# Patient Record
Sex: FEMALE | Race: BLACK OR AFRICAN AMERICAN | NOT HISPANIC OR LATINO
[De-identification: names, ages, dates, MRNs, and addresses within clinical notes are randomized per-mention and may not be internally consistent; named-entity substitution may affect disease eponyms.]

---

## 2016-12-23 RX ORDER — SODIUM CHLORIDE 9 MG/ML
1000 INJECTION, SOLUTION INTRAVENOUS
Qty: 0 | Refills: 0 | Status: DISCONTINUED | OUTPATIENT
Start: 2017-01-13 | End: 2017-01-14

## 2017-01-12 ENCOUNTER — RESULT REVIEW (OUTPATIENT)
Age: 47
End: 2017-01-12

## 2017-01-12 NOTE — ASU PATIENT PROFILE, ADULT - PMH
Abnormal uterine and vaginal bleeding, unspecified    Hypertension    Iron deficiency anemia    Lupus nephritis    Obesity    SLE (systemic lupus erythematosus)

## 2017-01-13 ENCOUNTER — OUTPATIENT (OUTPATIENT)
Dept: OUTPATIENT SERVICES | Facility: HOSPITAL | Age: 47
LOS: 1 days | Discharge: HOME | End: 2017-01-13
Payer: COMMERCIAL

## 2017-01-13 VITALS
HEART RATE: 71 BPM | TEMPERATURE: 99 F | SYSTOLIC BLOOD PRESSURE: 142 MMHG | HEIGHT: 68.5 IN | WEIGHT: 251.11 LBS | OXYGEN SATURATION: 98 % | RESPIRATION RATE: 14 BRPM | DIASTOLIC BLOOD PRESSURE: 95 MMHG

## 2017-01-13 VITALS
DIASTOLIC BLOOD PRESSURE: 92 MMHG | OXYGEN SATURATION: 97 % | TEMPERATURE: 97 F | SYSTOLIC BLOOD PRESSURE: 128 MMHG | HEART RATE: 82 BPM | RESPIRATION RATE: 16 BRPM

## 2017-01-13 DIAGNOSIS — D36.9 BENIGN NEOPLASM, UNSPECIFIED SITE: Chronic | ICD-10-CM

## 2017-01-13 DIAGNOSIS — N93.9 ABNORMAL UTERINE AND VAGINAL BLEEDING, UNSPECIFIED: ICD-10-CM

## 2017-01-13 PROCEDURE — 88305 TISSUE EXAM BY PATHOLOGIST: CPT | Mod: 26

## 2017-01-13 RX ORDER — SODIUM CHLORIDE 9 MG/ML
1000 INJECTION, SOLUTION INTRAVENOUS
Qty: 0 | Refills: 0 | Status: DISCONTINUED | OUTPATIENT
Start: 2017-01-13 | End: 2017-01-14

## 2017-01-13 NOTE — ASU DISCHARGE PLAN (ADULT/PEDIATRIC). - NOTIFY
Inability to Tolerate Liquids or Foods/Increased Irritability or Sluggishness/Swelling that continues/Numbness, tingling/Numbness, color, or temperature change to extremity/Persistent Nausea and Vomiting/Bleeding that does not stop/Unable to Urinate/Pain not relieved by Medications/Excessive Diarrhea/GYN Fever>100.4 Bleeding that does not stop/Unable to Urinate/Pain not relieved by Medications/Persistent Nausea and Vomiting/GYN Fever>100.4/Inability to Tolerate Liquids or Foods

## 2017-01-13 NOTE — BRIEF OPERATIVE NOTE - PROCEDURE
Intrauterine device insertion  01/13/2017    Active  [id number]  Hysteroscopy with dilation and curettage of uterus  01/13/2017    Active  [id number]

## 2017-01-13 NOTE — BRIEF OPERATIVE NOTE - POST-OP DX
Irregular menstrual bleeding  01/13/2017    Active  Kira Lopez Abnormal uterine bleeding (AUB)  01/15/2017    Active  Sarahy Espino  Irregular menstrual bleeding  01/13/2017    Active  Kira Lopez

## 2017-01-13 NOTE — ASU DISCHARGE PLAN (ADULT/PEDIATRIC). - NURSING INSTRUCTIONS
You were given 1000mg IV Tylenol for pain management.  Please DO NOT take any Tylenol containing products, such as  Vicodin, Percocet, Excedrin, many cold preparations for the next 8 hours (until 4p).  DO NOT EXCEED 3000MG OF TYLENOL OVER 24 HOURS.

## 2017-01-13 NOTE — BRIEF OPERATIVE NOTE - OPERATION/FINDINGS
axial uterus, normal endometrium with punctate lesions visualized axial uterus, normal endometrium with punctate lesions visualized ( these looked like small vessels on fundal area) Mild thickened posterior uterine wall

## 2017-01-13 NOTE — ASU DISCHARGE PLAN (ADULT/PEDIATRIC). - MEDICATION SUMMARY - MEDICATIONS TO TAKE
I will START or STAY ON the medications listed below when I get home from the hospital:    Motrin 600 mg oral tablet  -- 1 tab(s) by mouth every 6 hours  -- Indication: For pain    Tylenol 325 mg oral tablet  -- 2 tab(s) by mouth every 4 hours  -- Indication: For pain    valsartan 320 mg oral tablet  -- 1 tab(s) by mouth once a day (at bedtime)  -- Indication: For home med    Plaquenil 200 mg oral tablet  -- 2 tab(s) by mouth once a day (at bedtime) takes 2 tablets nightly  -- Indication: For home med    Bystolic 5 mg oral tablet  -- 1 tab(s) by mouth once a day (at bedtime)  -- Indication: For home me    amLODIPine 10 mg oral tablet  -- 1 tab(s) by mouth once a day (at bedtime)  -- Indication: For home med    CellCept 500 mg oral tablet  -- 4 tab(s) by mouth once a day takes all tabs at once at dinner  -- Indication: For home med    Vitamin D3 2000 intl units oral tablet  -- 1 tab(s) by mouth once a day  -- Indication: For home med

## 2017-01-13 NOTE — BRIEF OPERATIVE NOTE - PRE-OP DX
Irregular menstruation  01/13/2017    Active  Kira Lopez Abnormal uterine bleeding (AUB)  01/15/2017    Active  Sarahy Espino  Irregular menstruation  01/13/2017    Active  Kira Lopez

## 2017-01-13 NOTE — ASU DISCHARGE PLAN (ADULT/PEDIATRIC). - ACTIVITY LEVEL
nothing per rectum/no tampons/no tub baths/nothing per vagina/no douching/no intercourse no intercourse/no tub baths/no tampons/nothing per vagina/for two weeks/no douching/nothing per rectum

## 2017-01-17 LAB — SURGICAL PATHOLOGY STUDY: SIGNIFICANT CHANGE UP

## 2017-05-19 ENCOUNTER — OUTPATIENT (OUTPATIENT)
Dept: OUTPATIENT SERVICES | Facility: HOSPITAL | Age: 47
LOS: 1 days | End: 2017-05-19
Payer: COMMERCIAL

## 2017-05-19 ENCOUNTER — APPOINTMENT (OUTPATIENT)
Dept: CT IMAGING | Facility: CLINIC | Age: 47
End: 2017-05-19

## 2017-05-19 ENCOUNTER — APPOINTMENT (OUTPATIENT)
Dept: ULTRASOUND IMAGING | Facility: CLINIC | Age: 47
End: 2017-05-19

## 2017-05-19 DIAGNOSIS — R06.02 SHORTNESS OF BREATH: ICD-10-CM

## 2017-05-19 DIAGNOSIS — D36.9 BENIGN NEOPLASM, UNSPECIFIED SITE: Chronic | ICD-10-CM

## 2017-05-19 PROCEDURE — 93970 EXTREMITY STUDY: CPT

## 2017-05-19 PROCEDURE — 82565 ASSAY OF CREATININE: CPT

## 2017-05-25 ENCOUNTER — APPOINTMENT (OUTPATIENT)
Dept: CT IMAGING | Facility: CLINIC | Age: 47
End: 2017-05-25

## 2017-06-28 ENCOUNTER — APPOINTMENT (OUTPATIENT)
Dept: RHEUMATOLOGY | Facility: CLINIC | Age: 47
End: 2017-06-28

## 2017-06-28 VITALS — SYSTOLIC BLOOD PRESSURE: 150 MMHG | RESPIRATION RATE: 16 BRPM | DIASTOLIC BLOOD PRESSURE: 100 MMHG | HEART RATE: 88 BPM

## 2017-06-29 LAB
25(OH)D3 SERPL-MCNC: 22.9 NG/ML
ALBUMIN SERPL ELPH-MCNC: 3.6 G/DL
ALP BLD-CCNC: 110 U/L
ALT SERPL-CCNC: 18 U/L
ANION GAP SERPL CALC-SCNC: 12 MMOL/L
APPEARANCE: ABNORMAL
AST SERPL-CCNC: 18 U/L
BACTERIA: ABNORMAL
BASOPHILS # BLD AUTO: 0.02 K/UL
BASOPHILS NFR BLD AUTO: 0.2 %
BILIRUB SERPL-MCNC: 0.4 MG/DL
BILIRUBIN URINE: NEGATIVE
BLOOD URINE: ABNORMAL
BUN SERPL-MCNC: 18 MG/DL
C3 SERPL-MCNC: 35 MG/DL
C4 SERPL-MCNC: 2 MG/DL
CALCIUM SERPL-MCNC: 8.5 MG/DL
CHLORIDE SERPL-SCNC: 111 MMOL/L
CHOLEST SERPL-MCNC: 137 MG/DL
CHOLEST/HDLC SERPL: 4.4 RATIO
CK SERPL-CCNC: 89 U/L
CO2 SERPL-SCNC: 19 MMOL/L
COLOR: YELLOW
CREAT SERPL-MCNC: 1.16 MG/DL
CREAT SPEC-SCNC: 228 MG/DL
CREAT/PROT UR: 1.6 RATIO
DSDNA AB SER-ACNC: 388 IU/ML
ENA RNP AB SER IA-ACNC: 1.6 AL
ENA SM AB SER IA-ACNC: 6.8 AL
ENA SS-A AB SER IA-ACNC: 6.3 AL
ENA SS-B AB SER IA-ACNC: <0.2 AL
EOSINOPHIL # BLD AUTO: 0.11 K/UL
EOSINOPHIL NFR BLD AUTO: 1.2 %
GLUCOSE QUALITATIVE U: NORMAL MG/DL
GLUCOSE SERPL-MCNC: 99 MG/DL
HCT VFR BLD CALC: 32.9 %
HDLC SERPL-MCNC: 31 MG/DL
HGB BLD-MCNC: 11.5 G/DL
HYALINE CASTS: 4 /LPF
IMM GRANULOCYTES NFR BLD AUTO: 0.2 %
KETONES URINE: NEGATIVE
LDLC SERPL CALC-MCNC: 69 MG/DL
LEUKOCYTE ESTERASE URINE: ABNORMAL
LYMPHOCYTES # BLD AUTO: 1.84 K/UL
LYMPHOCYTES NFR BLD AUTO: 20 %
MAN DIFF?: NORMAL
MCHC RBC-ENTMCNC: 26.9 PG
MCHC RBC-ENTMCNC: 35 GM/DL
MCV RBC AUTO: 76.9 FL
MICROSCOPIC-UA: NORMAL
MONOCYTES # BLD AUTO: 0.63 K/UL
MONOCYTES NFR BLD AUTO: 6.9 %
NEUTROPHILS # BLD AUTO: 6.57 K/UL
NEUTROPHILS NFR BLD AUTO: 71.5 %
NITRITE URINE: NEGATIVE
PH URINE: 5.5
PLATELET # BLD AUTO: 136 K/UL
POTASSIUM SERPL-SCNC: 4.1 MMOL/L
PROT SERPL-MCNC: 8.8 G/DL
PROT UR-MCNC: 375 MG/DL
PROTEIN URINE: 300 MG/DL
RBC # BLD: 4.28 M/UL
RBC # FLD: 18.6 %
RED BLOOD CELLS URINE: 9 /HPF
SODIUM SERPL-SCNC: 142 MMOL/L
SPECIFIC GRAVITY URINE: 1.02
SQUAMOUS EPITHELIAL CELLS: 6 /HPF
TRIGL SERPL-MCNC: 186 MG/DL
TSH SERPL-ACNC: 1 UIU/ML
UROBILINOGEN URINE: 1 MG/DL
WBC # FLD AUTO: 9.19 K/UL
WHITE BLOOD CELLS URINE: 40 /HPF

## 2017-07-19 ENCOUNTER — APPOINTMENT (OUTPATIENT)
Dept: ULTRASOUND IMAGING | Facility: IMAGING CENTER | Age: 47
End: 2017-07-19

## 2017-07-19 ENCOUNTER — OUTPATIENT (OUTPATIENT)
Dept: OUTPATIENT SERVICES | Facility: HOSPITAL | Age: 47
LOS: 1 days | End: 2017-07-19
Payer: COMMERCIAL

## 2017-07-19 ENCOUNTER — APPOINTMENT (OUTPATIENT)
Dept: MAMMOGRAPHY | Facility: IMAGING CENTER | Age: 47
End: 2017-07-19

## 2017-07-19 DIAGNOSIS — D36.9 BENIGN NEOPLASM, UNSPECIFIED SITE: Chronic | ICD-10-CM

## 2017-07-19 DIAGNOSIS — Z00.00 ENCOUNTER FOR GENERAL ADULT MEDICAL EXAMINATION WITHOUT ABNORMAL FINDINGS: ICD-10-CM

## 2017-07-19 PROCEDURE — 76641 ULTRASOUND BREAST COMPLETE: CPT

## 2017-07-19 PROCEDURE — G0279: CPT

## 2017-07-19 PROCEDURE — 77066 DX MAMMO INCL CAD BI: CPT

## 2017-07-26 ENCOUNTER — APPOINTMENT (OUTPATIENT)
Dept: ULTRASOUND IMAGING | Facility: IMAGING CENTER | Age: 47
End: 2017-07-26

## 2017-07-26 ENCOUNTER — OUTPATIENT (OUTPATIENT)
Dept: OUTPATIENT SERVICES | Facility: HOSPITAL | Age: 47
LOS: 1 days | End: 2017-07-26

## 2017-07-26 ENCOUNTER — RESULT REVIEW (OUTPATIENT)
Age: 47
End: 2017-07-26

## 2017-07-26 ENCOUNTER — OUTPATIENT (OUTPATIENT)
Dept: OUTPATIENT SERVICES | Facility: HOSPITAL | Age: 47
LOS: 1 days | End: 2017-07-26
Payer: COMMERCIAL

## 2017-07-26 DIAGNOSIS — D36.9 BENIGN NEOPLASM, UNSPECIFIED SITE: Chronic | ICD-10-CM

## 2017-07-26 DIAGNOSIS — N63 UNSPECIFIED LUMP IN BREAST: ICD-10-CM

## 2017-07-26 PROCEDURE — 19083 BX BREAST 1ST LESION US IMAG: CPT

## 2017-07-26 PROCEDURE — 77065 DX MAMMO INCL CAD UNI: CPT

## 2017-07-26 PROCEDURE — A4648: CPT

## 2017-07-26 PROCEDURE — 88305 TISSUE EXAM BY PATHOLOGIST: CPT

## 2017-07-27 LAB — SURGICAL PATHOLOGY STUDY: SIGNIFICANT CHANGE UP

## 2017-08-17 ENCOUNTER — OUTPATIENT (OUTPATIENT)
Dept: OUTPATIENT SERVICES | Facility: HOSPITAL | Age: 47
LOS: 1 days | End: 2017-08-17
Payer: COMMERCIAL

## 2017-08-17 ENCOUNTER — APPOINTMENT (OUTPATIENT)
Dept: RADIOLOGY | Facility: IMAGING CENTER | Age: 47
End: 2017-08-17
Payer: COMMERCIAL

## 2017-08-17 DIAGNOSIS — D36.9 BENIGN NEOPLASM, UNSPECIFIED SITE: Chronic | ICD-10-CM

## 2017-08-17 DIAGNOSIS — D24.9 BENIGN NEOPLASM OF UNSPECIFIED BREAST: ICD-10-CM

## 2017-08-17 PROCEDURE — 71020: CPT | Mod: 26

## 2017-08-17 PROCEDURE — 71046 X-RAY EXAM CHEST 2 VIEWS: CPT

## 2017-09-25 ENCOUNTER — APPOINTMENT (OUTPATIENT)
Dept: ULTRASOUND IMAGING | Facility: HOSPITAL | Age: 47
End: 2017-09-25

## 2017-09-25 ENCOUNTER — OUTPATIENT (OUTPATIENT)
Dept: OUTPATIENT SERVICES | Facility: HOSPITAL | Age: 47
LOS: 1 days | End: 2017-09-25
Payer: COMMERCIAL

## 2017-09-25 ENCOUNTER — RESULT REVIEW (OUTPATIENT)
Age: 47
End: 2017-09-25

## 2017-09-25 DIAGNOSIS — D36.9 BENIGN NEOPLASM, UNSPECIFIED SITE: Chronic | ICD-10-CM

## 2017-09-25 DIAGNOSIS — M32.14 GLOMERULAR DISEASE IN SYSTEMIC LUPUS ERYTHEMATOSUS: ICD-10-CM

## 2017-09-25 PROCEDURE — 88348 ELECTRON MICROSCOPY DX: CPT | Mod: 26

## 2017-09-25 PROCEDURE — 88312 SPECIAL STAINS GROUP 1: CPT | Mod: 26

## 2017-09-25 PROCEDURE — 88312 SPECIAL STAINS GROUP 1: CPT

## 2017-09-25 PROCEDURE — 88313 SPECIAL STAINS GROUP 2: CPT | Mod: 26

## 2017-09-25 PROCEDURE — 50200 RENAL BIOPSY PERQ: CPT

## 2017-09-25 PROCEDURE — 88350 IMFLUOR EA ADDL 1ANTB STN PX: CPT | Mod: 26

## 2017-09-25 PROCEDURE — 88305 TISSUE EXAM BY PATHOLOGIST: CPT | Mod: 26

## 2017-09-25 PROCEDURE — 76942 ECHO GUIDE FOR BIOPSY: CPT | Mod: 26

## 2017-09-25 PROCEDURE — 50200 RENAL BIOPSY PERQ: CPT | Mod: LT

## 2017-09-25 PROCEDURE — 88348 ELECTRON MICROSCOPY DX: CPT

## 2017-09-25 PROCEDURE — 88350 IMFLUOR EA ADDL 1ANTB STN PX: CPT

## 2017-09-25 PROCEDURE — 88305 TISSUE EXAM BY PATHOLOGIST: CPT

## 2017-09-25 PROCEDURE — 88346 IMFLUOR 1ST 1ANTB STAIN PX: CPT

## 2017-09-25 PROCEDURE — 88313 SPECIAL STAINS GROUP 2: CPT

## 2017-09-25 PROCEDURE — 88346 IMFLUOR 1ST 1ANTB STAIN PX: CPT | Mod: 26

## 2017-09-25 PROCEDURE — 76942 ECHO GUIDE FOR BIOPSY: CPT

## 2017-09-26 ENCOUNTER — APPOINTMENT (OUTPATIENT)
Dept: RHEUMATOLOGY | Facility: CLINIC | Age: 47
End: 2017-09-26

## 2017-10-01 ENCOUNTER — FORM ENCOUNTER (OUTPATIENT)
Age: 47
End: 2017-10-01

## 2017-10-02 ENCOUNTER — APPOINTMENT (OUTPATIENT)
Dept: RHEUMATOLOGY | Facility: CLINIC | Age: 47
End: 2017-10-02
Payer: COMMERCIAL

## 2017-10-02 ENCOUNTER — LABORATORY RESULT (OUTPATIENT)
Age: 47
End: 2017-10-02

## 2017-10-02 VITALS
DIASTOLIC BLOOD PRESSURE: 94 MMHG | HEIGHT: 66 IN | TEMPERATURE: 97.6 F | SYSTOLIC BLOOD PRESSURE: 140 MMHG | OXYGEN SATURATION: 99 % | BODY MASS INDEX: 39.86 KG/M2 | HEART RATE: 81 BPM | WEIGHT: 248 LBS

## 2017-10-02 LAB
BASOPHILS # BLD AUTO: 0 K/UL
BASOPHILS NFR BLD AUTO: 0 %
EOSINOPHIL # BLD AUTO: 0 K/UL
EOSINOPHIL NFR BLD AUTO: 0 %
HCT VFR BLD CALC: 24.4 %
HGB BLD-MCNC: 8.2 G/DL
LYMPHOCYTES # BLD AUTO: 1.3 K/UL
LYMPHOCYTES NFR BLD AUTO: 7.9 %
MAN DIFF?: NORMAL
MCHC RBC-ENTMCNC: 24.9 PG
MCHC RBC-ENTMCNC: 33.6 GM/DL
MCV RBC AUTO: 74.2 FL
MONOCYTES # BLD AUTO: 0.3 K/UL
MONOCYTES NFR BLD AUTO: 1.8 %
NEUTROPHILS # BLD AUTO: 14.89 K/UL
NEUTROPHILS NFR BLD AUTO: 90.3 %
PLATELET # BLD AUTO: 224 K/UL
RBC # BLD: 3.29 M/UL
RBC # BLD: 3.35 M/UL
RBC # FLD: 17.6 %
RETICS # AUTO: 1.6 %
RETICS AGGREG/RBC NFR: 52.3 K/UL
WBC # FLD AUTO: 16.49 K/UL

## 2017-10-02 PROCEDURE — 99215 OFFICE O/P EST HI 40 MIN: CPT

## 2017-10-02 PROCEDURE — 71020: CPT

## 2017-10-03 LAB
ALBUMIN SERPL ELPH-MCNC: 3.4 G/DL
ALP BLD-CCNC: 84 U/L
ALT SERPL-CCNC: 11 U/L
ANION GAP SERPL CALC-SCNC: 17 MMOL/L
APPEARANCE: ABNORMAL
AST SERPL-CCNC: 14 U/L
BACTERIA: NEGATIVE
BILIRUB SERPL-MCNC: 0.4 MG/DL
BILIRUBIN URINE: NEGATIVE
BLOOD URINE: ABNORMAL
BUN SERPL-MCNC: 48 MG/DL
C3 SERPL-MCNC: 32 MG/DL
C4 SERPL-MCNC: 3 MG/DL
CALCIUM SERPL-MCNC: 9.2 MG/DL
CHLORIDE SERPL-SCNC: 107 MMOL/L
CK SERPL-CCNC: 55 U/L
CO2 SERPL-SCNC: 15 MMOL/L
COLOR: YELLOW
CREAT SERPL-MCNC: 3.55 MG/DL
CREAT SPEC-SCNC: 160 MG/DL
CREAT/PROT UR: 4.3 RATIO
DIRECT COOMBS: NORMAL
DSDNA AB SER-ACNC: 605 IU/ML
ENA RNP AB SER IA-ACNC: 2.1 AL
ENA SM AB SER IA-ACNC: >8 AL
ENA SS-A AB SER IA-ACNC: 6.6 AL
ENA SS-B AB SER IA-ACNC: <0.2 AL
FERRITIN SERPL-MCNC: 279 NG/ML
FOLATE SERPL-MCNC: 6.4 NG/ML
GLUCOSE QUALITATIVE U: NORMAL MG/DL
GLUCOSE SERPL-MCNC: 112 MG/DL
HAPTOGLOB SERPL-MCNC: 197 MG/DL
HYALINE CASTS: 6 /LPF
IRON SATN MFR SERPL: 41 %
IRON SERPL-MCNC: 84 UG/DL
KETONES URINE: NEGATIVE
LDH SERPL-CCNC: 213 U/L
LEUKOCYTE ESTERASE URINE: NEGATIVE
MICROSCOPIC-UA: NORMAL
MPO AB + PR3 PNL SER: NORMAL
NITRITE URINE: NEGATIVE
PH URINE: 5
POTASSIUM SERPL-SCNC: 4.5 MMOL/L
PROT SERPL-MCNC: 8.7 G/DL
PROT UR-MCNC: 685 MG/DL
PROTEIN URINE: >300 MG/DL
RED BLOOD CELLS URINE: 10 /HPF
SODIUM SERPL-SCNC: 139 MMOL/L
SPECIFIC GRAVITY URINE: 1.02
SQUAMOUS EPITHELIAL CELLS: 8 /HPF
TIBC SERPL-MCNC: 207 UG/DL
TSH SERPL-ACNC: 0.39 UIU/ML
UIBC SERPL-MCNC: 123 UG/DL
UROBILINOGEN URINE: NORMAL MG/DL
VIT B12 SERPL-MCNC: 364 PG/ML
WHITE BLOOD CELLS URINE: 13 /HPF

## 2017-10-05 ENCOUNTER — APPOINTMENT (OUTPATIENT)
Dept: RHEUMATOLOGY | Facility: CLINIC | Age: 47
End: 2017-10-05
Payer: COMMERCIAL

## 2017-10-05 LAB — SURGICAL PATHOLOGY STUDY: SIGNIFICANT CHANGE UP

## 2017-10-05 PROCEDURE — 90686 IIV4 VACC NO PRSV 0.5 ML IM: CPT

## 2017-10-05 PROCEDURE — 96365 THER/PROPH/DIAG IV INF INIT: CPT

## 2017-10-05 PROCEDURE — G0008: CPT

## 2017-10-06 ENCOUNTER — APPOINTMENT (OUTPATIENT)
Dept: RHEUMATOLOGY | Facility: CLINIC | Age: 47
End: 2017-10-06
Payer: COMMERCIAL

## 2017-10-06 ENCOUNTER — LABORATORY RESULT (OUTPATIENT)
Age: 47
End: 2017-10-06

## 2017-10-06 VITALS
HEIGHT: 66 IN | HEART RATE: 78 BPM | TEMPERATURE: 98.4 F | OXYGEN SATURATION: 100 % | DIASTOLIC BLOOD PRESSURE: 96 MMHG | SYSTOLIC BLOOD PRESSURE: 152 MMHG

## 2017-10-06 PROCEDURE — 96365 THER/PROPH/DIAG IV INF INIT: CPT

## 2017-10-06 PROCEDURE — 99214 OFFICE O/P EST MOD 30 MIN: CPT

## 2017-10-06 PROCEDURE — 36415 COLL VENOUS BLD VENIPUNCTURE: CPT

## 2017-10-06 PROCEDURE — 99214 OFFICE O/P EST MOD 30 MIN: CPT | Mod: 25

## 2017-10-07 LAB
ALBUMIN SERPL ELPH-MCNC: 3.1 G/DL
ALP BLD-CCNC: 89 U/L
ALT SERPL-CCNC: 13 U/L
ANION GAP SERPL CALC-SCNC: 19 MMOL/L
AST SERPL-CCNC: 14 U/L
BASOPHILS # BLD AUTO: 0.01 K/UL
BASOPHILS NFR BLD AUTO: 0 %
BILIRUB SERPL-MCNC: 0.4 MG/DL
BUN SERPL-MCNC: 71 MG/DL
CALCIUM SERPL-MCNC: 8.7 MG/DL
CHLORIDE SERPL-SCNC: 107 MMOL/L
CO2 SERPL-SCNC: 14 MMOL/L
CREAT SERPL-MCNC: 3.29 MG/DL
EOSINOPHIL # BLD AUTO: 0.01 K/UL
EOSINOPHIL NFR BLD AUTO: 0 %
GLUCOSE SERPL-MCNC: 101 MG/DL
HCT VFR BLD CALC: 23.3 %
HGB BLD-MCNC: 8 G/DL
IMM GRANULOCYTES NFR BLD AUTO: 0.7 %
LYMPHOCYTES # BLD AUTO: 2.21 K/UL
LYMPHOCYTES NFR BLD AUTO: 8.1 %
MAN DIFF?: NORMAL
MCHC RBC-ENTMCNC: 25.2 PG
MCHC RBC-ENTMCNC: 34.3 GM/DL
MCV RBC AUTO: 73.3 FL
MONOCYTES # BLD AUTO: 0.92 K/UL
MONOCYTES NFR BLD AUTO: 3.4 %
NEUTROPHILS # BLD AUTO: 23.78 K/UL
NEUTROPHILS NFR BLD AUTO: 87.8 %
PLATELET # BLD AUTO: 276 K/UL
POTASSIUM SERPL-SCNC: 4.7 MMOL/L
PROT SERPL-MCNC: 7.7 G/DL
RBC # BLD: 3.18 M/UL
RBC # FLD: 17.5 %
SODIUM SERPL-SCNC: 140 MMOL/L
WBC # FLD AUTO: 27.13 K/UL

## 2017-10-09 ENCOUNTER — LABORATORY RESULT (OUTPATIENT)
Age: 47
End: 2017-10-09

## 2017-10-09 ENCOUNTER — MEDICATION RENEWAL (OUTPATIENT)
Age: 47
End: 2017-10-09

## 2017-10-09 ENCOUNTER — APPOINTMENT (OUTPATIENT)
Dept: RHEUMATOLOGY | Facility: CLINIC | Age: 47
End: 2017-10-09
Payer: COMMERCIAL

## 2017-10-09 DIAGNOSIS — Z87.09 PERSONAL HISTORY OF OTHER DISEASES OF THE RESPIRATORY SYSTEM: ICD-10-CM

## 2017-10-09 PROCEDURE — 96365 THER/PROPH/DIAG IV INF INIT: CPT

## 2017-10-09 PROCEDURE — 36415 COLL VENOUS BLD VENIPUNCTURE: CPT

## 2017-10-12 ENCOUNTER — INPATIENT (INPATIENT)
Facility: HOSPITAL | Age: 47
LOS: 3 days | Discharge: HOME | DRG: 866 | End: 2017-10-16
Attending: INTERNAL MEDICINE | Admitting: INTERNAL MEDICINE
Payer: COMMERCIAL

## 2017-10-12 ENCOUNTER — APPOINTMENT (OUTPATIENT)
Dept: RHEUMATOLOGY | Facility: CLINIC | Age: 47
End: 2017-10-12

## 2017-10-12 VITALS
SYSTOLIC BLOOD PRESSURE: 142 MMHG | HEIGHT: 68 IN | WEIGHT: 248.02 LBS | DIASTOLIC BLOOD PRESSURE: 83 MMHG | RESPIRATION RATE: 16 BRPM | HEART RATE: 83 BPM | OXYGEN SATURATION: 100 % | TEMPERATURE: 99 F

## 2017-10-12 DIAGNOSIS — B02.8 ZOSTER WITH OTHER COMPLICATIONS: ICD-10-CM

## 2017-10-12 DIAGNOSIS — D36.9 BENIGN NEOPLASM, UNSPECIFIED SITE: Chronic | ICD-10-CM

## 2017-10-12 LAB
ALBUMIN SERPL ELPH-MCNC: 3.4 G/DL — SIGNIFICANT CHANGE UP (ref 3.3–5)
ALP SERPL-CCNC: 98 U/L — SIGNIFICANT CHANGE UP (ref 40–120)
ALT FLD-CCNC: 35 U/L RC — SIGNIFICANT CHANGE UP (ref 10–45)
ANION GAP SERPL CALC-SCNC: 15 MMOL/L — SIGNIFICANT CHANGE UP (ref 5–17)
ANISOCYTOSIS BLD QL: SLIGHT — SIGNIFICANT CHANGE UP
APPEARANCE UR: ABNORMAL
APTT BLD: 20.7 SEC — LOW (ref 27.5–37.4)
AST SERPL-CCNC: 22 U/L — SIGNIFICANT CHANGE UP (ref 10–40)
BACTERIA # UR AUTO: ABNORMAL /HPF
BASOPHILS # BLD AUTO: 0.1 K/UL — SIGNIFICANT CHANGE UP (ref 0–0.2)
BASOPHILS NFR BLD AUTO: 0 % — SIGNIFICANT CHANGE UP (ref 0–2)
BILIRUB SERPL-MCNC: 0.3 MG/DL — SIGNIFICANT CHANGE UP (ref 0.2–1.2)
BILIRUB UR-MCNC: NEGATIVE — SIGNIFICANT CHANGE UP
BUN SERPL-MCNC: 83 MG/DL — HIGH (ref 7–23)
CALCIUM SERPL-MCNC: 8.7 MG/DL — SIGNIFICANT CHANGE UP (ref 8.4–10.5)
CHLORIDE SERPL-SCNC: 111 MMOL/L — HIGH (ref 96–108)
CO2 SERPL-SCNC: 14 MMOL/L — LOW (ref 22–31)
COLOR SPEC: YELLOW — SIGNIFICANT CHANGE UP
CREAT SERPL-MCNC: 2.99 MG/DL — HIGH (ref 0.5–1.3)
CRP SERPL-MCNC: 0.2 MG/DL — SIGNIFICANT CHANGE UP (ref 0–0.4)
DACRYOCYTES BLD QL SMEAR: SLIGHT — SIGNIFICANT CHANGE UP
DIFF PNL FLD: ABNORMAL
EOSINOPHIL # BLD AUTO: 0.3 K/UL — SIGNIFICANT CHANGE UP (ref 0–0.5)
EOSINOPHIL NFR BLD AUTO: 2 % — SIGNIFICANT CHANGE UP (ref 0–6)
EPI CELLS # UR: SIGNIFICANT CHANGE UP /HPF
GLUCOSE SERPL-MCNC: 80 MG/DL — SIGNIFICANT CHANGE UP (ref 70–99)
GLUCOSE UR QL: NEGATIVE — SIGNIFICANT CHANGE UP
HCT VFR BLD CALC: 23.6 % — LOW (ref 34.5–45)
HGB BLD-MCNC: 8.4 G/DL — LOW (ref 11.5–15.5)
HYPOCHROMIA BLD QL: SLIGHT — SIGNIFICANT CHANGE UP
INR BLD: 0.94 RATIO — SIGNIFICANT CHANGE UP (ref 0.88–1.16)
KETONES UR-MCNC: NEGATIVE — SIGNIFICANT CHANGE UP
LEUKOCYTE ESTERASE UR-ACNC: ABNORMAL
LYMPHOCYTES # BLD AUTO: 20 % — SIGNIFICANT CHANGE UP (ref 13–44)
LYMPHOCYTES # BLD AUTO: 6 K/UL — HIGH (ref 1–3.3)
MCHC RBC-ENTMCNC: 27.3 PG — SIGNIFICANT CHANGE UP (ref 27–34)
MCHC RBC-ENTMCNC: 35.3 GM/DL — SIGNIFICANT CHANGE UP (ref 32–36)
MCV RBC AUTO: 77.1 FL — LOW (ref 80–100)
MICROCYTES BLD QL: SLIGHT — SIGNIFICANT CHANGE UP
MONOCYTES # BLD AUTO: 1.3 K/UL — HIGH (ref 0–0.9)
MONOCYTES NFR BLD AUTO: 8 % — SIGNIFICANT CHANGE UP (ref 2–14)
NEUTROPHILS # BLD AUTO: 19.5 K/UL — HIGH (ref 1.8–7.4)
NEUTROPHILS NFR BLD AUTO: 70 % — SIGNIFICANT CHANGE UP (ref 43–77)
NITRITE UR-MCNC: NEGATIVE — SIGNIFICANT CHANGE UP
OVALOCYTES BLD QL SMEAR: SLIGHT — SIGNIFICANT CHANGE UP
PH UR: 6 — SIGNIFICANT CHANGE UP (ref 5–8)
PLAT MORPH BLD: NORMAL — SIGNIFICANT CHANGE UP
PLATELET # BLD AUTO: 191 K/UL — SIGNIFICANT CHANGE UP (ref 150–400)
POIKILOCYTOSIS BLD QL AUTO: SLIGHT — SIGNIFICANT CHANGE UP
POLYCHROMASIA BLD QL SMEAR: SLIGHT — SIGNIFICANT CHANGE UP
POTASSIUM SERPL-MCNC: 4.7 MMOL/L — SIGNIFICANT CHANGE UP (ref 3.5–5.3)
POTASSIUM SERPL-SCNC: 4.7 MMOL/L — SIGNIFICANT CHANGE UP (ref 3.5–5.3)
PROT SERPL-MCNC: 6.6 G/DL — SIGNIFICANT CHANGE UP (ref 6–8.3)
PROT UR-MCNC: 300 MG/DL
PROTHROM AB SERPL-ACNC: 10.2 SEC — SIGNIFICANT CHANGE UP (ref 9.8–12.7)
RBC # BLD: 3.07 M/UL — LOW (ref 3.8–5.2)
RBC # FLD: 17.6 % — HIGH (ref 10.3–14.5)
RBC BLD AUTO: ABNORMAL
RBC CASTS # UR COMP ASSIST: >50 /HPF (ref 0–2)
SCHISTOCYTES BLD QL AUTO: SLIGHT — SIGNIFICANT CHANGE UP
SODIUM SERPL-SCNC: 140 MMOL/L — SIGNIFICANT CHANGE UP (ref 135–145)
SP GR SPEC: 1.01 — SIGNIFICANT CHANGE UP (ref 1.01–1.02)
SPHEROCYTES BLD QL SMEAR: SLIGHT — SIGNIFICANT CHANGE UP
STOMATOCYTES BLD QL SMEAR: SLIGHT — SIGNIFICANT CHANGE UP
TARGETS BLD QL SMEAR: SIGNIFICANT CHANGE UP
UROBILINOGEN FLD QL: NEGATIVE — SIGNIFICANT CHANGE UP
WBC # BLD: 30.5 K/UL — HIGH (ref 3.8–10.5)
WBC # FLD AUTO: 30.5 K/UL — HIGH (ref 3.8–10.5)
WBC UR QL: >50 /HPF (ref 0–5)

## 2017-10-12 PROCEDURE — 99285 EMERGENCY DEPT VISIT HI MDM: CPT

## 2017-10-12 PROCEDURE — 99222 1ST HOSP IP/OBS MODERATE 55: CPT

## 2017-10-12 RX ORDER — VALSARTAN 80 MG/1
320 TABLET ORAL DAILY
Qty: 0 | Refills: 0 | Status: DISCONTINUED | OUTPATIENT
Start: 2017-10-12 | End: 2017-10-16

## 2017-10-12 RX ORDER — ACYCLOVIR SODIUM 500 MG
1000 VIAL (EA) INTRAVENOUS ONCE
Qty: 0 | Refills: 0 | Status: DISCONTINUED | OUTPATIENT
Start: 2017-10-12 | End: 2017-10-12

## 2017-10-12 RX ORDER — OXYCODONE HYDROCHLORIDE 5 MG/1
5 TABLET ORAL EVERY 6 HOURS
Qty: 0 | Refills: 0 | Status: DISCONTINUED | OUTPATIENT
Start: 2017-10-12 | End: 2017-10-16

## 2017-10-12 RX ORDER — HYDROXYCHLOROQUINE SULFATE 200 MG
200 TABLET ORAL AT BEDTIME
Qty: 0 | Refills: 0 | Status: DISCONTINUED | OUTPATIENT
Start: 2017-10-12 | End: 2017-10-16

## 2017-10-12 RX ORDER — ACETAMINOPHEN 500 MG
1000 TABLET ORAL ONCE
Qty: 0 | Refills: 0 | Status: COMPLETED | OUTPATIENT
Start: 2017-10-12 | End: 2017-10-12

## 2017-10-12 RX ORDER — ACYCLOVIR SODIUM 500 MG
500 VIAL (EA) INTRAVENOUS EVERY 12 HOURS
Qty: 0 | Refills: 0 | Status: DISCONTINUED | OUTPATIENT
Start: 2017-10-12 | End: 2017-10-16

## 2017-10-12 RX ORDER — MYCOPHENOLATE MOFETIL 250 MG/1
2000 CAPSULE ORAL DAILY
Qty: 0 | Refills: 0 | Status: DISCONTINUED | OUTPATIENT
Start: 2017-10-12 | End: 2017-10-12

## 2017-10-12 RX ORDER — AMLODIPINE BESYLATE 2.5 MG/1
10 TABLET ORAL AT BEDTIME
Qty: 0 | Refills: 0 | Status: DISCONTINUED | OUTPATIENT
Start: 2017-10-12 | End: 2017-10-16

## 2017-10-12 RX ORDER — HEPARIN SODIUM 5000 [USP'U]/ML
5000 INJECTION INTRAVENOUS; SUBCUTANEOUS EVERY 8 HOURS
Qty: 0 | Refills: 0 | Status: DISCONTINUED | OUTPATIENT
Start: 2017-10-12 | End: 2017-10-16

## 2017-10-12 RX ORDER — PANTOPRAZOLE SODIUM 20 MG/1
40 TABLET, DELAYED RELEASE ORAL ONCE
Qty: 0 | Refills: 0 | Status: COMPLETED | OUTPATIENT
Start: 2017-10-12 | End: 2017-10-12

## 2017-10-12 RX ORDER — INFLUENZA VIRUS VACCINE 15; 15; 15; 15 UG/.5ML; UG/.5ML; UG/.5ML; UG/.5ML
0.5 SUSPENSION INTRAMUSCULAR ONCE
Qty: 0 | Refills: 0 | Status: COMPLETED | OUTPATIENT
Start: 2017-10-12 | End: 2017-10-16

## 2017-10-12 RX ORDER — METOPROLOL TARTRATE 50 MG
25 TABLET ORAL
Qty: 0 | Refills: 0 | Status: DISCONTINUED | OUTPATIENT
Start: 2017-10-12 | End: 2017-10-16

## 2017-10-12 RX ADMIN — Medication 400 MILLIGRAM(S): at 12:46

## 2017-10-12 RX ADMIN — Medication 200 MILLIGRAM(S): at 23:54

## 2017-10-12 RX ADMIN — OXYCODONE HYDROCHLORIDE 5 MILLIGRAM(S): 5 TABLET ORAL at 18:40

## 2017-10-12 RX ADMIN — Medication 1000 MILLIGRAM(S): at 13:54

## 2017-10-12 RX ADMIN — Medication 40 MILLIGRAM(S): at 17:42

## 2017-10-12 RX ADMIN — HEPARIN SODIUM 5000 UNIT(S): 5000 INJECTION INTRAVENOUS; SUBCUTANEOUS at 21:55

## 2017-10-12 RX ADMIN — PANTOPRAZOLE SODIUM 40 MILLIGRAM(S): 20 TABLET, DELAYED RELEASE ORAL at 18:14

## 2017-10-12 RX ADMIN — Medication 110 MILLIGRAM(S): at 14:00

## 2017-10-12 RX ADMIN — VALSARTAN 320 MILLIGRAM(S): 80 TABLET ORAL at 18:15

## 2017-10-12 RX ADMIN — OXYCODONE HYDROCHLORIDE 5 MILLIGRAM(S): 5 TABLET ORAL at 18:15

## 2017-10-12 RX ADMIN — AMLODIPINE BESYLATE 10 MILLIGRAM(S): 2.5 TABLET ORAL at 21:55

## 2017-10-12 NOTE — ED PROVIDER NOTE - OBJECTIVE STATEMENT
48 yo F with pmhx lupus, nephritis and htn presenting with rash x 3 days. Patient states she has been getting prednisone infusion for nephritis this past thursday, friday and monday. patient states the following day she began with a painful pustular rash to the right abdomen and left buttock area. Patient seenby her PCP Kulwant Greco for admission(Dr Conti). Patient admits to pruritis to the area. Patient denies cp, sob, fever, cough, abd pain, nvd, and weakness    lmp current

## 2017-10-12 NOTE — ED ADULT TRIAGE NOTE - CHIEF COMPLAINT QUOTE
right lateral abd and left buttock rash  denies fever, chills, dizziness, weakness, chest pain, sob  hx of lupus  Pt states she is on prednisone infusions

## 2017-10-12 NOTE — CONSULT NOTE ADULT - SUBJECTIVE AND OBJECTIVE BOX
Patient is a 47y old  Female who presents with a chief complaint of   HPI:  rash for short period of time  no fever chills pain   recent biopsy  on immunosuppressives     PAST MEDICAL & SURGICAL HISTORY:  Abnormal uterine and vaginal bleeding, unspecified  Iron deficiency anemia  Lupus nephritis  SLE (systemic lupus erythematosus)  Obesity  Hypertension  Benign tumor: h/o right thumb      Social history:    FAMILY HISTORY:  Family history of breast cancer (Sibling)  Family history of heart disease    REVIEW OF SYSTEMS  General:	Denies any malaise fatigue or chills. Fevers absent    Skin: rash  	  Ophthalmologic:Denies any visual complaints,discharge redness or photophobia  	  ENMT:No nasal discharge,headache,sinus congestion or throat pain.No dental complaints    Respiratory and Thorax:No cough,sputum or chest pain.Denies shortness of breath  	  Cardiovascular:	No chest pain,palpitaions or dizziness    Gastrointestinal:	NO nausea,abdominal pain or diarrhea.    Genitourinary:	No dysuria,frequency. No flank pain    Musculoskeletal:	No joint swelling or pain.No weakness    Neurological:No confusion,diziness.No extremity weakness.No bladder or bowel incontinence	      Hematology/Lymphatics:	No LN swelling.No gum bleeding     Endocrine:	No recent weight gain or loss.No abnormal heat/cold intolerance    Allergic/Immunologic:	No hives or rash   Allergies    Imuran (Rash)    Intolerances        Antimicrobials:    acyclovir IVPB 1000 milliGRAM(s) IV Intermittent once        Vital Signs Last 24 Hrs  T(C): 37.1 (12 Oct 2017 11:18), Max: 37.1 (12 Oct 2017 11:18)  T(F): 98.8 (12 Oct 2017 11:18), Max: 98.8 (12 Oct 2017 11:18)  HR: 75 (12 Oct 2017 12:20) (75 - 83)  BP: 150/106 (12 Oct 2017 12:20) (142/83 - 150/106)  BP(mean): --  RR: 18 (12 Oct 2017 12:20) (16 - 18)  SpO2: 100% (12 Oct 2017 12:20) (100% - 100%)    PHYSICAL EXAM:Pleasant patient in no acute distress.      Constitutional:Comfortable.Awake and alert  No cachexia     Eyes:PERRL EOMI.NO discharge or conjunctival injection    ENMT:No sinus tenderness.No thrush.No pharyngeal exudate or erythema.Fair dental hygiene    Neck:Supple,No LN,no JVD      Respiratory:Good air entry bilaterally,CTA    Gastrointestinal:Soft BS(+) no tenderness no masses ,No rebound or guarding    Genitourinary:No CVA tendereness     Rectal:    Extremities:No cyanosis,clubbing or edema.    Vascular:peripheral pulses felt    Neurological:AAO X 3,No grossly focal deficits    classic zoster rash on right thigh, lt buttock and right buttock      Lymph Nodes:No palpable LNs    Musculoskeletal:No joint swelling or LOM    Psychiatric:Affect normal.                                8.4    x     )-----------( 191      ( 12 Oct 2017 12:40 )             23.6         10-12    140  |  111<H>  |  83<H>  ----------------------------<  80  4.7   |  14<L>  |  2.99<H>    Ca    8.7      12 Oct 2017 12:40    TPro  6.6  /  Alb  3.4  /  TBili  0.3  /  DBili  x   /  AST  22  /  ALT  35  /  AlkPhos  98  10-12      RECENT CULTURES:      MICROBIOLOGY:          Radiology:      Assessment:        Recommendations and Plan:    Pager [phone]  After 5 pm/weekends or if no response :[phone]

## 2017-10-12 NOTE — H&P ADULT - NSHPREVIEWOFSYSTEMS_GEN_ALL_CORE
CONSTITUTIONAL: No fever, weight loss, or fatigue  EYES: No eye pain, visual disturbances, or discharge  NECK: No pain or stiffness  RESPIRATORY: No cough or wheezing, no shortness of breath  CARDIOVASCULAR: No chest pain, palpitations, dizziness, or leg swelling  GASTROINTESTINAL: No abdominal or epigastric pain. No nausea, vomiting, diarrhea or constipation  GENITOURINARY: No dysuria, urinary frequency or urgency, no hematuria  NEUROLOGICAL: No headaches, memory loss, loss of strength, numbness, or tremors  SKIN: No itching, burning, rashes, or lesions   MUSCULOSKELETAL: No joint pain or swelling; No muscle, back, or extremity pain

## 2017-10-12 NOTE — H&P ADULT - NSHPLABSRESULTS_GEN_ALL_CORE
LABS:	 	    CARDIAC MARKERS:                                8.4    30.5  )-----------( 191      ( 12 Oct 2017 12:40 )             23.6     10-12    140  |  111<H>  |  83<H>  ----------------------------<  80  4.7   |  14<L>  |  2.99<H>    Ca    8.7      12 Oct 2017 12:40    TPro  6.6  /  Alb  3.4  /  TBili  0.3  /  DBili  x   /  AST  22  /  ALT  35  /  AlkPhos  98  10-12    proBNP:   Lipid Profile:   HgA1c:   TSH:

## 2017-10-12 NOTE — ED PROVIDER NOTE - CARE PLAN
Principal Discharge DX:	Herpes zoster with other complication Principal Discharge DX:	Herpes zoster with other complication  Secondary Diagnosis:	Lupus nephritis  Secondary Diagnosis:	Systemic lupus erythematosus with other organ involvement

## 2017-10-12 NOTE — H&P ADULT - HISTORY OF PRESENT ILLNESS
48 yo W, with SLE, Lupus nephritis, treated with CellCept and pulse IV steroids as outpt recently for worsening renal function, presents to ED for evaluation of rash that appeared on her right side and buttock. The rash started several days ago, is painful, erythematous and vesicular in nature. Pt denies any fever, chills or sick contacts. No other complaints except painful rash.

## 2017-10-12 NOTE — CONSULT NOTE ADULT - SUBJECTIVE AND OBJECTIVE BOX
LEATHA [place]  [id number]    HISTORY OF PRESENT ILLNESS:      Past history:  46 yo W, with SLE ( arthritis, nasal ulcers, Raynauds, alopecia, rash, and nephritis), Lupus nephritis ( Biopsy in October 2011 demonstrated a class III (S)-A lesion. She has been treated with CellCept and remained on 2500 mg daily. She has had low-grade proteinuria. In the spring 2017 she stopped the CellCept on her own and then developed a major flare with an increase in creatinine to 2.5 with a urinary Pr/Cr of 5. She was put back on CellCept and prednisone 40 mg/d. The biopsy from late September 2017 demonstrated a class IV-G (A and C) lesion with 44% of the glomeruli sclerosed. In addition, she had tubular atrophy and interstitial fibrosis estimated at about 50%. It was decided despite the chronicity to try pulse steroids ( started 10/5).          Meds  furosemide 40 mg/d  prednisone 40 mg/d   CellCept 2500 mg/d in divided doses  amlodipine 10 mg  valsartan 320 mg  Bystolic 10 mg/d  Plaquenil 400 mg/d  Pepcid 40 mg/d        PAST MEDICAL & SURGICAL HISTORY:  Abnormal uterine and vaginal bleeding, unspecified  Iron deficiency anemia  Lupus nephritis  SLE (systemic lupus erythematosus)  Obesity  Hypertension  Benign tumor: h/o right thumb      Review of Systems:      MEDICATIONS  (STANDING):  acetaminophen  IVPB. 1000 milliGRAM(s) IV Intermittent once    MEDICATIONS  (PRN):      Allergies    Imuran (Rash)    Intolerances        PERTINENT MEDICATION HISTORY:    SOCIAL HISTORY:  OCCUPATION:  TRAVEL HISTORY:    FAMILY HISTORY:  Family history of breast cancer (Sibling)  Family history of heart disease (Father)      Vital Signs Last 24 Hrs  T(C): 37.1 (12 Oct 2017 11:18), Max: 37.1 (12 Oct 2017 11:18)  T(F): 98.8 (12 Oct 2017 11:18), Max: 98.8 (12 Oct 2017 11:18)  HR: 75 (12 Oct 2017 12:20) (75 - 83)  BP: 150/106 (12 Oct 2017 12:20) (142/83 - 150/106)  BP(mean): --  RR: 18 (12 Oct 2017 12:20) (16 - 18)  SpO2: 100% (12 Oct 2017 12:20) (100% - 100%)    Daily Height in cm: 172.72 (12 Oct 2017 11:18)    Daily     Physical Exam:  General: No apparent distress  HEENT: EOMI, MMM  CVS: +S1/S2, RRR, no murmurs/rubs/gallops  Resp: CTA b/l. No crackles/wheezing  GI: Soft, NT/ND +BS  MSK:  Shoulders: wnl  Elbows: wnl  Wrists: wnl  MCPs: wnl  PIPs: wnl  DIPs: wnl   Hips: wnl  Knees: wnl   Ankle: wnl  Neuro: AAOx3  Skin: no visible rashes    LABS:                RADIOLOGY & ADDITIONAL STUDIES: LEATHA [place]  [id number]    HISTORY OF PRESENT ILLNESS:      Past history:  46 yo W, with SLE ( arthritis, nasal ulcers, Raynauds, alopecia, rash, and nephritis), Lupus nephritis ( Biopsy in October 2011 demonstrated a class III (S)-A lesion. She has been treated with CellCept and remained on 2500 mg daily. She has had low-grade proteinuria. In the spring 2017 she stopped the CellCept on her own and then developed a major flare with an increase in creatinine to 2.5 with a urinary Pr/Cr of 5. She was put back on CellCept and prednisone 40 mg/d. The biopsy from late September 2017 demonstrated a class IV-G (A and C) lesion with 44% of the glomeruli sclerosed. In addition, she had tubular atrophy and interstitial fibrosis estimated at about 50%. It was decided despite the chronicity to try pulse steroids ( started 10/5).    Patient presented for evaluation of a painful vesicular rash that started few days ago initially over the right flank then spread to the left side as well as inner buttocks  She denies any fever or chills  no joint pain or swelling, no other complaints   no headaches       Meds  furosemide 40 mg/d  prednisone 40 mg/d   CellCept 2500 mg/d in divided doses  amlodipine 10 mg  valsartan 320 mg  Bystolic 10 mg/d  Plaquenil 400 mg/d  Pepcid 40 mg/d        PAST MEDICAL & SURGICAL HISTORY:  Abnormal uterine and vaginal bleeding, unspecified  Iron deficiency anemia  Lupus nephritis  SLE (systemic lupus erythematosus)  Obesity  Hypertension  Benign tumor: h/o right thumb      Review of Systems:      MEDICATIONS  (STANDING):  acetaminophen  IVPB. 1000 milliGRAM(s) IV Intermittent once    MEDICATIONS  (PRN):      Allergies    Imuran (Rash)    Intolerances          FAMILY HISTORY:  Family history of breast cancer (Sibling)  Family history of heart disease (Father)      Vital Signs Last 24 Hrs  T(C): 37.1 (12 Oct 2017 11:18), Max: 37.1 (12 Oct 2017 11:18)  T(F): 98.8 (12 Oct 2017 11:18), Max: 98.8 (12 Oct 2017 11:18)  HR: 75 (12 Oct 2017 12:20) (75 - 83)  BP: 150/106 (12 Oct 2017 12:20) (142/83 - 150/106)  BP(mean): --  RR: 18 (12 Oct 2017 12:20) (16 - 18)  SpO2: 100% (12 Oct 2017 12:20) (100% - 100%)    Daily Height in cm: 172.72 (12 Oct 2017 11:18)    Daily     Physical Exam:  General: No apparent distress  HEENT: EOMI, MMM  CVS: +S1/S2, RRR,   Resp: CTA b/l. No crackles/wheezing  GI: Soft, NT/ND +BS  MSK:  no tender or swollen joints  Bilateral LE edema  right flank and inner buttocks : vesicular rash, open vesicles       LABS:                RADIOLOGY & ADDITIONAL STUDIES: LEATHA [place]  [id number]    HISTORY OF PRESENT ILLNESS:      Past history:  48 yo W, with SLE ( arthritis, nasal ulcers, Raynauds, alopecia, rash, and nephritis), Lupus nephritis ( Biopsy in October 2011 demonstrated a class III (S)-A lesion. She has been treated with CellCept and remained on 2500 mg daily. She has had low-grade proteinuria. In the spring 2017 she stopped the CellCept on her own and then developed a major flare with an increase in creatinine to 2.5 with a urinary Pr/Cr of 5. She was put back on CellCept and prednisone 40 mg/d. The biopsy from late September 2017 demonstrated a class IV-G (A and C) lesion with 44% of the glomeruli sclerosed. In addition, she had tubular atrophy and interstitial fibrosis estimated at about 50%. It was decided despite the chronicity to try pulse steroids ( started 10/5).    Patient presented for evaluation of a painful vesicular rash that started few days ago initially over the right flank then spread to the left side as well as inner buttocks  She denies any fever or chills  no joint pain or swelling, no other complaints   no headaches       Meds  furosemide 40 mg/d  prednisone 40 mg/d   CellCept 2500 mg/d in divided doses  amlodipine 10 mg  valsartan 320 mg  Bystolic 10 mg/d  Plaquenil 400 mg/d  Pepcid 40 mg/d        PAST MEDICAL & SURGICAL HISTORY:  Abnormal uterine and vaginal bleeding, unspecified  Iron deficiency anemia  Lupus nephritis  SLE (systemic lupus erythematosus)  Obesity  Hypertension  Benign tumor: h/o right thumb      Review of Systems:      MEDICATIONS  (STANDING):  acetaminophen  IVPB. 1000 milliGRAM(s) IV Intermittent once    MEDICATIONS  (PRN):      Allergies    Imuran (Rash)    Intolerances          FAMILY HISTORY:  Family history of breast cancer (Sibling)  Family history of heart disease (Father)      Vital Signs Last 24 Hrs  T(C): 37.1 (12 Oct 2017 11:18), Max: 37.1 (12 Oct 2017 11:18)  T(F): 98.8 (12 Oct 2017 11:18), Max: 98.8 (12 Oct 2017 11:18)  HR: 75 (12 Oct 2017 12:20) (75 - 83)  BP: 150/106 (12 Oct 2017 12:20) (142/83 - 150/106)  BP(mean): --  RR: 18 (12 Oct 2017 12:20) (16 - 18)  SpO2: 100% (12 Oct 2017 12:20) (100% - 100%)    Daily Height in cm: 172.72 (12 Oct 2017 11:18)    Daily     Physical Exam:  General: No apparent distress  HEENT: EOMI, MMM  CVS: +S1/S2, RRR,   Resp: CTA b/l. No crackles/wheezing  GI: Soft, NT/ND +BS  MSK:  no tender or swollen joints  Bilateral LE edema  right flank and inner buttocks : vesicular rash, open vesicles       LABS:      10-12    140  |  111<H>  |  83<H>  ----------------------------<  80  4.7   |  14<L>  |  2.99<H>    Ca    8.7      12 Oct 2017 12:40                              8.4    30.5  )-----------( 191      ( 12 Oct 2017 12:40 )             23.6             RADIOLOGY & ADDITIONAL STUDIES:

## 2017-10-12 NOTE — ED PROVIDER NOTE - MEDICAL DECISION MAKING DETAILS
46 yo F with pmhx of lupus, nephritis and htn 46 yo F with pmhx of lupus, nephritis and htn presenting with herpes zoster several dermatomes recently finished iv steroids. will need admission for iv acyclovir. ID consult and rheum consult. ZR

## 2017-10-12 NOTE — ED ADULT NURSE NOTE - CHPI ED SYMPTOMS NEG
no fever/no decreased eating/drinking/no bruising/no vomiting/no scaly patches on skin/no chills/no petechia

## 2017-10-12 NOTE — H&P ADULT - ASSESSMENT
46 yo F w/SLE, lupus nephritis, Herpes Zoster:  1. Zoster - IV Acyclovir per ID, Neurontin for pain if ok w/Renal, isolation until lesions crusted  2. SLE, lupus nephritis - c/w 40mg Prednisone daily, Cellcept held per Rheum  3. DVT prophylaxis

## 2017-10-12 NOTE — CONSULT NOTE ADULT - ASSESSMENT
pt is immunosuppressed and has zoster involving more than two  dermatomes.  would admit  and treat with IV acyclovir 5mg/kg q 12 hr  ( creat cl about 20-30 )  airborne insolation

## 2017-10-12 NOTE — ED PROVIDER NOTE - NOTES
admit patient to him case discussed with the fellow. Agrees with giving acyclovir 10mg/kg q 12 hours. will see patient on floor

## 2017-10-12 NOTE — ED ADULT NURSE REASSESSMENT NOTE - NS ED NURSE REASSESS COMMENT FT1
Received report from Yuki MARTIN RN. Safety checked. Pt states partial relief of pain. Comfort care provided. Pt encouraged to call for assist when needed. pending bed assignment.

## 2017-10-12 NOTE — CONSULT NOTE ADULT - ASSESSMENT
48 yo W, with SLE ( arthritis, nasal ulcers, Raynauds, alopecia, rash, and nephritis), Lupus nephritis ( s/p pulse steroids on 10/5-10/9), admitted with vesicular rash    *  Zoster > 2 dermatomes in an immunocompromised patient   IV acyclovir and airborne isolation   ID following    * SLE and Lupus nephritis ( s/p pulse steroids on 10/5-10/9),  - would hold CellCept for 3days until vesicles crusted  -continue prednisone 40 mg daily    Patient will follow up closely with Dr Guerra upon discharge regarding immunosuppressive regimen    Jana Yoon MD   Rheumatology Fellow  Pager: [phone] 48 yo W, with SLE ( arthritis, nasal ulcers, Raynauds, alopecia, rash, and nephritis), Lupus nephritis ( s/p pulse steroids on 10/5-10/9), admitted with vesicular rash    *  Zoster > 2 dermatomes in an immunocompromised patient   IV acyclovir and airborne isolation   ID following    * SLE and Lupus nephritis ( s/p pulse steroids on 10/5-10/9),  - would hold CellCept  until vesicles crusted  -continue prednisone 40 mg daily    Patient will follow up closely with Dr Guerra upon discharge regarding immunosuppressive regimen    Jana Yoon MD   Rheumatology Fellow  Pager: [phone]

## 2017-10-12 NOTE — H&P ADULT - NSHPPHYSICALEXAM_GEN_ALL_CORE
T(C): 36.7 (10-12-17 @ 15:56), Max: 37.1 (10-12-17 @ 11:18)  HR: 71 (10-12-17 @ 15:56) (71 - 83)  BP: 153/82 (10-12-17 @ 15:56) (134/81 - 153/82)  RR: 18 (10-12-17 @ 15:56) (16 - 18)  SpO2: 100% (10-12-17 @ 15:56) (100% - 100%)  Wt(kg): --  I&O's Summary      Appearance: Normal	  HEENT:   NCAT, PERRL, EOMI	  Lymphatic: No lymphadenopathy  Cardiovascular: Normal S1 S2, RRR  Respiratory: Lungs clear to auscultation BL  Psychiatry: A & O x 3, Mood & affect appropriate  Gastrointestinal:  Soft, Non-tender, + BS  Skin: No rashes, No ecchymoses, No cyanosis	  Neurologic: Non-focal  Extremities: Normal range of motion, No clubbing, cyanosis or edema

## 2017-10-12 NOTE — ED ADULT NURSE NOTE - OBJECTIVE STATEMENT
Female 47 years old with history of Lupus came in for pustular rash at right mid abdomen and inner buttocks, onset Tuesday, associated with sharp pain 5/10. Denies fever, chills, nausea and vomiting. Denies urinary symptoms. Labs obtained. Will monitor. Family at bedside.

## 2017-10-13 LAB
ANION GAP SERPL CALC-SCNC: 12 MMOL/L — SIGNIFICANT CHANGE UP (ref 5–17)
BUN SERPL-MCNC: 80 MG/DL — HIGH (ref 7–23)
CALCIUM SERPL-MCNC: 8.5 MG/DL — SIGNIFICANT CHANGE UP (ref 8.4–10.5)
CHLORIDE SERPL-SCNC: 112 MMOL/L — HIGH (ref 96–108)
CO2 SERPL-SCNC: 16 MMOL/L — LOW (ref 22–31)
CREAT SERPL-MCNC: 3.03 MG/DL — HIGH (ref 0.5–1.3)
GLUCOSE SERPL-MCNC: 102 MG/DL — HIGH (ref 70–99)
POTASSIUM SERPL-MCNC: 5.5 MMOL/L — HIGH (ref 3.5–5.3)
POTASSIUM SERPL-SCNC: 5.5 MMOL/L — HIGH (ref 3.5–5.3)
SODIUM SERPL-SCNC: 140 MMOL/L — SIGNIFICANT CHANGE UP (ref 135–145)

## 2017-10-13 PROCEDURE — 99232 SBSQ HOSP IP/OBS MODERATE 35: CPT

## 2017-10-13 RX ORDER — SENNA PLUS 8.6 MG/1
2 TABLET ORAL AT BEDTIME
Qty: 0 | Refills: 0 | Status: DISCONTINUED | OUTPATIENT
Start: 2017-10-13 | End: 2017-10-16

## 2017-10-13 RX ORDER — CALCIUM CARBONATE 500(1250)
2 TABLET ORAL ONCE
Qty: 0 | Refills: 0 | Status: DISCONTINUED | OUTPATIENT
Start: 2017-10-13 | End: 2017-10-16

## 2017-10-13 RX ORDER — SODIUM BICARBONATE 1 MEQ/ML
650 SYRINGE (ML) INTRAVENOUS THREE TIMES A DAY
Qty: 0 | Refills: 0 | Status: DISCONTINUED | OUTPATIENT
Start: 2017-10-13 | End: 2017-10-16

## 2017-10-13 RX ORDER — PANTOPRAZOLE SODIUM 20 MG/1
40 TABLET, DELAYED RELEASE ORAL
Qty: 0 | Refills: 0 | Status: DISCONTINUED | OUTPATIENT
Start: 2017-10-13 | End: 2017-10-16

## 2017-10-13 RX ORDER — DOCUSATE SODIUM 100 MG
100 CAPSULE ORAL THREE TIMES A DAY
Qty: 0 | Refills: 0 | Status: DISCONTINUED | OUTPATIENT
Start: 2017-10-13 | End: 2017-10-16

## 2017-10-13 RX ORDER — POLYETHYLENE GLYCOL 3350 17 G/17G
17 POWDER, FOR SOLUTION ORAL DAILY
Qty: 0 | Refills: 0 | Status: DISCONTINUED | OUTPATIENT
Start: 2017-10-13 | End: 2017-10-16

## 2017-10-13 RX ADMIN — Medication 650 MILLIGRAM(S): at 23:11

## 2017-10-13 RX ADMIN — HEPARIN SODIUM 5000 UNIT(S): 5000 INJECTION INTRAVENOUS; SUBCUTANEOUS at 06:52

## 2017-10-13 RX ADMIN — VALSARTAN 320 MILLIGRAM(S): 80 TABLET ORAL at 06:52

## 2017-10-13 RX ADMIN — Medication 650 MILLIGRAM(S): at 15:00

## 2017-10-13 RX ADMIN — OXYCODONE HYDROCHLORIDE 5 MILLIGRAM(S): 5 TABLET ORAL at 10:31

## 2017-10-13 RX ADMIN — Medication 25 MILLIGRAM(S): at 18:04

## 2017-10-13 RX ADMIN — Medication 30 MILLILITER(S): at 18:03

## 2017-10-13 RX ADMIN — OXYCODONE HYDROCHLORIDE 5 MILLIGRAM(S): 5 TABLET ORAL at 19:03

## 2017-10-13 RX ADMIN — Medication 110 MILLIGRAM(S): at 15:49

## 2017-10-13 RX ADMIN — Medication 110 MILLIGRAM(S): at 02:18

## 2017-10-13 RX ADMIN — AMLODIPINE BESYLATE 10 MILLIGRAM(S): 2.5 TABLET ORAL at 23:11

## 2017-10-13 RX ADMIN — Medication 40 MILLIGRAM(S): at 06:52

## 2017-10-13 RX ADMIN — PANTOPRAZOLE SODIUM 40 MILLIGRAM(S): 20 TABLET, DELAYED RELEASE ORAL at 18:04

## 2017-10-13 RX ADMIN — OXYCODONE HYDROCHLORIDE 5 MILLIGRAM(S): 5 TABLET ORAL at 16:33

## 2017-10-13 RX ADMIN — HEPARIN SODIUM 5000 UNIT(S): 5000 INJECTION INTRAVENOUS; SUBCUTANEOUS at 23:11

## 2017-10-13 RX ADMIN — OXYCODONE HYDROCHLORIDE 5 MILLIGRAM(S): 5 TABLET ORAL at 01:04

## 2017-10-13 RX ADMIN — OXYCODONE HYDROCHLORIDE 5 MILLIGRAM(S): 5 TABLET ORAL at 00:34

## 2017-10-13 RX ADMIN — OXYCODONE HYDROCHLORIDE 5 MILLIGRAM(S): 5 TABLET ORAL at 11:30

## 2017-10-13 RX ADMIN — HEPARIN SODIUM 5000 UNIT(S): 5000 INJECTION INTRAVENOUS; SUBCUTANEOUS at 15:00

## 2017-10-13 RX ADMIN — Medication 200 MILLIGRAM(S): at 23:11

## 2017-10-13 NOTE — PROGRESS NOTE ADULT - ASSESSMENT
pt is immunosuppressed and has zoster involving more than two  dermatomes.    and treat with IV acyclovir 5mg/kg q 12 hr  ( creat cl about 20-30 )  airborne insolation   no change in zoster this am but only 24 hrs of therapy so far.    follow up WBC  ( usually not this high with zoster)  we may be able to push dose of acyclovir higher but I want to see the trend of her creat, as it is very renal toxic

## 2017-10-13 NOTE — PROGRESS NOTE ADULT - ASSESSMENT
46 yo Female w/SLE, lupus nephritis, Herpes Zoster:  1. Zoster - IV Acyclovir per ID  analgesics prn   Renal,f/u   isolation until lesions crusted  2. SLE, lupus nephritis - c/w 40mg Prednisone daily, Cellcept held per Rheum  3. DVT prophylaxis  id f.u appreciated     base

## 2017-10-13 NOTE — PROGRESS NOTE ADULT - ASSESSMENT
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

## 2017-10-14 LAB
-  AMPICILLIN: SIGNIFICANT CHANGE UP
-  CIPROFLOXACIN: SIGNIFICANT CHANGE UP
-  NITROFURANTOIN: SIGNIFICANT CHANGE UP
-  TETRACYCLINE: SIGNIFICANT CHANGE UP
-  VANCOMYCIN: SIGNIFICANT CHANGE UP
ANION GAP SERPL CALC-SCNC: 16 MMOL/L — SIGNIFICANT CHANGE UP (ref 5–17)
BUN SERPL-MCNC: 77 MG/DL — HIGH (ref 7–23)
CALCIUM SERPL-MCNC: 8.9 MG/DL — SIGNIFICANT CHANGE UP (ref 8.4–10.5)
CHLORIDE SERPL-SCNC: 110 MMOL/L — HIGH (ref 96–108)
CO2 SERPL-SCNC: 13 MMOL/L — LOW (ref 22–31)
CREAT SERPL-MCNC: 3.13 MG/DL — HIGH (ref 0.5–1.3)
CULTURE RESULTS: SIGNIFICANT CHANGE UP
GLUCOSE SERPL-MCNC: 153 MG/DL — HIGH (ref 70–99)
HCT VFR BLD CALC: 22.5 % — LOW (ref 34.5–45)
HGB BLD-MCNC: 8 G/DL — LOW (ref 11.5–15.5)
MCHC RBC-ENTMCNC: 25.6 PG — LOW (ref 27–34)
MCHC RBC-ENTMCNC: 35.6 GM/DL — SIGNIFICANT CHANGE UP (ref 32–36)
MCV RBC AUTO: 72.1 FL — LOW (ref 80–100)
METHOD TYPE: SIGNIFICANT CHANGE UP
ORGANISM # SPEC MICROSCOPIC CNT: SIGNIFICANT CHANGE UP
ORGANISM # SPEC MICROSCOPIC CNT: SIGNIFICANT CHANGE UP
PLATELET # BLD AUTO: 188 K/UL — SIGNIFICANT CHANGE UP (ref 150–400)
POTASSIUM SERPL-MCNC: 4.9 MMOL/L — SIGNIFICANT CHANGE UP (ref 3.5–5.3)
POTASSIUM SERPL-SCNC: 4.9 MMOL/L — SIGNIFICANT CHANGE UP (ref 3.5–5.3)
RBC # BLD: 3.12 M/UL — LOW (ref 3.8–5.2)
RBC # FLD: 17.9 % — HIGH (ref 10.3–14.5)
SODIUM SERPL-SCNC: 139 MMOL/L — SIGNIFICANT CHANGE UP (ref 135–145)
SPECIMEN SOURCE: SIGNIFICANT CHANGE UP
WBC # BLD: 26.01 K/UL — HIGH (ref 3.8–10.5)
WBC # FLD AUTO: 26.01 K/UL — HIGH (ref 3.8–10.5)

## 2017-10-14 RX ORDER — ONDANSETRON 8 MG/1
4 TABLET, FILM COATED ORAL ONCE
Qty: 0 | Refills: 0 | Status: COMPLETED | OUTPATIENT
Start: 2017-10-14 | End: 2017-10-14

## 2017-10-14 RX ADMIN — SENNA PLUS 2 TABLET(S): 8.6 TABLET ORAL at 22:13

## 2017-10-14 RX ADMIN — HEPARIN SODIUM 5000 UNIT(S): 5000 INJECTION INTRAVENOUS; SUBCUTANEOUS at 21:13

## 2017-10-14 RX ADMIN — Medication 40 MILLIGRAM(S): at 06:32

## 2017-10-14 RX ADMIN — OXYCODONE HYDROCHLORIDE 5 MILLIGRAM(S): 5 TABLET ORAL at 22:06

## 2017-10-14 RX ADMIN — POLYETHYLENE GLYCOL 3350 17 GRAM(S): 17 POWDER, FOR SOLUTION ORAL at 12:41

## 2017-10-14 RX ADMIN — OXYCODONE HYDROCHLORIDE 5 MILLIGRAM(S): 5 TABLET ORAL at 04:19

## 2017-10-14 RX ADMIN — Medication 25 MILLIGRAM(S): at 06:32

## 2017-10-14 RX ADMIN — OXYCODONE HYDROCHLORIDE 5 MILLIGRAM(S): 5 TABLET ORAL at 23:06

## 2017-10-14 RX ADMIN — Medication 650 MILLIGRAM(S): at 15:54

## 2017-10-14 RX ADMIN — ONDANSETRON 4 MILLIGRAM(S): 8 TABLET, FILM COATED ORAL at 21:07

## 2017-10-14 RX ADMIN — Medication 110 MILLIGRAM(S): at 02:58

## 2017-10-14 RX ADMIN — Medication 650 MILLIGRAM(S): at 21:12

## 2017-10-14 RX ADMIN — OXYCODONE HYDROCHLORIDE 5 MILLIGRAM(S): 5 TABLET ORAL at 10:49

## 2017-10-14 RX ADMIN — AMLODIPINE BESYLATE 10 MILLIGRAM(S): 2.5 TABLET ORAL at 21:13

## 2017-10-14 RX ADMIN — Medication 200 MILLIGRAM(S): at 21:19

## 2017-10-14 RX ADMIN — Medication 650 MILLIGRAM(S): at 06:32

## 2017-10-14 RX ADMIN — HEPARIN SODIUM 5000 UNIT(S): 5000 INJECTION INTRAVENOUS; SUBCUTANEOUS at 06:32

## 2017-10-14 RX ADMIN — HEPARIN SODIUM 5000 UNIT(S): 5000 INJECTION INTRAVENOUS; SUBCUTANEOUS at 15:54

## 2017-10-14 RX ADMIN — OXYCODONE HYDROCHLORIDE 5 MILLIGRAM(S): 5 TABLET ORAL at 11:30

## 2017-10-14 RX ADMIN — Medication 25 MILLIGRAM(S): at 17:31

## 2017-10-14 RX ADMIN — PANTOPRAZOLE SODIUM 40 MILLIGRAM(S): 20 TABLET, DELAYED RELEASE ORAL at 06:32

## 2017-10-14 RX ADMIN — VALSARTAN 320 MILLIGRAM(S): 80 TABLET ORAL at 06:33

## 2017-10-14 NOTE — PROGRESS NOTE ADULT - ASSESSMENT
1-CKD4 related to lupus nephritis admitted with shingles-renal function slightly improved from recent episode of [place]  Need to be careful with acyclovir dosing in light of potential nephrotoxicity   Dietary K restriction  Continue PO NaHCO3  OK to continue valsartan

## 2017-10-14 NOTE — PROGRESS NOTE ADULT - ASSESSMENT
46 yo Female w/SLE, lupus nephritis, Herpes Zoster:  1. Zoster - IV Acyclovir per ID  analgesics prn   Renal,f/u noted   isolation until lesions crusted  2. SLE, lupus nephritis - c/w 40mg Prednisone daily, Cellcept held per Rheum  3. DVT prophylaxis  id f.u appreciated   cont htn med    base

## 2017-10-15 LAB
ANION GAP SERPL CALC-SCNC: 12 MMOL/L — SIGNIFICANT CHANGE UP (ref 5–17)
BUN SERPL-MCNC: 77 MG/DL — HIGH (ref 7–23)
CALCIUM SERPL-MCNC: 8.5 MG/DL — SIGNIFICANT CHANGE UP (ref 8.4–10.5)
CHLORIDE SERPL-SCNC: 111 MMOL/L — HIGH (ref 96–108)
CO2 SERPL-SCNC: 17 MMOL/L — LOW (ref 22–31)
CREAT SERPL-MCNC: 3.32 MG/DL — HIGH (ref 0.5–1.3)
GLUCOSE SERPL-MCNC: 92 MG/DL — SIGNIFICANT CHANGE UP (ref 70–99)
HCT VFR BLD CALC: 22.2 % — LOW (ref 34.5–45)
HGB BLD-MCNC: 8 G/DL — LOW (ref 11.5–15.5)
MCHC RBC-ENTMCNC: 28.1 PG — SIGNIFICANT CHANGE UP (ref 27–34)
MCHC RBC-ENTMCNC: 36.3 GM/DL — HIGH (ref 32–36)
MCV RBC AUTO: 77.5 FL — LOW (ref 80–100)
PLATELET # BLD AUTO: 161 K/UL — SIGNIFICANT CHANGE UP (ref 150–400)
POTASSIUM SERPL-MCNC: 5 MMOL/L — SIGNIFICANT CHANGE UP (ref 3.5–5.3)
POTASSIUM SERPL-SCNC: 5 MMOL/L — SIGNIFICANT CHANGE UP (ref 3.5–5.3)
RBC # BLD: 2.86 M/UL — LOW (ref 3.8–5.2)
RBC # FLD: 18 % — HIGH (ref 10.3–14.5)
SODIUM SERPL-SCNC: 140 MMOL/L — SIGNIFICANT CHANGE UP (ref 135–145)
WBC # BLD: 29.3 K/UL — HIGH (ref 3.8–10.5)
WBC # FLD AUTO: 29.3 K/UL — HIGH (ref 3.8–10.5)

## 2017-10-15 PROCEDURE — 99232 SBSQ HOSP IP/OBS MODERATE 35: CPT

## 2017-10-15 RX ORDER — ACETAMINOPHEN 500 MG
650 TABLET ORAL EVERY 6 HOURS
Qty: 0 | Refills: 0 | Status: DISCONTINUED | OUTPATIENT
Start: 2017-10-15 | End: 2017-10-16

## 2017-10-15 RX ORDER — SODIUM CHLORIDE 9 MG/ML
1000 INJECTION INTRAMUSCULAR; INTRAVENOUS; SUBCUTANEOUS
Qty: 0 | Refills: 0 | Status: DISCONTINUED | OUTPATIENT
Start: 2017-10-15 | End: 2017-10-16

## 2017-10-15 RX ADMIN — OXYCODONE HYDROCHLORIDE 5 MILLIGRAM(S): 5 TABLET ORAL at 23:53

## 2017-10-15 RX ADMIN — Medication 200 MILLIGRAM(S): at 21:25

## 2017-10-15 RX ADMIN — HEPARIN SODIUM 5000 UNIT(S): 5000 INJECTION INTRAVENOUS; SUBCUTANEOUS at 21:24

## 2017-10-15 RX ADMIN — HEPARIN SODIUM 5000 UNIT(S): 5000 INJECTION INTRAVENOUS; SUBCUTANEOUS at 13:26

## 2017-10-15 RX ADMIN — OXYCODONE HYDROCHLORIDE 5 MILLIGRAM(S): 5 TABLET ORAL at 07:47

## 2017-10-15 RX ADMIN — Medication 25 MILLIGRAM(S): at 17:28

## 2017-10-15 RX ADMIN — Medication 650 MILLIGRAM(S): at 21:24

## 2017-10-15 RX ADMIN — Medication 25 MILLIGRAM(S): at 07:08

## 2017-10-15 RX ADMIN — SENNA PLUS 2 TABLET(S): 8.6 TABLET ORAL at 21:23

## 2017-10-15 RX ADMIN — Medication 110 MILLIGRAM(S): at 13:24

## 2017-10-15 RX ADMIN — OXYCODONE HYDROCHLORIDE 5 MILLIGRAM(S): 5 TABLET ORAL at 16:30

## 2017-10-15 RX ADMIN — PANTOPRAZOLE SODIUM 40 MILLIGRAM(S): 20 TABLET, DELAYED RELEASE ORAL at 07:08

## 2017-10-15 RX ADMIN — VALSARTAN 320 MILLIGRAM(S): 80 TABLET ORAL at 09:30

## 2017-10-15 RX ADMIN — SODIUM CHLORIDE 75 MILLILITER(S): 9 INJECTION INTRAMUSCULAR; INTRAVENOUS; SUBCUTANEOUS at 17:10

## 2017-10-15 RX ADMIN — Medication 100 MILLIGRAM(S): at 13:26

## 2017-10-15 RX ADMIN — Medication 650 MILLIGRAM(S): at 09:31

## 2017-10-15 RX ADMIN — Medication 100 MILLIGRAM(S): at 07:07

## 2017-10-15 RX ADMIN — AMLODIPINE BESYLATE 10 MILLIGRAM(S): 2.5 TABLET ORAL at 21:23

## 2017-10-15 RX ADMIN — Medication 650 MILLIGRAM(S): at 14:00

## 2017-10-15 RX ADMIN — OXYCODONE HYDROCHLORIDE 5 MILLIGRAM(S): 5 TABLET ORAL at 23:23

## 2017-10-15 RX ADMIN — POLYETHYLENE GLYCOL 3350 17 GRAM(S): 17 POWDER, FOR SOLUTION ORAL at 12:20

## 2017-10-15 RX ADMIN — OXYCODONE HYDROCHLORIDE 5 MILLIGRAM(S): 5 TABLET ORAL at 15:42

## 2017-10-15 RX ADMIN — Medication 110 MILLIGRAM(S): at 01:00

## 2017-10-15 RX ADMIN — Medication 100 MILLIGRAM(S): at 21:24

## 2017-10-15 RX ADMIN — Medication 40 MILLIGRAM(S): at 09:31

## 2017-10-15 RX ADMIN — Medication 650 MILLIGRAM(S): at 13:26

## 2017-10-15 RX ADMIN — HEPARIN SODIUM 5000 UNIT(S): 5000 INJECTION INTRAVENOUS; SUBCUTANEOUS at 07:00

## 2017-10-15 RX ADMIN — Medication 650 MILLIGRAM(S): at 13:23

## 2017-10-15 RX ADMIN — OXYCODONE HYDROCHLORIDE 5 MILLIGRAM(S): 5 TABLET ORAL at 07:07

## 2017-10-15 NOTE — PROGRESS NOTE ADULT - ASSESSMENT
INDIA/ CKD 3- class IV lupus nephritis  herpes zoster/ multiple dermatomes  rectal pain/ constipation   - rash seems slightly improved- continue IV acyclovir for now  - continue pain control/ stool softeners  - cellcept on hold for now- resume once ok with ID  - monitor renal function closely/ caution with IV acyclovir dosing   - discharge plans as per medicine INDIA/ CKD 3- class IV lupus nephritis  herpes zoster/ multiple dermatomes  rectal pain/ constipation   - rash seems slightly improved- continue IV acyclovir for now  - continue pain control/ stool softeners  - cellcept on hold for now- resume once ok with ID  - monitor WBC- likely in part due to steroids   - monitor renal function closely/ caution with IV acyclovir dosing   - discharge plans as per medicine

## 2017-10-15 NOTE — PROGRESS NOTE ADULT - ASSESSMENT
48 yo Female w/SLE, lupus nephritis, Herpes Zoster:  1. Zoster - IV Acyclovir per ID  analgesics prn   Renal,f/u noted/iv fid   isolation until lesions crusted  2. SLE, lupus nephritis - c/w 40mg Prednisone daily, Cellcept held per Rheum  3. DVT prophylaxis  id f.u appreciated   cont htn med    base

## 2017-10-16 ENCOUNTER — TRANSCRIPTION ENCOUNTER (OUTPATIENT)
Age: 47
End: 2017-10-16

## 2017-10-16 VITALS
TEMPERATURE: 98 F | OXYGEN SATURATION: 100 % | SYSTOLIC BLOOD PRESSURE: 150 MMHG | DIASTOLIC BLOOD PRESSURE: 97 MMHG | RESPIRATION RATE: 17 BRPM | HEART RATE: 81 BPM

## 2017-10-16 PROCEDURE — 87040 BLOOD CULTURE FOR BACTERIA: CPT

## 2017-10-16 PROCEDURE — 85652 RBC SED RATE AUTOMATED: CPT

## 2017-10-16 PROCEDURE — 96374 THER/PROPH/DIAG INJ IV PUSH: CPT

## 2017-10-16 PROCEDURE — 86140 C-REACTIVE PROTEIN: CPT

## 2017-10-16 PROCEDURE — 85730 THROMBOPLASTIN TIME PARTIAL: CPT

## 2017-10-16 PROCEDURE — 81001 URINALYSIS AUTO W/SCOPE: CPT

## 2017-10-16 PROCEDURE — 87086 URINE CULTURE/COLONY COUNT: CPT

## 2017-10-16 PROCEDURE — 85027 COMPLETE CBC AUTOMATED: CPT

## 2017-10-16 PROCEDURE — 99285 EMERGENCY DEPT VISIT HI MDM: CPT | Mod: 25

## 2017-10-16 PROCEDURE — 99232 SBSQ HOSP IP/OBS MODERATE 35: CPT

## 2017-10-16 PROCEDURE — 85610 PROTHROMBIN TIME: CPT

## 2017-10-16 PROCEDURE — 80053 COMPREHEN METABOLIC PANEL: CPT

## 2017-10-16 PROCEDURE — 87186 SC STD MICRODIL/AGAR DIL: CPT

## 2017-10-16 PROCEDURE — 90686 IIV4 VACC NO PRSV 0.5 ML IM: CPT

## 2017-10-16 PROCEDURE — 80048 BASIC METABOLIC PNL TOTAL CA: CPT

## 2017-10-16 RX ORDER — IBUPROFEN 200 MG
1 TABLET ORAL
Qty: 0 | Refills: 0 | COMMUNITY

## 2017-10-16 RX ORDER — VALACYCLOVIR 500 MG/1
1 TABLET, FILM COATED ORAL
Qty: 0 | Refills: 0 | COMMUNITY
Start: 2017-10-16

## 2017-10-16 RX ORDER — POLYETHYLENE GLYCOL 3350 17 G/17G
17 POWDER, FOR SOLUTION ORAL
Qty: 0 | Refills: 0 | COMMUNITY
Start: 2017-10-16

## 2017-10-16 RX ORDER — POLYETHYLENE GLYCOL 3350 17 G/17G
17 POWDER, FOR SOLUTION ORAL
Qty: 510 | Refills: 0
Start: 2017-10-16 | End: 2017-11-15

## 2017-10-16 RX ORDER — SODIUM BICARBONATE 1 MEQ/ML
1 SYRINGE (ML) INTRAVENOUS
Qty: 90 | Refills: 0
Start: 2017-10-16 | End: 2017-11-15

## 2017-10-16 RX ORDER — VALACYCLOVIR 500 MG/1
500 TABLET, FILM COATED ORAL
Qty: 0 | Refills: 0 | Status: DISCONTINUED | OUTPATIENT
Start: 2017-10-16 | End: 2017-10-16

## 2017-10-16 RX ORDER — VALACYCLOVIR 500 MG/1
1 TABLET, FILM COATED ORAL
Qty: 10 | Refills: 0
Start: 2017-10-16 | End: 2017-10-21

## 2017-10-16 RX ORDER — OXYCODONE HYDROCHLORIDE 5 MG/1
1 TABLET ORAL
Qty: 20 | Refills: 0
Start: 2017-10-16 | End: 2017-10-21

## 2017-10-16 RX ORDER — FAMOTIDINE 10 MG/ML
1 INJECTION INTRAVENOUS
Qty: 30 | Refills: 0
Start: 2017-10-16 | End: 2017-11-15

## 2017-10-16 RX ORDER — ACETAMINOPHEN 500 MG
2 TABLET ORAL
Qty: 0 | Refills: 0 | COMMUNITY

## 2017-10-16 RX ORDER — SODIUM BICARBONATE 1 MEQ/ML
1 SYRINGE (ML) INTRAVENOUS
Qty: 0 | Refills: 0 | COMMUNITY
Start: 2017-10-16

## 2017-10-16 RX ADMIN — Medication 40 MILLIGRAM(S): at 05:27

## 2017-10-16 RX ADMIN — OXYCODONE HYDROCHLORIDE 5 MILLIGRAM(S): 5 TABLET ORAL at 06:00

## 2017-10-16 RX ADMIN — HEPARIN SODIUM 5000 UNIT(S): 5000 INJECTION INTRAVENOUS; SUBCUTANEOUS at 05:28

## 2017-10-16 RX ADMIN — OXYCODONE HYDROCHLORIDE 5 MILLIGRAM(S): 5 TABLET ORAL at 05:26

## 2017-10-16 RX ADMIN — VALSARTAN 320 MILLIGRAM(S): 80 TABLET ORAL at 05:27

## 2017-10-16 RX ADMIN — Medication 110 MILLIGRAM(S): at 00:10

## 2017-10-16 RX ADMIN — Medication 25 MILLIGRAM(S): at 05:27

## 2017-10-16 RX ADMIN — OXYCODONE HYDROCHLORIDE 5 MILLIGRAM(S): 5 TABLET ORAL at 12:15

## 2017-10-16 RX ADMIN — INFLUENZA VIRUS VACCINE 0.5 MILLILITER(S): 15; 15; 15; 15 SUSPENSION INTRAMUSCULAR at 14:45

## 2017-10-16 RX ADMIN — SODIUM CHLORIDE 75 MILLILITER(S): 9 INJECTION INTRAMUSCULAR; INTRAVENOUS; SUBCUTANEOUS at 00:10

## 2017-10-16 RX ADMIN — Medication 650 MILLIGRAM(S): at 14:57

## 2017-10-16 RX ADMIN — Medication 650 MILLIGRAM(S): at 05:27

## 2017-10-16 RX ADMIN — OXYCODONE HYDROCHLORIDE 5 MILLIGRAM(S): 5 TABLET ORAL at 11:15

## 2017-10-16 RX ADMIN — Medication 100 MILLIGRAM(S): at 05:27

## 2017-10-16 RX ADMIN — PANTOPRAZOLE SODIUM 40 MILLIGRAM(S): 20 TABLET, DELAYED RELEASE ORAL at 05:27

## 2017-10-16 NOTE — PROGRESS NOTE ADULT - ASSESSMENT
INDIA/ CKD 3- class IV lupus nephritis  herpes zoster  constipation likely due to oxycodone  rash crusting - as per ID to chg to Po acyclovir  creat 3.3 - was 3.6 prior to admit  no objection to dc   cont prednisone, diovan, sodium bicarbonate  chg PPI back to H2 blocker  cellcept to remain on  hold   f/u Dr Greco later this week

## 2017-10-16 NOTE — PROGRESS NOTE ADULT - SUBJECTIVE AND OBJECTIVE BOX
CHIEF COMPLAINT:Patient feeling better      	        PAST MEDICAL & SURGICAL HISTORY:  Abnormal uterine and vaginal bleeding, unspecified  Iron deficiency anemia  Lupus nephritis  SLE (systemic lupus erythematosus)  Obesity  Hypertension  Benign tumor: h/o right thumb          REVIEW OF SYSTEMS:  CONSTITUTIONAL: No fever, weight loss, or fatigue  EYES: No eye pain, visual disturbances, or discharge  NECK: No pain or stiffness  RESPIRATORY: No cough, wheezing, chills or hemoptysis; No Shortness of Breath  CARDIOVASCULAR: No chest pain, palpitations, passing out, dizziness, or leg swelling  GASTROINTESTINAL: No abdominal or epigastric pain. No nausea, vomiting, or hematemesis; No diarrhea or constipation. No melena or hematochezia.  GENITOURINARY: No dysuria, frequency, hematuria, or incontinence  NEUROLOGICAL: No headaches, memory loss, loss of strength, numbness, or tremors  SKIN: pain at site of lesions  LYMPH Nodes: No enlarged glands  ENDOCRINE: No heat or cold intolerance; No hair loss  MUSCULOSKELETAL: No joint pain or swelling; No muscle, back, or extremity pain    Medications:  MEDICATIONS  (STANDING):  acyclovir IVPB 500 milliGRAM(s) IV Intermittent every 12 hours  amLODIPine   Tablet 10 milliGRAM(s) Oral at bedtime  calcium carbonate 500 mG (Tums) Chewable 2 Tablet(s) Chew once  docusate sodium 100 milliGRAM(s) Oral three times a day  heparin  Injectable 5000 Unit(s) SubCutaneous every 8 hours  hydroxychloroquine 200 milliGRAM(s) Oral at bedtime  influenza   Vaccine 0.5 milliLiter(s) IntraMuscular once  metoprolol 25 milliGRAM(s) Oral two times a day  pantoprazole    Tablet 40 milliGRAM(s) Oral before breakfast  polyethylene glycol 3350 17 Gram(s) Oral daily  predniSONE   Tablet 40 milliGRAM(s) Oral daily  senna 2 Tablet(s) Oral at bedtime  sodium bicarbonate 650 milliGRAM(s) Oral three times a day  valsartan 320 milliGRAM(s) Oral daily    MEDICATIONS  (PRN):  aluminum hydroxide/magnesium hydroxide/simethicone Suspension 30 milliLiter(s) Oral every 6 hours PRN Dyspepsia  oxyCODONE    IR 5 milliGRAM(s) Oral every 6 hours PRN Moderate Pain (4 - 6)    	    PHYSICAL EXAM:  T(C): 36.4 (10-14-17 @ 04:12), Max: 36.4 (10-13-17 @ 12:08)  HR: 75 (10-14-17 @ 04:12) (69 - 75)  BP: 120/82 (10-14-17 @ 04:12) (120/82 - 147/90)  RR: 18 (10-14-17 @ 04:12) (16 - 18)  SpO2: 100% (10-14-17 @ 04:12) (98% - 100%)  Wt(kg): --  I&O's Summary    13 Oct 2017 07:01  -  14 Oct 2017 07:00  --------------------------------------------------------  IN: 1400 mL / OUT: 0 mL / NET: 1400 mL        Appearance: Normal	  HEENT:   Normal oral mucosa, PERRL, EOMI	  Lymphatic: No lymphadenopathy  Cardiovascular: Normal S1 S2, No JVD, No murmurs, No edema  Respiratory: Lungs clear to auscultation	  Psychiatry: A & O x 3, Mood & affect appropriate  Gastrointestinal:  Soft, Non-tender, + BS	  Skin: No rashes, No ecchymoses, No cyanosis	  Neurologic: Non-focal  Extremities: Normal range of motion, No clubbing, cyanosis or edema  Vascular: Peripheral pulses palpable 2+ bilaterally    TELEMETRY: 	    ECG:  	  RADIOLOGY:  OTHER: 	  	  LABS:	 	    CARDIAC MARKERS:                                8.4    30.5  )-----------( 191      ( 12 Oct 2017 12:40 )             23.6     10-14    139  |  110<H>  |  77<H>  ----------------------------<  153<H>  4.9   |  13<L>  |  3.13<H>    Ca    8.9      14 Oct 2017 10:12    TPro  6.6  /  Alb  3.4  /  TBili  0.3  /  DBili  x   /  AST  22  /  ALT  35  /  AlkPhos  98  10-12    proBNP:   Lipid Profile:   HgA1c:   TSH:
CHIEF COMPLAINT:Patient feels better    	        PAST MEDICAL & SURGICAL HISTORY:  Abnormal uterine and vaginal bleeding, unspecified  Iron deficiency anemia  Lupus nephritis  SLE (systemic lupus erythematosus)  Obesity  Hypertension  Benign tumor: h/o right thumb          REVIEW OF SYSTEMS:  CONSTITUTIONAL: No fever, weight loss, or fatigue  EYES: No eye pain, visual disturbances, or discharge  NECK: No pain or stiffness  RESPIRATORY: No cough, wheezing, chills or hemoptysis; No Shortness of Breath  CARDIOVASCULAR: No chest pain, palpitations, passing out, dizziness, or leg swelling  GASTROINTESTINAL: No abdominal or epigastric pain. No nausea, vomiting, or hematemesis; No diarrhea or constipation. No melena or hematochezia.  GENITOURINARY: No dysuria, frequency, hematuria, or incontinence  NEUROLOGICAL: No headaches, memory loss, loss of strength, numbness, or tremors  SKIN: lesions with less pain  LYMPH Nodes: No enlarged glands  ENDOCRINE: No heat or cold intolerance; No hair loss  MUSCULOSKELETAL: No joint pain or swelling; No muscle, back, or extremity pain    Medications:  MEDICATIONS  (STANDING):  amLODIPine   Tablet 10 milliGRAM(s) Oral at bedtime  calcium carbonate 500 mG (Tums) Chewable 2 Tablet(s) Chew once  docusate sodium 100 milliGRAM(s) Oral three times a day  heparin  Injectable 5000 Unit(s) SubCutaneous every 8 hours  hydroxychloroquine 200 milliGRAM(s) Oral at bedtime  influenza   Vaccine 0.5 milliLiter(s) IntraMuscular once  metoprolol 25 milliGRAM(s) Oral two times a day  pantoprazole    Tablet 40 milliGRAM(s) Oral before breakfast  polyethylene glycol 3350 17 Gram(s) Oral daily  predniSONE   Tablet 40 milliGRAM(s) Oral daily  senna 2 Tablet(s) Oral at bedtime  sodium bicarbonate 650 milliGRAM(s) Oral three times a day  sodium chloride 0.9%. 1000 milliLiter(s) (75 mL/Hr) IV Continuous <Continuous>  valACYclovir 500 milliGRAM(s) Oral two times a day  valsartan 320 milliGRAM(s) Oral daily    MEDICATIONS  (PRN):  acetaminophen   Tablet. 650 milliGRAM(s) Oral every 6 hours PRN Mild Pain (1 - 3)  aluminum hydroxide/magnesium hydroxide/simethicone Suspension 30 milliLiter(s) Oral every 6 hours PRN Dyspepsia  oxyCODONE    IR 5 milliGRAM(s) Oral every 6 hours PRN Moderate Pain (4 - 6)    	    PHYSICAL EXAM:  T(C): 36.8 (10-16-17 @ 04:22), Max: 36.8 (10-15-17 @ 21:21)  HR: 80 (10-16-17 @ 05:29) (77 - 82)  BP: 140/90 (10-16-17 @ 05:29) (140/90 - 158/89)  RR: 18 (10-16-17 @ 04:22) (18 - 18)  SpO2: 100% (10-16-17 @ 04:22) (97% - 100%)  Wt(kg): --  I&O's Summary    15 Oct 2017 07:01  -  16 Oct 2017 07:00  --------------------------------------------------------  IN: 3230 mL / OUT: 0 mL / NET: 3230 mL        Appearance: Normal	  HEENT:   Normal oral mucosa, PERRL, EOMI	  Lymphatic: No lymphadenopathy  Cardiovascular: Normal S1 S2, No JVD, No murmurs, No edema  Respiratory: Lungs clear to auscultation	  Psychiatry: A & O x 3, Mood & affect appropriate  Gastrointestinal:  Soft, Non-tender, + BS	  Skin: lesions on flank crusting 	  Neurologic: Non-focal  Extremities: Normal range of motion, No clubbing, cyanosis or edema  Vascular: Peripheral pulses palpable 2+ bilaterally    TELEMETRY: 	    ECG:  	  RADIOLOGY:  OTHER: 	  	  LABS:	 	    CARDIAC MARKERS:                                8.0    29.3  )-----------( 161      ( 15 Oct 2017 13:45 )             22.2     10-15    140  |  111<H>  |  77<H>  ----------------------------<  92  5.0   |  17<L>  |  3.32<H>    Ca    8.5      15 Oct 2017 12:08      proBNP:   Lipid Profile:   HgA1c:   TSH:
CHIEF COMPLAINT:Patient with pain at lesion site    	        PAST MEDICAL & SURGICAL HISTORY:  Abnormal uterine and vaginal bleeding, unspecified  Iron deficiency anemia  Lupus nephritis  SLE (systemic lupus erythematosus)  Obesity  Hypertension  Benign tumor: h/o right thumb          REVIEW OF SYSTEMS:  CONSTITUTIONAL: No fever, weight loss, or fatigue  EYES: No eye pain, visual disturbances, or discharge  NECK: No pain or stiffness  RESPIRATORY: No cough, wheezing, chills or hemoptysis; No Shortness of Breath  CARDIOVASCULAR: No chest pain, palpitations, passing out, dizziness, or leg swelling  GASTROINTESTINAL: No abdominal or epigastric pain. No nausea, vomiting, or hematemesis; No diarrhea or constipation. No melena or hematochezia.  GENITOURINARY: No dysuria, frequency, hematuria, or incontinence  NEUROLOGICAL: No headaches, memory loss, loss of strength, numbness, or tremors  SKIN: lesions flank / buttock area with pain  LYMPH Nodes: No enlarged glands  ENDOCRINE: No heat or cold intolerance; No hair loss  MUSCULOSKELETAL: No joint pain or swelling; No muscle, back, or extremity pain    Medications:  MEDICATIONS  (STANDING):  acyclovir IVPB 500 milliGRAM(s) IV Intermittent every 12 hours  amLODIPine   Tablet 10 milliGRAM(s) Oral at bedtime  calcium carbonate 500 mG (Tums) Chewable 2 Tablet(s) Chew once  docusate sodium 100 milliGRAM(s) Oral three times a day  heparin  Injectable 5000 Unit(s) SubCutaneous every 8 hours  hydroxychloroquine 200 milliGRAM(s) Oral at bedtime  influenza   Vaccine 0.5 milliLiter(s) IntraMuscular once  metoprolol 25 milliGRAM(s) Oral two times a day  pantoprazole    Tablet 40 milliGRAM(s) Oral before breakfast  polyethylene glycol 3350 17 Gram(s) Oral daily  predniSONE   Tablet 40 milliGRAM(s) Oral daily  senna 2 Tablet(s) Oral at bedtime  sodium bicarbonate 650 milliGRAM(s) Oral three times a day  sodium chloride 0.9%. 1000 milliLiter(s) (75 mL/Hr) IV Continuous <Continuous>  valsartan 320 milliGRAM(s) Oral daily    MEDICATIONS  (PRN):  aluminum hydroxide/magnesium hydroxide/simethicone Suspension 30 milliLiter(s) Oral every 6 hours PRN Dyspepsia  oxyCODONE    IR 5 milliGRAM(s) Oral every 6 hours PRN Moderate Pain (4 - 6)    	    PHYSICAL EXAM:  T(C): 36.6 (10-15-17 @ 05:30), Max: 36.8 (10-14-17 @ 14:26)  HR: 79 (10-15-17 @ 05:30) (70 - 79)  BP: 146/72 (10-15-17 @ 05:30) (138/79 - 159/86)  RR: 18 (10-15-17 @ 05:30) (18 - 18)  SpO2: 98% (10-15-17 @ 05:30) (97% - 98%)  Wt(kg): --  I&O's Summary    14 Oct 2017 07:01  -  15 Oct 2017 07:00  --------------------------------------------------------  IN: 1440 mL / OUT: 0 mL / NET: 1440 mL        Appearance: Normal	  HEENT:   Normal oral mucosa, PERRL, EOMI	  Lymphatic: No lymphadenopathy  Cardiovascular: Normal S1 S2, No JVD, No murmurs, No edema  Respiratory: Lungs clear to auscultation	  Psychiatry: A & O x 3, Mood & affect appropriate  Gastrointestinal:  Soft, Non-tender, + BS	  Skin: r flank lesions / butttock  Neurologic: Non-focal  Extremities: Normal range of motion, No clubbing, cyanosis or edema  Vascular: Peripheral pulses palpable 2+ bilaterally    TELEMETRY: 	    ECG:  	  RADIOLOGY:  OTHER: 	  	  LABS:	 	    CARDIAC MARKERS:                                8.0    26.01 )-----------( 188      ( 14 Oct 2017 10:13 )             22.5     10-14    139  |  110<H>  |  77<H>  ----------------------------<  153<H>  4.9   |  13<L>  |  3.13<H>    Ca    8.9      14 Oct 2017 10:12      proBNP:   Lipid Profile:   HgA1c:   TSH:
CHIEF COMPLAINT:Patient without new complaints  see below    	        PAST MEDICAL & SURGICAL HISTORY:  Abnormal uterine and vaginal bleeding, unspecified  Iron deficiency anemia  Lupus nephritis  SLE (systemic lupus erythematosus)  Obesity  Hypertension  Benign tumor: h/o right thumb          REVIEW OF SYSTEMS:  CONSTITUTIONAL:some fatigue  EYES: No eye pain, visual disturbances, or discharge  NECK: No pain or stiffness  RESPIRATORY: No cough, wheezing, chills or hemoptysis; No Shortness of Breath  CARDIOVASCULAR: No chest pain, palpitations, passing out, dizziness, or leg swelling  GASTROINTESTINAL: No abdominal or epigastric pain. No nausea, vomiting, or hematemesis; No diarrhea or constipation. No melena or hematochezia.  GENITOURINARY: No dysuria, frequency, hematuria, or incontinence  NEUROLOGICAL: No headaches, memory loss, loss of strength, numbness, or tremors  SKIN: rash / lesion / painful at times     LYMPH Nodes: No enlarged glands  MUSCULOSKELETAL: No joint pain or swelling; No muscle, back, or extremity pain    Medications:  MEDICATIONS  (STANDING):  acyclovir IVPB 500 milliGRAM(s) IV Intermittent every 12 hours  amLODIPine   Tablet 10 milliGRAM(s) Oral at bedtime  calcium carbonate 500 mG (Tums) Chewable 2 Tablet(s) Chew once  heparin  Injectable 5000 Unit(s) SubCutaneous every 8 hours  hydroxychloroquine 200 milliGRAM(s) Oral at bedtime  influenza   Vaccine 0.5 milliLiter(s) IntraMuscular once  metoprolol 25 milliGRAM(s) Oral two times a day  predniSONE   Tablet 40 milliGRAM(s) Oral daily  valsartan 320 milliGRAM(s) Oral daily    MEDICATIONS  (PRN):  oxyCODONE    IR 5 milliGRAM(s) Oral every 6 hours PRN Moderate Pain (4 - 6)    	    PHYSICAL EXAM:  T(C): 37.1 (10-13-17 @ 06:46), Max: 37.1 (10-12-17 @ 11:18)  HR: 75 (10-13-17 @ 06:46) (71 - 83)  BP: 157/93 (10-13-17 @ 06:46) (134/81 - 160/98)  RR: 18 (10-13-17 @ 06:46) (16 - 18)  SpO2: 100% (10-13-17 @ 06:46) (96% - 100%)  Wt(kg): --  I&O's Summary    12 Oct 2017 07:01  -  13 Oct 2017 07:00  --------------------------------------------------------  IN: 465 mL / OUT: 0 mL / NET: 465 mL        Appearance: Normal	  HEENT:   Normal oral mucosa, PERRL, EOMI	  Lymphatic: No lymphadenopathy  Cardiovascular: Normal S1 S2, No JVD, No murmurs, No edema  Respiratory: Lungs clear to auscultation	  Psychiatry: A & O x 3, Mood & affect appropriate  Gastrointestinal:  Soft, Non-tender, + BS	  Skin: r sided abd / flank lesions   Neurologic: Non-focal from  Extremities: Normal range of motion, No clubbing, cyanosis or edema  Vascular: Peripheral pulses palpable 2+ bilaterally    TELEMETRY: 	    ECG:  	  RADIOLOGY:  OTHER: 	  	  LABS:	 	    CARDIAC MARKERS:                                8.4    30.5  )-----------( 191      ( 12 Oct 2017 12:40 )             23.6     10-12    140  |  111<H>  |  83<H>  ----------------------------<  80  4.7   |  14<L>  |  2.99<H>    Ca    8.7      12 Oct 2017 12:40    TPro  6.6  /  Alb  3.4  /  TBili  0.3  /  DBili  x   /  AST  22  /  ALT  35  /  AlkPhos  98  10-12    proBNP:   Lipid Profile:   HgA1c:   TSH:
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  SLE  + pain at sites on L flank and perianal area    Further ROS:  CONSTITUTIONAL:  No fever, good appetite  CARDIOVASCULAR:  No chest pain or palpitations  RESPIRATORY:  No dyspnea  GASTROINTESTINAL:  No nausea, vomiting, diarrhea, or abdominal pain  GENITOURINARY:  No dysuria  NEUROLOGIC:  No headache,     ANTIBIOTICS/RELEVANT:  antimicrobials  acyclovir IVPB 500 milliGRAM(s) IV Intermittent every 12 hours  hydroxychloroquine 200 milliGRAM(s) Oral at bedtime    immunologic:  influenza   Vaccine 0.5 milliLiter(s) IntraMuscular once    OTHER:  aluminum hydroxide/magnesium hydroxide/simethicone Suspension 30 milliLiter(s) Oral every 6 hours PRN  amLODIPine   Tablet 10 milliGRAM(s) Oral at bedtime  calcium carbonate 500 mG (Tums) Chewable 2 Tablet(s) Chew once  docusate sodium 100 milliGRAM(s) Oral three times a day  heparin  Injectable 5000 Unit(s) SubCutaneous every 8 hours  metoprolol 25 milliGRAM(s) Oral two times a day  oxyCODONE    IR 5 milliGRAM(s) Oral every 6 hours PRN  pantoprazole    Tablet 40 milliGRAM(s) Oral before breakfast  polyethylene glycol 3350 17 Gram(s) Oral daily  predniSONE   Tablet 40 milliGRAM(s) Oral daily  senna 2 Tablet(s) Oral at bedtime  sodium bicarbonate 650 milliGRAM(s) Oral three times a day  valsartan 320 milliGRAM(s) Oral daily      Objective:  Vital Signs Last 24 Hrs  T(C): 36.6 (15 Oct 2017 05:30), Max: 36.8 (14 Oct 2017 14:26)  T(F): 97.9 (15 Oct 2017 05:30), Max: 98.3 (14 Oct 2017 14:26)  HR: 79 (15 Oct 2017 05:30) (70 - 79)  BP: 146/72 (15 Oct 2017 05:30) (138/79 - 159/86)  BP(mean): --  RR: 18 (15 Oct 2017 05:30) (18 - 18)  SpO2: 98% (15 Oct 2017 05:30) (97% - 98%)    PHYSICAL EXAM:  alert  Constitutional:no acute distress  Eyes:NATASHA, EOMI  Ear/Nose/Throat: no oral lesions, 	  Respiratory: clear BL  Cardiovascular: S1S2  Gastrointestinal:soft, (+) BS, no tenderness  few patches of vesicles R flank  Extremities:no e/e/c  No Lymphadenopathy  IV sites not inflammed.    LABS:                        8.0    26.01 )-----------( 188      ( 14 Oct 2017 10:13 )             22.5     10-14    139  |  110<H>  |  77<H>  ----------------------------<  153<H>  4.9   |  13<L>  |  3.13<H>    Ca    8.9      14 Oct 2017 10:12      Imp:  VZV -cutaneous...No clear visceral disease.  No fevers.  Cr relatively stable.    Rx:  continue acyclovir.  maintain hydration--watch cr.
Patient is a 47y Female  being evaluated for [place]/ CKD 3  having rectal pain at times  upset over timing of medication / IV placement  no fever or chills         PHYSICAL EXAM:  Vitals:  T(F): 97.9 (10-15-17 @ 05:30), Max: 98.3 (10-14-17 @ 14:26)  HR: 79 (10-15-17 @ 05:30)  BP: 146/72 (10-15-17 @ 05:30)  BP(mean): --  RR: 18 (10-15-17 @ 05:30)  SpO2: 98% (10-15-17 @ 05:30)  Wt(kg): --  Constitutional: no acute distress  HEENT:  NC, ext ears nl, oropharynx clear,  nose nl  Neck: No JVD, bruit, adenopathy or thyromegaly  Eyes: PERRL, no discharge, no icterus  Respiratory: cta/p bilat, no wheezes, rales, nl effort  Cardiovascular: regular rate, S1 and S2 no rub or gallop  Abd: : BS+, soft, NT , + vesicular rash right lower abdomen   Extremities:1+ edema or cyanosis, palpable pulses  Neurological: A/O x 3, nl coordination and tone    I and O's:    10-13 @ 07:01  -  10-14 @ 07:00  --------------------------------------------------------  IN: 1400 mL / OUT: 0 mL / NET: 1400 mL    10-14 @ 07:01  -  10-15 @ 06:56  --------------------------------------------------------  IN: 1440 mL / OUT: 0 mL / NET: 1440 mL        Height (cm): 172.72 (10-12 @ 20:40)  Weight (kg): 115.2 (10-12 @ 20:40)  BMI (kg/m2): 38.6 (10-12 @ 20:40)    REVIEW OF SYSTEMS:  CONSTITUTIONAL: No weakness, fevers or chills  RESPIRATORY: No cough, wheezing, hemoptysis; No shortness of breath  CARDIOVASCULAR: No chest pain or palpitations  GI : no abd pains, nausea or vomiting  GENITOURINARY: No dysuria, frequency or hematuria  All other review of systems is negative unless indicated above.    Allergies    Imuran (Rash)    Intolerances        MEDICATIONS  (STANDING):  acyclovir IVPB 500 milliGRAM(s) IV Intermittent every 12 hours  amLODIPine   Tablet 10 milliGRAM(s) Oral at bedtime  calcium carbonate 500 mG (Tums) Chewable 2 Tablet(s) Chew once  docusate sodium 100 milliGRAM(s) Oral three times a day  heparin  Injectable 5000 Unit(s) SubCutaneous every 8 hours  hydroxychloroquine 200 milliGRAM(s) Oral at bedtime  influenza   Vaccine 0.5 milliLiter(s) IntraMuscular once  metoprolol 25 milliGRAM(s) Oral two times a day  pantoprazole    Tablet 40 milliGRAM(s) Oral before breakfast  polyethylene glycol 3350 17 Gram(s) Oral daily  predniSONE   Tablet 40 milliGRAM(s) Oral daily  senna 2 Tablet(s) Oral at bedtime  sodium bicarbonate 650 milliGRAM(s) Oral three times a day  valsartan 320 milliGRAM(s) Oral daily      LABS:  CBC Full  -  ( 14 Oct 2017 10:13 )  WBC Count : 26.01 K/uL  Hemoglobin : 8.0 g/dL  Hematocrit : 22.5 %  Platelet Count - Automated : 188 K/uL  Mean Cell Volume : 72.1 fl  Mean Cell Hemoglobin : 25.6 pg  Mean Cell Hemoglobin Concentration : 35.6 gm/dL  Auto Neutrophil # : x  Auto Lymphocyte # : x  Auto Monocyte # : x  Auto Eosinophil # : x  Auto Basophil # : x  Auto Neutrophil % : x  Auto Lymphocyte % : x  Auto Monocyte % : x  Auto Eosinophil % : x  Auto Basophil % : x    10-14    139  |  110<H>  |  77<H>  ----------------------------<  153<H>  4.9   |  13<L>  |  3.13<H>    Ca    8.9      14 Oct 2017 10:12        Urine Studies:      Urine chemistry:   Urine Na:   Urine Creatinine:   Urine Protein/Cr ratio:  Urine K:   Urine Osm:   24 Hr urine studies:       Imp:  47y Female
Patient is a 47y Female  being evaluated for lupus nephritis  sl less pain from zoster  Voiding without difficulty. No dysuria, hematuria, urgency.          PHYSICAL EXAM:  Vitals:T(C): 36.8 (10-16-17 @ 04:22), Max: 36.8 (10-15-17 @ 21:21)  HR: 80 (10-16-17 @ 05:29) (77 - 82)  BP: 140/90 (10-16-17 @ 05:29) (140/90 - 158/89)  RR: 18 (10-16-17 @ 04:22) (18 - 18)  SpO2: 100% (10-16-17 @ 04:22) (97% - 100%)  Wt(kg): --    General: no acute distress  HEENT:  NC, ext ears nl, oropharynx clear,  nose nl  Neck: No JVD, bruit, adenopathy or thyromegaly  Eyes: PERRL, no discharge, no icterus  Respiratory: cta/p bilat, no wheezes, rales, nl effort  Cardiovascular: regular rate, S1 and S2 no rub or gallop  Abd: : BS+, soft, NT , no rebound or guarding, no bruits  Extremities: tr edema, no cyanosis, palpable pulses  crusting rash R buttock        REVIEW OF SYSTEMS:  CONSTITUTIONAL: No weakness, fevers or chills  RESPIRATORY: No cough No wheezing No hemoptysis; No shortness of breath  CARDIOVASCULAR: No chest pain No palpitations  GI : no abd pain No nausea No vomiting  GENITOURINARY: No dysuria, frequency or hematuria  All other review of systems is negative unless indicated above.    Allergies    Imuran (Rash)    Intolerances          MEDICATIONS  (STANDING):  amLODIPine   Tablet 10 milliGRAM(s) Oral at bedtime  calcium carbonate 500 mG (Tums) Chewable 2 Tablet(s) Chew once  docusate sodium 100 milliGRAM(s) Oral three times a day  heparin  Injectable 5000 Unit(s) SubCutaneous every 8 hours  hydroxychloroquine 200 milliGRAM(s) Oral at bedtime  influenza   Vaccine 0.5 milliLiter(s) IntraMuscular once  metoprolol 25 milliGRAM(s) Oral two times a day  pantoprazole    Tablet 40 milliGRAM(s) Oral before breakfast  polyethylene glycol 3350 17 Gram(s) Oral daily  predniSONE   Tablet 40 milliGRAM(s) Oral daily  senna 2 Tablet(s) Oral at bedtime  sodium bicarbonate 650 milliGRAM(s) Oral three times a day  sodium chloride 0.9%. 1000 milliLiter(s) (75 mL/Hr) IV Continuous <Continuous>  valACYclovir 500 milliGRAM(s) Oral two times a day  valsartan 320 milliGRAM(s) Oral daily      Sodium,Serum 140    10-15 @ 12:08  Sodium,Serum 139    10-14 @ 10:12  Sodium,Serum 140    10-13 @ 09:51  Sodium,Serum 140    10-12 @ 12:40    Potassium,Serum 5.0    10-15 @ 12:08  Potassium,Serum 4.9    10-14 @ 10:12  Potassium,Serum 5.5    10-13 @ 09:51  Potassium,Serum 4.7    10-12 @ 12:40    Chloride,Serum 111    10-15 @ 12:08  Chloride,Serum 110    10-14 @ 10:12  Chloride,Serum 112    10-13 @ 09:51  Chloride,Serum 111    10-12 @ 12:40    CO2, Serum 17    10-15 @ 12:08  CO2, Serum 13    10-14 @ 10:12  CO2, Serum 16    10-13 @ 09:51  CO2, Serum 14    10-12 @ 12:40    BUN 77    10-15 @ 12:08  BUN 77    10-14 @ 10:12  BUN 80    10-13 @ 09:51  BUN 83    10-12 @ 12:40    Creatinine, Serum 3.32    10-15 @ 12:08  Creatinine, Serum 3.13    10-14 @ 10:12  Creatinine, Serum 3.03    10-13 @ 09:51  Creatinine, Serum 2.99    10-12 @ 12:40      10-15    140  |  111<H>  |  77<H>  ----------------------------<  92  5.0   |  17<L>  |  3.32<H>    Ca    8.5      15 Oct 2017 12:08                              8.0    29.3  )-----------( 161      ( 15 Oct 2017 13:45 )             22.2
Subjective        PHYSICAL EXAM:  Vitals:  T(F): 97.6 (10-14-17 @ 04:12), Max: 97.6 (10-14-17 @ 04:12)  HR: 75 (10-14-17 @ 04:12)  BP: 120/82 (10-14-17 @ 04:12)  BP(mean): --  RR: 18 (10-14-17 @ 04:12)  SpO2: 100% (10-14-17 @ 04:12)  Wt(kg): --  Constitutional: no acute distress  HEENT:  NC, ext ears nl, oropharynx clear,  nose nl  Neck: No JVD, bruit, adenopathy or thyromegaly  Eyes: PERRL, no discharge, no icterus  Respiratory: cta/p bilat, no wheezes, rales, nl effort  Cardiovascular: regular rate, S1 and S2 no rub or gallop  Abd: : BS+, soft, NT , no rebound or guarding, no bruits  Extremities: no edema or cyanosis, palpable pulses  Neurological: A/O x 3, nl coordination and tone    I and O's:    10-13 @ 07:01  -  10-14 @ 07:00  --------------------------------------------------------  IN: 1400 mL / OUT: 0 mL / NET: 1400 mL        Height (cm): 172.72 (10-12 @ 20:40)  Weight (kg): 115.2 (10-12 @ 20:40)  BMI (kg/m2): 38.6 (10-12 @ 20:40)    REVIEW OF SYSTEMS:  CONSTITUTIONAL: No weakness, fevers or chills  RESPIRATORY: No cough, wheezing, hemoptysis; No shortness of breath  CARDIOVASCULAR: No chest pain or palpitations  GI : no abd pains, nausea or vomiting  GENITOURINARY: No dysuria, frequency or hematuria  All other review of systems is negative unless indicated above.    Allergies    Imuran (Rash)    Intolerances        MEDICATIONS  (STANDING):  acyclovir IVPB 500 milliGRAM(s) IV Intermittent every 12 hours  amLODIPine   Tablet 10 milliGRAM(s) Oral at bedtime  calcium carbonate 500 mG (Tums) Chewable 2 Tablet(s) Chew once  docusate sodium 100 milliGRAM(s) Oral three times a day  heparin  Injectable 5000 Unit(s) SubCutaneous every 8 hours  hydroxychloroquine 200 milliGRAM(s) Oral at bedtime  influenza   Vaccine 0.5 milliLiter(s) IntraMuscular once  metoprolol 25 milliGRAM(s) Oral two times a day  pantoprazole    Tablet 40 milliGRAM(s) Oral before breakfast  polyethylene glycol 3350 17 Gram(s) Oral daily  predniSONE   Tablet 40 milliGRAM(s) Oral daily  senna 2 Tablet(s) Oral at bedtime  sodium bicarbonate 650 milliGRAM(s) Oral three times a day  valsartan 320 milliGRAM(s) Oral daily      Sodium,Serum 140    10- @ 09:51  Sodium,Serum 140    10- @ 12:40    Potassium,Serum 5.5    10 @ 09:51  Potassium,Serum 4.7    10 @ 12:40    Chloride,Serum 112    10 @ 09:51  Chloride,Serum 111    10 @ 12:40    CO2, Serum 16    10- @ 09:51  CO2, Serum 14    10-12 @ 12:40    BUN 80    10- @ 09:51  BUN 83    10-12 @ 12:40    Creatinine, Serum 3.03    10-13 @ 09:51  Creatinine, Serum 2.99    10-12 @ 12:40      10-13    140  |  112<H>  |  80<H>  ----------------------------<  102<H>  5.5<H>   |  16<L>  |  3.03<H>    Ca    8.5      13 Oct 2017 09:51    TPro  6.6  /  Alb  3.4  /  TBili  0.3  /  DBili  x   /  AST  22  /  ALT  35  /  AlkPhos  98  10-12      Urine Studies:  Urinalysis Basic - ( 12 Oct 2017 12:54 )    Color: Yellow / Appearance: SL Turbid / S.014 / pH: x  Gluc: x / Ketone: Negative  / Bili: Negative / Urobili: Negative   Blood: x / Protein: 300 mg/dL / Nitrite: Negative   Leuk Esterase: Trace / RBC: >50 /HPF / WBC >50 /HPF   Sq Epi: x / Non Sq Epi: OCC /HPF / Bacteria: Moderate /HPF        Urine chemistry:   Urine Na:   Urine Creatinine:   Urine Protein/Cr ratio:  Urine K:   Urine Osm:   24 Hr urine studies:
Subjective  Patient well known to Dr. Greco with acute on CKD secondary to lupus nephritis with DPGN S/P recent pulse steroids admitted with shingles      PHYSICAL EXAM:  Vitals:  T(F): 98.7 (10-13-17 @ 06:46), Max: 98.8 (10-12-17 @ 11:18)  HR: 75 (10-13-17 @ 06:46)  BP: 157/93 (10-13-17 @ 06:46)  BP(mean): --  RR: 18 (10-13-17 @ 06:46)  SpO2: 100% (10-13-17 @ 06:46)  Wt(kg): --  Constitutional: pain from shingles  HEENT:  NC, ext ears nl, oropharynx clear,  nose nl  Neck: No JVD, bruit, adenopathy or thyromegaly  Eyes: PERRL, no discharge, no icterus  Respiratory: cta/p bilat, no wheezes, rales, nl effort  Cardiovascular: regular rate, S1 and S2 no rub or gallop  Abd: : right flank and buttock vesicular rash  Extremities: no edema or cyanosis, palpable pulses  Neurological: A/O x 3, nl coordination and tone    I and O's:    10-12 @ 07:01  -  10-13 @ 07:00  --------------------------------------------------------  IN: 465 mL / OUT: 0 mL / NET: 465 mL        Height (cm): 172.72 (10-12 @ 20:40)  Weight (kg): 115.2 (10-12 @ 20:40)  BMI (kg/m2): 38.6 (10-12 @ 20:40)    REVIEW OF SYSTEMS:  CONSTITUTIONAL: +pain  RESPIRATORY: No cough, wheezing, hemoptysis; No shortness of breath  CARDIOVASCULAR: No chest pain or palpitations  GI : no abd pains, nausea or vomiting  GENITOURINARY: No dysuria, frequency or hematuria  All other review of systems is negative unless indicated above.    Allergies    Imuran (Rash)    Intolerances        MEDICATIONS  (STANDING):  acyclovir IVPB 500 milliGRAM(s) IV Intermittent every 12 hours  amLODIPine   Tablet 10 milliGRAM(s) Oral at bedtime  calcium carbonate 500 mG (Tums) Chewable 2 Tablet(s) Chew once  heparin  Injectable 5000 Unit(s) SubCutaneous every 8 hours  hydroxychloroquine 200 milliGRAM(s) Oral at bedtime  influenza   Vaccine 0.5 milliLiter(s) IntraMuscular once  metoprolol 25 milliGRAM(s) Oral two times a day  predniSONE   Tablet 40 milliGRAM(s) Oral daily  valsartan 320 milliGRAM(s) Oral daily      Sodium,Serum 140    10-12 @ 12:40    Potassium,Serum 4.7    10-12 @ 12:40    Chloride,Serum 111    10-12 @ 12:40    CO2, Serum 14    10- @ 12:40    BUN 83    10- @ 12:40    Creatinine, Serum 2.99    10-12 @ 12:40      10-12    140  |  111<H>  |  83<H>  ----------------------------<  80  4.7   |  14<L>  |  2.99<H>    Ca    8.7      12 Oct 2017 12:40    TPro  6.6  /  Alb  3.4  /  TBili  0.3  /  DBili  x   /  AST  22  /  ALT  35  /  AlkPhos  98  10-12      Urine Studies:  Urinalysis Basic - ( 12 Oct 2017 12:54 )    Color: Yellow / Appearance: SL Turbid / S.014 / pH: x  Gluc: x / Ketone: Negative  / Bili: Negative / Urobili: Negative   Blood: x / Protein: 300 mg/dL / Nitrite: Negative   Leuk Esterase: Trace / RBC: >50 /HPF / WBC >50 /HPF   Sq Epi: x / Non Sq Epi: OCC /HPF / Bacteria: Moderate /HPF        Urine chemistry:   Urine Na:   Urine Creatinine:   Urine Protein/Cr ratio:  Urine K:   Urine Osm:   24 Hr urine studies:
infectious diseases progress note:    Patient is a 47y old  Female who presents with a chief complaint of rash (12 Oct 2017 16:10)        Herpes zoster with other complication        ROS:  CONSTITUTIONAL:  Negative fever or chills, feels well, good appetite  EYES:  Negative  blurry vision or double vision  CARDIOVASCULAR:  Negative for chest pain or palpitations  RESPIRATORY:  Negative for cough, wheezing, or SOB   GASTROINTESTINAL:  Negative for nausea, vomiting, diarrhea, constipation, or abdominal pain  GENITOURINARY:  Negative frequency, urgency or dysuria  NEUROLOGIC:  No headache, confusion, dizziness, lightheadedness    Allergies    Imuran (Rash)    Intolerances        ANTIBIOTICS/RELEVANT:  antimicrobials  acyclovir IVPB 500 milliGRAM(s) IV Intermittent every 12 hours  hydroxychloroquine 200 milliGRAM(s) Oral at bedtime    immunologic:  influenza   Vaccine 0.5 milliLiter(s) IntraMuscular once    OTHER:  acetaminophen   Tablet. 650 milliGRAM(s) Oral every 6 hours PRN  aluminum hydroxide/magnesium hydroxide/simethicone Suspension 30 milliLiter(s) Oral every 6 hours PRN  amLODIPine   Tablet 10 milliGRAM(s) Oral at bedtime  calcium carbonate 500 mG (Tums) Chewable 2 Tablet(s) Chew once  docusate sodium 100 milliGRAM(s) Oral three times a day  heparin  Injectable 5000 Unit(s) SubCutaneous every 8 hours  metoprolol 25 milliGRAM(s) Oral two times a day  oxyCODONE    IR 5 milliGRAM(s) Oral every 6 hours PRN  pantoprazole    Tablet 40 milliGRAM(s) Oral before breakfast  polyethylene glycol 3350 17 Gram(s) Oral daily  predniSONE   Tablet 40 milliGRAM(s) Oral daily  senna 2 Tablet(s) Oral at bedtime  sodium bicarbonate 650 milliGRAM(s) Oral three times a day  sodium chloride 0.9%. 1000 milliLiter(s) IV Continuous <Continuous>  valsartan 320 milliGRAM(s) Oral daily      Objective:  Vital Signs Last 24 Hrs  T(C): 36.8 (16 Oct 2017 04:22), Max: 36.8 (15 Oct 2017 21:21)  T(F): 98.2 (16 Oct 2017 04:22), Max: 98.2 (15 Oct 2017 21:21)  HR: 80 (16 Oct 2017 05:29) (77 - 82)  BP: 140/90 (16 Oct 2017 05:29) (140/90 - 158/89)  BP(mean): --  RR: 18 (16 Oct 2017 04:22) (18 - 18)  SpO2: 100% (16 Oct 2017 04:22) (97% - 100%)    PHYSICAL EXAM:  Constitutional:Well-developed, well nourished--no acute distress  Eyes:NATASHA, EOMI  Ear/Nose/Throat: no oral lesion, no sinus tenderness on percussion	  Neck:no JVD, no lymphadenopathy, supple  Respiratory: CTA cristina  Cardiovascular: S1S2 RRR, no murmurs  Gastrointestinal:soft, (+) BS, no HSM  Extremities:no 90 pecent crusted         LABS:                        8.0    29.3  )-----------( 161      ( 15 Oct 2017 13:45 )             22.2     10-15    140  |  111<H>  |  77<H>  ----------------------------<  92  5.0   |  17<L>  |  3.32<H>    Ca    8.5      15 Oct 2017 12:08              MICROBIOLOGY:    RECENT CULTURES:  10-12 @ 16:30 .Urine Clean Catch (Midstream)   TODD      Enterococcus faecalis  Enterococcus faecalis     10,000 - 49,000 CFU/mL Enterococcus faecalis  Normal Urogenital tasia present    10-12 @ 14:58 .Blood Blood-Venous                No growth to date.          RESPIRATORY CULTURES:              RADIOLOGY & ADDITIONAL STUDIES:        Pager [phone]  After 5 pm/weekends or if no response :[phone]
infectious diseases progress note:    Patient is a 47y old  Female who presents with a chief complaint of rash (12 Oct 2017 16:10)        Herpes zoster with other complication        ROS:  CONSTITUTIONAL:  Negative fever or chills, feels well, good appetite  EYES:  Negative  blurry vision or double vision  CARDIOVASCULAR:  Negative for chest pain or palpitations  RESPIRATORY:  Negative for cough, wheezing, or SOB   GASTROINTESTINAL:  Negative for nausea, vomiting, diarrhea, constipation, or abdominal pain  GENITOURINARY:  Negative frequency, urgency or dysuria  NEUROLOGIC:  No headache, confusion, dizziness, lightheadedness    Allergies    Imuran (Rash)    Intolerances        ANTIBIOTICS/RELEVANT:  antimicrobials  acyclovir IVPB 500 milliGRAM(s) IV Intermittent every 12 hours  hydroxychloroquine 200 milliGRAM(s) Oral at bedtime    immunologic:  influenza   Vaccine 0.5 milliLiter(s) IntraMuscular once    OTHER:  amLODIPine   Tablet 10 milliGRAM(s) Oral at bedtime  calcium carbonate 500 mG (Tums) Chewable 2 Tablet(s) Chew once  heparin  Injectable 5000 Unit(s) SubCutaneous every 8 hours  metoprolol 25 milliGRAM(s) Oral two times a day  oxyCODONE    IR 5 milliGRAM(s) Oral every 6 hours PRN  predniSONE   Tablet 40 milliGRAM(s) Oral daily  valsartan 320 milliGRAM(s) Oral daily      Objective:  Vital Signs Last 24 Hrs  T(C): 37.1 (13 Oct 2017 06:46), Max: 37.1 (12 Oct 2017 11:18)  T(F): 98.7 (13 Oct 2017 06:46), Max: 98.8 (12 Oct 2017 11:18)  HR: 75 (13 Oct 2017 06:46) (71 - 83)  BP: 157/93 (13 Oct 2017 06:46) (134/81 - 160/98)  BP(mean): --  RR: 18 (13 Oct 2017 06:46) (16 - 18)  SpO2: 100% (13 Oct 2017 06:46) (96% - 100%)    PHYSICAL EXAM:  Constitutional:Well-developed, well nourished--no acute distress  Eyes:NATASHA, EOMI  Ear/Nose/Throat: no oral lesion, no sinus tenderness on percussion	  Extremities:no e/e/c  no cahnge in zoster of lateral abd and buttocks       LABS:                        8.4    30.5  )-----------( 191      ( 12 Oct 2017 12:40 )             23.6     10-12    140  |  111<H>  |  83<H>  ----------------------------<  80  4.7   |  14<L>  |  2.99<H>    Ca    8.7      12 Oct 2017 12:40    TPro  6.6  /  Alb  3.4  /  TBili  0.3  /  DBili  x   /  AST  22  /  ALT  35  /  AlkPhos  98  10-12    PT/INR - ( 12 Oct 2017 12:40 )   PT: 10.2 sec;   INR: 0.94 ratio         PTT - ( 12 Oct 2017 12:40 )  PTT:20.7 sec  Urinalysis Basic - ( 12 Oct 2017 12:54 )    Color: Yellow / Appearance: SL Turbid / S.014 / pH: x  Gluc: x / Ketone: Negative  / Bili: Negative / Urobili: Negative   Blood: x / Protein: 300 mg/dL / Nitrite: Negative   Leuk Esterase: Trace / RBC: >50 /HPF / WBC >50 /HPF   Sq Epi: x / Non Sq Epi: OCC /HPF / Bacteria: Moderate /HPF          MICROBIOLOGY:    RECENT CULTURES:        RESPIRATORY CULTURES:              RADIOLOGY & ADDITIONAL STUDIES:        Pager [phone]  After 5 pm/weekends or if no response :[phone]

## 2017-10-16 NOTE — DISCHARGE NOTE ADULT - MEDICATION SUMMARY - MEDICATIONS TO TAKE
I will START or STAY ON the medications listed below when I get home from the hospital:    predniSONE 20 mg oral tablet  -- 2 tab(s) by mouth once a day  -- Indication: For Systemic lupus erythematosus with other organ involvement    Tylenol 325 mg oral tablet  -- 2 tab(s) by mouth every 4 hours, As Needed for mild to moderate pain  -- Indication: For pain    oxyCODONE 5 mg oral tablet  -- 1 tab(s) by mouth every 6 hours, As needed for severe pain MDD:4  -- Indication: For pain    valsartan 320 mg oral tablet  -- 1 tab(s) by mouth once a day (at bedtime)  -- Indication: For High BP    sodium bicarbonate 650 mg oral tablet  -- 1 tab(s) by mouth 3 times a day  -- Indication: For Lupus nephritis    Plaquenil 200 mg oral tablet  -- 2 tab(s) by mouth once a day (at bedtime) takes 2 tablets nightly  -- Indication: For Systemic lupus erythematosus with other organ involvement    valACYclovir 500 mg oral tablet  -- 1 tab(s) by mouth 2 times a day x5 days  -- Indication: For Herpes zoster with other complication    Bystolic 5 mg oral tablet  -- 1 tab(s) by mouth once a day (at bedtime)  -- Indication: For High BP    amLODIPine 10 mg oral tablet  -- 1 tab(s) by mouth once a day (at bedtime)  -- Indication: For High BP    Pepcid 40 mg oral tablet  -- 1 tab(s) by mouth once a day (at bedtime)  -- Indication: For GI prophylaxis; take while taking steroids or as directed by your PCP    polyethylene glycol 3350 oral powder for reconstitution  -- 17 gram(s) by mouth once a day  -- Indication: For Constipation caused by narcotics    Vitamin D3 2000 intl units oral tablet  -- 1 tab(s) by mouth once a day  -- Indication: For Supplement

## 2017-10-16 NOTE — PROGRESS NOTE ADULT - ASSESSMENT
46 yo Female w/SLE, lupus nephritis, Herpes Zoster:  1. Zoster - change to po valtrex as per id   analgesics prn   Renal,f/u noted/creatinine stable    isolation until lesions crusted  2. SLE, lupus nephritis - c/w 40mg Prednisone daily, Cellcept held per Rheum  inc wbc likely from iv steroids last week  f/u cbc as outpt this week  discussed w/ pmd  3. DVT prophylaxis  id f.u appreciated   cont htn med  d/c home      base

## 2017-10-16 NOTE — DISCHARGE NOTE ADULT - HOSPITAL COURSE
48 yo F, with PMH SLE, Lupus nephritis, treated with CellCept and IV steroids as outpt recently for worsening renal function. Presented to ED for evaluation of rash that appeared on her right side and buttock. 48 yo F, with PMH SLE, Lupus nephritis, treated with CellCept and IV steroids as outpt recently for worsening renal function. Presented to ED for evaluation of rash that appeared on her right side and buttock, and continued lupus nephritis. Followed by ID and Nephrology. Treated with IV acyclovir. Persistent leukocytosis most likely related to steroids as no fevers during hospitalization. Patient will f/u with Nephrology & PCP as out patient . Stable for discharge today.

## 2017-10-16 NOTE — PROGRESS NOTE ADULT - ASSESSMENT
lesions are 80 90 percenjt crsted  no new loesions    can dc acyclovir and start po valcryx  I am still unclear why wbc is so high, it is not explained by vzv ?? to complete 5 more days of po therapy

## 2017-10-16 NOTE — DISCHARGE NOTE ADULT - MEDICATION SUMMARY - MEDICATIONS TO STOP TAKING
I will STOP taking the medications listed below when I get home from the hospital:    CellCept 500 mg oral tablet  -- 4 tab(s) by mouth once a day takes all tabs at once at dinner

## 2017-10-16 NOTE — DISCHARGE NOTE ADULT - CARE PLAN
Principal Discharge DX:	Herpes zoster with other complication  Goal:	Resolves  Instructions for follow-up, activity and diet:	Continue with valtrex for another 5 days as prescribed.  Follow up with your primary healthcare provider in 1 week.  Call for appointment.  Continue with pain medications as needed.  Lesions are not considered contagious once crusted but others should use caution if coming into contact with lesions or if they never had chicken pox.  Vigilant hand washing decreases chances of spreading.  Secondary Diagnosis:	Lupus nephritis  Instructions for follow-up, activity and diet:	Continue with medications as prescribed.  Follow up with Dr Greco in 1 week. Call for appointment.  Avoid taking (NSAIDs) - (ex: Ibuprofen, Advil, Celebrex, Naprosyn)  Avoid taking any nephrotoxic agents (can harm kidneys) - Intravenous contrast for diagnostic testing, combination cold medications.  Have all medications adjusted for your renal function by your Health Care Provider.  Blood pressure control is important.  Take all medication as prescribed.  Secondary Diagnosis:	Systemic lupus erythematosus with other organ involvement  Instructions for follow-up, activity and diet:	continue with medications as prescribed and follow up with your primary healthcare provider in 1 week.  Secondary Diagnosis:	Essential hypertension  Instructions for follow-up, activity and diet:	Low salt diet  Activity as tolerated.  Take all medication as prescribed.  Follow up with your medical doctor for routine blood pressure monitoring at your next visit.  Notify your doctor if you have any of the following symptoms:   Dizziness, Lightheadedness, Blurry vision, Headache, Chest pain, Shortness of breath Principal Discharge DX:	Herpes zoster with other complication  Goal:	Resolves  Instructions for follow-up, activity and diet:	Continue with valtrex for another 5 days as prescribed.  Follow up with your primary healthcare provider in 1 week.  Call for appointment.  Continue with pain medications as needed.  Lesions are not considered contagious once crusted but others should use caution if coming into contact with lesions or if they never had chicken pox.  Vigilant hand washing decreases chances of spreading.  Secondary Diagnosis:	Lupus nephritis  Instructions for follow-up, activity and diet:	Continue with medications as prescribed.  Follow up with Dr Greco in 1 week. Call for appointment.  Avoid taking (NSAIDs) - (ex: Ibuprofen, Advil, Celebrex, Naprosyn)  Avoid taking any nephrotoxic agents (can harm kidneys) - Intravenous contrast for diagnostic testing, combination cold medications.  Have all medications adjusted for your renal function by your Health Care Provider.  Blood pressure control is important.  Take all medication as prescribed.  Secondary Diagnosis:	Systemic lupus erythematosus with other organ involvement  Instructions for follow-up, activity and diet:	Cell cept is on hold due to your kidney issues; please discuss with your Rheumatologist.  Continue with medications as prescribed and follow up with your primary healthcare provider in 1 week.  Secondary Diagnosis:	Essential hypertension  Instructions for follow-up, activity and diet:	Low salt diet  Activity as tolerated.  Take all medication as prescribed.  Follow up with your medical doctor for routine blood pressure monitoring at your next visit.  Notify your doctor if you have any of the following symptoms:   Dizziness, Lightheadedness, Blurry vision, Headache, Chest pain, Shortness of breath

## 2017-10-16 NOTE — DISCHARGE NOTE ADULT - PATIENT PORTAL LINK FT
“You can access the [facility] Patient Portal, offered by [facility], by registering with the following website: [email or web address]”

## 2017-10-16 NOTE — DISCHARGE NOTE ADULT - PLAN OF CARE
Resolves Continue with valtrex for another 5 days as prescribed.  Follow up with your primary healthcare provider in 1 week.  Call for appointment.  Continue with pain medications as needed.  Lesions are not considered contagious once crusted but others should use caution if coming into contact with lesions or if they never had chicken pox.  Vigilant hand washing decreases chances of spreading. Continue with medications as prescribed.  Follow up with Dr Greco in 1 week. Call for appointment.  Avoid taking (NSAIDs) - (ex: Ibuprofen, Advil, Celebrex, Naprosyn)  Avoid taking any nephrotoxic agents (can harm kidneys) - Intravenous contrast for diagnostic testing, combination cold medications.  Have all medications adjusted for your renal function by your Health Care Provider.  Blood pressure control is important.  Take all medication as prescribed. continue with medications as prescribed and follow up with your primary healthcare provider in 1 week. Low salt diet  Activity as tolerated.  Take all medication as prescribed.  Follow up with your medical doctor for routine blood pressure monitoring at your next visit.  Notify your doctor if you have any of the following symptoms:   Dizziness, Lightheadedness, Blurry vision, Headache, Chest pain, Shortness of breath Continue with medications as prescribed and follow up with your primary healthcare provider in 1 week. Cell cept is on hold due to your kidney issues; please discuss with your Rheumatologist.  Continue with medications as prescribed and follow up with your primary healthcare provider in 1 week.

## 2017-10-16 NOTE — DISCHARGE NOTE ADULT - ADDITIONAL INSTRUCTIONS
Follow up with Nephrology, and your primary healthcare provider(s) as instructed above. Follow up with Nephrology, Rheumatology, and your primary healthcare provider(s) as instructed above.

## 2017-10-16 NOTE — PROGRESS NOTE ADULT - PROVIDER SPECIALTY LIST ADULT
Infectious Disease
Internal Medicine
Nephrology
Internal Medicine

## 2017-10-17 LAB
CULTURE RESULTS: SIGNIFICANT CHANGE UP
CULTURE RESULTS: SIGNIFICANT CHANGE UP
SPECIMEN SOURCE: SIGNIFICANT CHANGE UP
SPECIMEN SOURCE: SIGNIFICANT CHANGE UP

## 2017-11-10 ENCOUNTER — APPOINTMENT (OUTPATIENT)
Dept: RHEUMATOLOGY | Facility: CLINIC | Age: 47
End: 2017-11-10
Payer: COMMERCIAL

## 2017-11-10 ENCOUNTER — LABORATORY RESULT (OUTPATIENT)
Age: 47
End: 2017-11-10

## 2017-11-10 VITALS
OXYGEN SATURATION: 99 % | SYSTOLIC BLOOD PRESSURE: 131 MMHG | WEIGHT: 227 LBS | TEMPERATURE: 97.8 F | BODY MASS INDEX: 36.48 KG/M2 | HEIGHT: 66 IN | DIASTOLIC BLOOD PRESSURE: 90 MMHG | HEART RATE: 67 BPM

## 2017-11-10 PROCEDURE — 99215 OFFICE O/P EST HI 40 MIN: CPT

## 2017-11-11 ENCOUNTER — EMERGENCY (EMERGENCY)
Facility: HOSPITAL | Age: 47
LOS: 1 days | Discharge: HOME | End: 2017-11-11
Attending: EMERGENCY MEDICINE | Admitting: EMERGENCY MEDICINE
Payer: COMMERCIAL

## 2017-11-11 VITALS
HEART RATE: 74 BPM | TEMPERATURE: 98 F | SYSTOLIC BLOOD PRESSURE: 161 MMHG | OXYGEN SATURATION: 100 % | RESPIRATION RATE: 16 BRPM | DIASTOLIC BLOOD PRESSURE: 97 MMHG

## 2017-11-11 VITALS
SYSTOLIC BLOOD PRESSURE: 141 MMHG | OXYGEN SATURATION: 100 % | DIASTOLIC BLOOD PRESSURE: 89 MMHG | RESPIRATION RATE: 20 BRPM | TEMPERATURE: 98 F | HEART RATE: 78 BPM

## 2017-11-11 DIAGNOSIS — D36.9 BENIGN NEOPLASM, UNSPECIFIED SITE: Chronic | ICD-10-CM

## 2017-11-11 LAB
ALBUMIN SERPL ELPH-MCNC: 3.6 G/DL
ALBUMIN SERPL ELPH-MCNC: 4 G/DL — SIGNIFICANT CHANGE UP (ref 3.3–5)
ALP BLD-CCNC: 130 U/L
ALP SERPL-CCNC: 132 U/L — HIGH (ref 40–120)
ALT FLD-CCNC: 81 U/L RC — HIGH (ref 10–45)
ALT SERPL-CCNC: 82 U/L
ANION GAP SERPL CALC-SCNC: 12 MMOL/L — SIGNIFICANT CHANGE UP (ref 5–17)
ANION GAP SERPL CALC-SCNC: 14 MMOL/L — SIGNIFICANT CHANGE UP (ref 5–17)
ANION GAP SERPL CALC-SCNC: 14 MMOL/L — SIGNIFICANT CHANGE UP (ref 5–17)
ANION GAP SERPL CALC-SCNC: 17 MMOL/L
ANISOCYTOSIS BLD QL: SIGNIFICANT CHANGE UP
APPEARANCE: CLEAR
AST SERPL-CCNC: 23 U/L
AST SERPL-CCNC: 34 U/L — SIGNIFICANT CHANGE UP (ref 10–40)
BACTERIA: ABNORMAL
BASE EXCESS BLDV CALC-SCNC: -9 MMOL/L — LOW (ref -2–2)
BASOPHILS # BLD AUTO: 0 K/UL
BASOPHILS # BLD AUTO: 0 K/UL — SIGNIFICANT CHANGE UP (ref 0–0.2)
BASOPHILS NFR BLD AUTO: 0 %
BASOPHILS NFR BLD AUTO: 0.1 % — SIGNIFICANT CHANGE UP (ref 0–2)
BILIRUB SERPL-MCNC: 0.2 MG/DL
BILIRUB SERPL-MCNC: 0.4 MG/DL — SIGNIFICANT CHANGE UP (ref 0.2–1.2)
BILIRUBIN URINE: NEGATIVE
BLOOD URINE: ABNORMAL
BUN SERPL-MCNC: 56 MG/DL — HIGH (ref 7–23)
BUN SERPL-MCNC: 57 MG/DL — HIGH (ref 7–23)
BUN SERPL-MCNC: 57 MG/DL — HIGH (ref 7–23)
BUN SERPL-MCNC: 63 MG/DL
C3 SERPL-MCNC: 53 MG/DL
C4 SERPL-MCNC: 8 MG/DL
CA-I SERPL-SCNC: 1.32 MMOL/L — HIGH (ref 1.12–1.3)
CALCIUM SERPL-MCNC: 9 MG/DL — SIGNIFICANT CHANGE UP (ref 8.4–10.5)
CALCIUM SERPL-MCNC: 9.3 MG/DL
CALCIUM SERPL-MCNC: 9.5 MG/DL — SIGNIFICANT CHANGE UP (ref 8.4–10.5)
CALCIUM SERPL-MCNC: 9.6 MG/DL — SIGNIFICANT CHANGE UP (ref 8.4–10.5)
CHLORIDE BLDV-SCNC: 117 MMOL/L — HIGH (ref 96–108)
CHLORIDE SERPL-SCNC: 108 MMOL/L — SIGNIFICANT CHANGE UP (ref 96–108)
CHLORIDE SERPL-SCNC: 109 MMOL/L
CK SERPL-CCNC: 25 U/L
CO2 BLDV-SCNC: 18 MMOL/L — LOW (ref 22–30)
CO2 SERPL-SCNC: 13 MMOL/L
CO2 SERPL-SCNC: 16 MMOL/L — LOW (ref 22–31)
CO2 SERPL-SCNC: 16 MMOL/L — LOW (ref 22–31)
CO2 SERPL-SCNC: 17 MMOL/L — LOW (ref 22–31)
COLOR: YELLOW
CREAT SERPL-MCNC: 2.34 MG/DL — HIGH (ref 0.5–1.3)
CREAT SERPL-MCNC: 2.35 MG/DL — HIGH (ref 0.5–1.3)
CREAT SERPL-MCNC: 2.37 MG/DL — HIGH (ref 0.5–1.3)
CREAT SERPL-MCNC: 2.7 MG/DL
CREAT SPEC-SCNC: 94 MG/DL
CREAT/PROT UR: 1.4 RATIO
DACRYOCYTES BLD QL SMEAR: SLIGHT — SIGNIFICANT CHANGE UP
EOSINOPHIL # BLD AUTO: 0 K/UL
EOSINOPHIL # BLD AUTO: 0.1 K/UL — SIGNIFICANT CHANGE UP (ref 0–0.5)
EOSINOPHIL NFR BLD AUTO: 0 %
EOSINOPHIL NFR BLD AUTO: 0.2 % — SIGNIFICANT CHANGE UP (ref 0–6)
GAS PNL BLDV: 137 MMOL/L — SIGNIFICANT CHANGE UP (ref 136–145)
GAS PNL BLDV: SIGNIFICANT CHANGE UP
GAS PNL BLDV: SIGNIFICANT CHANGE UP
GLUCOSE BLDV-MCNC: 67 MG/DL — LOW (ref 70–99)
GLUCOSE QUALITATIVE U: NEGATIVE MG/DL
GLUCOSE SERPL-MCNC: 75 MG/DL — SIGNIFICANT CHANGE UP (ref 70–99)
GLUCOSE SERPL-MCNC: 76 MG/DL — SIGNIFICANT CHANGE UP (ref 70–99)
GLUCOSE SERPL-MCNC: 76 MG/DL — SIGNIFICANT CHANGE UP (ref 70–99)
GLUCOSE SERPL-MCNC: 79 MG/DL
HCO3 BLDV-SCNC: 17 MMOL/L — LOW (ref 21–29)
HCT VFR BLD CALC: 30.7 % — LOW (ref 34.5–45)
HCT VFR BLD CALC: 31 %
HCT VFR BLDA CALC: 29 % — LOW (ref 39–50)
HGB BLD CALC-MCNC: 9.5 G/DL — LOW (ref 11.5–15.5)
HGB BLD-MCNC: 10.4 G/DL — LOW (ref 11.5–15.5)
HGB BLD-MCNC: 10.5 G/DL
HOROWITZ INDEX BLDV+IHG-RTO: SIGNIFICANT CHANGE UP
HYALINE CASTS: 4 /LPF
HYPOCHROMIA BLD QL: SLIGHT — SIGNIFICANT CHANGE UP
KETONES URINE: NEGATIVE
LACTATE BLDV-MCNC: 1 MMOL/L — SIGNIFICANT CHANGE UP (ref 0.7–2)
LEUKOCYTE ESTERASE URINE: ABNORMAL
LYMPHOCYTES # BLD AUTO: 20 % — SIGNIFICANT CHANGE UP (ref 13–44)
LYMPHOCYTES # BLD AUTO: 3.5 K/UL — HIGH (ref 1–3.3)
LYMPHOCYTES # BLD AUTO: 5.99 K/UL
LYMPHOCYTES NFR BLD AUTO: 23.3 %
MAN DIFF?: NORMAL
MCHC RBC-ENTMCNC: 26.4 PG
MCHC RBC-ENTMCNC: 27.5 PG — SIGNIFICANT CHANGE UP (ref 27–34)
MCHC RBC-ENTMCNC: 33.9 GM/DL
MCHC RBC-ENTMCNC: 33.9 GM/DL — SIGNIFICANT CHANGE UP (ref 32–36)
MCV RBC AUTO: 78.1 FL
MCV RBC AUTO: 80.9 FL — SIGNIFICANT CHANGE UP (ref 80–100)
MICROSCOPIC-UA: NORMAL
MONOCYTES # BLD AUTO: 1.29 K/UL
MONOCYTES # BLD AUTO: 1.3 K/UL — HIGH (ref 0–0.9)
MONOCYTES NFR BLD AUTO: 5 %
MONOCYTES NFR BLD AUTO: 7 % — SIGNIFICANT CHANGE UP (ref 2–14)
NEUTROPHILS # BLD AUTO: 18.23 K/UL
NEUTROPHILS # BLD AUTO: 19.8 K/UL — HIGH (ref 1.8–7.4)
NEUTROPHILS NFR BLD AUTO: 70.9 %
NEUTROPHILS NFR BLD AUTO: 71 % — SIGNIFICANT CHANGE UP (ref 43–77)
NITRITE URINE: NEGATIVE
PCO2 BLDV: 40 MMHG — SIGNIFICANT CHANGE UP (ref 35–50)
PH BLDV: 7.25 — LOW (ref 7.35–7.45)
PH URINE: 5
PLAT MORPH BLD: ABNORMAL
PLATELET # BLD AUTO: 171 K/UL
PLATELET # BLD AUTO: SIGNIFICANT CHANGE UP K/UL (ref 150–400)
PO2 BLDV: 23 MMHG — LOW (ref 25–45)
POIKILOCYTOSIS BLD QL AUTO: SLIGHT — SIGNIFICANT CHANGE UP
POTASSIUM BLDV-SCNC: 5.4 MMOL/L — HIGH (ref 3.5–5)
POTASSIUM SERPL-MCNC: 5.5 MMOL/L — HIGH (ref 3.5–5.3)
POTASSIUM SERPL-MCNC: 6.1 MMOL/L — HIGH (ref 3.5–5.3)
POTASSIUM SERPL-MCNC: 6.9 MMOL/L — CRITICAL HIGH (ref 3.5–5.3)
POTASSIUM SERPL-MCNC: 7.2 MMOL/L — CRITICAL HIGH (ref 3.5–5.3)
POTASSIUM SERPL-SCNC: 5.5 MMOL/L — HIGH (ref 3.5–5.3)
POTASSIUM SERPL-SCNC: 6.1 MMOL/L — HIGH (ref 3.5–5.3)
POTASSIUM SERPL-SCNC: 6.8 MMOL/L
POTASSIUM SERPL-SCNC: 6.9 MMOL/L — CRITICAL HIGH (ref 3.5–5.3)
POTASSIUM SERPL-SCNC: 7.2 MMOL/L — CRITICAL HIGH (ref 3.5–5.3)
PROT SERPL-MCNC: 7 G/DL
PROT SERPL-MCNC: 7.3 G/DL — SIGNIFICANT CHANGE UP (ref 6–8.3)
PROT UR-MCNC: 132 MG/DL
PROTEIN URINE: 100 MG/DL
RBC # BLD: 3.8 M/UL — SIGNIFICANT CHANGE UP (ref 3.8–5.2)
RBC # BLD: 3.97 M/UL
RBC # FLD: 19.7 % — HIGH (ref 10.3–14.5)
RBC # FLD: 20.4 %
RBC BLD AUTO: ABNORMAL
RED BLOOD CELLS URINE: 7 /HPF
SAO2 % BLDV: 29 % — LOW (ref 67–88)
SCHISTOCYTES BLD QL AUTO: SLIGHT — SIGNIFICANT CHANGE UP
SODIUM SERPL-SCNC: 137 MMOL/L — SIGNIFICANT CHANGE UP (ref 135–145)
SODIUM SERPL-SCNC: 138 MMOL/L — SIGNIFICANT CHANGE UP (ref 135–145)
SODIUM SERPL-SCNC: 138 MMOL/L — SIGNIFICANT CHANGE UP (ref 135–145)
SODIUM SERPL-SCNC: 139 MMOL/L
SPECIFIC GRAVITY URINE: 1.02
SPHEROCYTES BLD QL SMEAR: SLIGHT — SIGNIFICANT CHANGE UP
SQUAMOUS EPITHELIAL CELLS: 8 /HPF
TARGETS BLD QL SMEAR: SIGNIFICANT CHANGE UP
UROBILINOGEN URINE: NEGATIVE MG/DL
VARIANT LYMPHS # BLD: 2 % — SIGNIFICANT CHANGE UP (ref 0–6)
WBC # BLD: 24.7 K/UL — HIGH (ref 3.8–10.5)
WBC # FLD AUTO: 24.7 K/UL — HIGH (ref 3.8–10.5)
WBC # FLD AUTO: 25.71 K/UL
WHITE BLOOD CELLS URINE: 17 /HPF

## 2017-11-11 PROCEDURE — 82947 ASSAY GLUCOSE BLOOD QUANT: CPT

## 2017-11-11 PROCEDURE — 82435 ASSAY OF BLOOD CHLORIDE: CPT

## 2017-11-11 PROCEDURE — 76937 US GUIDE VASCULAR ACCESS: CPT | Mod: 26

## 2017-11-11 PROCEDURE — 84132 ASSAY OF SERUM POTASSIUM: CPT

## 2017-11-11 PROCEDURE — 83605 ASSAY OF LACTIC ACID: CPT

## 2017-11-11 PROCEDURE — 82803 BLOOD GASES ANY COMBINATION: CPT

## 2017-11-11 PROCEDURE — 96374 THER/PROPH/DIAG INJ IV PUSH: CPT

## 2017-11-11 PROCEDURE — 80053 COMPREHEN METABOLIC PANEL: CPT

## 2017-11-11 PROCEDURE — 85027 COMPLETE CBC AUTOMATED: CPT

## 2017-11-11 PROCEDURE — 93005 ELECTROCARDIOGRAM TRACING: CPT | Mod: 76

## 2017-11-11 PROCEDURE — 99284 EMERGENCY DEPT VISIT MOD MDM: CPT | Mod: 25

## 2017-11-11 PROCEDURE — 99285 EMERGENCY DEPT VISIT HI MDM: CPT | Mod: 25

## 2017-11-11 PROCEDURE — 76937 US GUIDE VASCULAR ACCESS: CPT

## 2017-11-11 PROCEDURE — 85014 HEMATOCRIT: CPT

## 2017-11-11 PROCEDURE — 82330 ASSAY OF CALCIUM: CPT

## 2017-11-11 PROCEDURE — 96375 TX/PRO/DX INJ NEW DRUG ADDON: CPT

## 2017-11-11 PROCEDURE — 80048 BASIC METABOLIC PNL TOTAL CA: CPT

## 2017-11-11 PROCEDURE — 36000 PLACE NEEDLE IN VEIN: CPT | Mod: RT,XU

## 2017-11-11 PROCEDURE — 93010 ELECTROCARDIOGRAM REPORT: CPT

## 2017-11-11 PROCEDURE — 93010 ELECTROCARDIOGRAM REPORT: CPT | Mod: 77

## 2017-11-11 PROCEDURE — 84295 ASSAY OF SERUM SODIUM: CPT

## 2017-11-11 RX ORDER — CALCIUM GLUCONATE 100 MG/ML
1 VIAL (ML) INTRAVENOUS ONCE
Qty: 0 | Refills: 0 | Status: COMPLETED | OUTPATIENT
Start: 2017-11-11 | End: 2017-11-11

## 2017-11-11 RX ORDER — INSULIN HUMAN 100 [IU]/ML
10 INJECTION, SOLUTION SUBCUTANEOUS ONCE
Qty: 0 | Refills: 0 | Status: COMPLETED | OUTPATIENT
Start: 2017-11-11 | End: 2017-11-11

## 2017-11-11 RX ORDER — SODIUM CHLORIDE 9 MG/ML
1000 INJECTION INTRAMUSCULAR; INTRAVENOUS; SUBCUTANEOUS ONCE
Qty: 0 | Refills: 0 | Status: COMPLETED | OUTPATIENT
Start: 2017-11-11 | End: 2017-11-11

## 2017-11-11 RX ORDER — DEXTROSE 50 % IN WATER 50 %
50 SYRINGE (ML) INTRAVENOUS ONCE
Qty: 0 | Refills: 0 | Status: COMPLETED | OUTPATIENT
Start: 2017-11-11 | End: 2017-11-11

## 2017-11-11 RX ORDER — SODIUM POLYSTYRENE SULFONATE 4.1 MEQ/G
15 POWDER, FOR SUSPENSION ORAL ONCE
Qty: 0 | Refills: 0 | Status: COMPLETED | OUTPATIENT
Start: 2017-11-11 | End: 2017-11-11

## 2017-11-11 RX ADMIN — SODIUM CHLORIDE 2000 MILLILITER(S): 9 INJECTION INTRAMUSCULAR; INTRAVENOUS; SUBCUTANEOUS at 14:03

## 2017-11-11 RX ADMIN — Medication 200 GRAM(S): at 15:19

## 2017-11-11 RX ADMIN — INSULIN HUMAN 10 UNIT(S): 100 INJECTION, SOLUTION SUBCUTANEOUS at 15:12

## 2017-11-11 RX ADMIN — SODIUM POLYSTYRENE SULFONATE 15 GRAM(S): 4.1 POWDER, FOR SUSPENSION ORAL at 15:13

## 2017-11-11 RX ADMIN — Medication 50 MILLILITER(S): at 15:12

## 2017-11-11 NOTE — ED PROVIDER NOTE - ST/T WAVE
no st elevation, no st depression no st elevation, no st depression, no peaked T waves no st elevation, no st depression, no peaked t waves

## 2017-11-11 NOTE — ED PROVIDER NOTE - PLAN OF CARE
1. Follow up with Dr. Greco within 2-3 days of discharge.   2. Continue all at home medications except hold Valsartan until follow up. Ensure daily Furosemide until follow up   3. Return to ED for change of symptoms including chest pain, shortness of breath, dizziness, abdominal pain, nausea, vomiting and any other symptoms of concern

## 2017-11-11 NOTE — ED ADULT NURSE NOTE - OBJECTIVE STATEMENT
Patient   is  alert  and  oriented x3.  Color is  good  and  skin warm to touch.   She  denies  pain   or  any  other  discomfort.

## 2017-11-11 NOTE — ED PROVIDER NOTE - MEDICAL DECISION MAKING DETAILS
Hugo BRAGG: Patient is a 46 yo F w/hx of HTN, SLE, anemia here for high potassium on outpatient lab test. Patient was told to come to the emergency department. She had b/l leg cramps but denies any pain now. No cp, palpitations, sob, dizziness. + recent hospitalization for lupus nephritis and herpes zoster. Patient's Cr has been improving since her admission. She was told to restart her lasix yesterday. exam. No focal findings. a/p: hyperkalemia on outpatient labs - will repeat labs, ekg with no peaked t waves, will treat based on findings

## 2017-11-11 NOTE — ED PROCEDURE NOTE - PROCEDURE ADDITIONAL DETAILS
POCUS: Emergency Department Focused Ultrasound performed at patient's bedside.  The complete report will be available in PACS. INTERPRETATION:  Location: Right AC  Dynamic gray scale imaging of the target vessel was obtained using a high   frequency linear transducer. Both the target vein and surrounding   arterial structures were visualized and identified. The patency of the   targeted vein was confirmed with compression and lack of internal echoes.   There was direct visualization of needle entry into the vein followed by   successful catheter placement (20 gauge IV).    IMPRESSION:  Ultrasound-guided cannulation of right AC.

## 2017-11-11 NOTE — ED PROVIDER NOTE - CHIEF COMPLAINT
The patient is a 47y Female complaining of The patient is a 47y Female complaining of abnormal lab test.

## 2017-11-11 NOTE — ED PROVIDER NOTE - OBJECTIVE STATEMENT
47 year old female w/ a PMH of HTN, SLE, anemia presents c/o abnormal labs. Patient was at rheum office yesterday for follow up appointment to assess kidney function yesterday where blood work was done. Patient received call this morning that her potassium was elevated at 6.7 and was advised to come to ED. She states that she has had some leg cramps since yesterday denies chest pain, sob, n/v, headaches, dizziness. She was admitted a month ago for flare up of lupus nephritis and herpes zooster. Patient previously on lasix states was stopped during admission was told to restart yesterday.

## 2017-11-11 NOTE — ED PROVIDER NOTE - CARE PLAN
Principal Discharge DX:	Hyperkalemia Principal Discharge DX:	Hyperkalemia  Instructions for follow-up, activity and diet:	1. Follow up with Dr. Greco within 2-3 days of discharge.   2. Continue all at home medications except hold Valsartan until follow up. Ensure daily Furosemide until follow up   3. Return to ED for change of symptoms including chest pain, shortness of breath, dizziness, abdominal pain, nausea, vomiting and any other symptoms of concern

## 2017-11-11 NOTE — ED PROVIDER NOTE - PROGRESS NOTE DETAILS
Hugo BRAGG: Initial K is 7.2 with moderate hemolysis, repeat was 6.9 with moderate hemolysis, 3rd time, k was 6.1 with mild hemolysis. Will treat for hyperkalemia and repeat after treatment. Patient continues to be asymptomatic. Discusses case w/ patients PMD/ nephrologist Dr. Greco including potassium levels of Patient continues to be asymptomatic. Discusses case w/ patients PMD/ nephrologist Dr. Greco including potassium levels of 7.2 and 6.9 moderately hemolyzed and 6.1 moderately hemolyzed and reading of 5.5 after calc gluc, insulin and dextrose and kayexalate Patient continues to be asymptomatic. Discusses case w/ patients PMD/ nephrologist Dr. Greco including potassium levels of 7.2 and 6.9 moderately hemolyzed and 6.1 moderately hemolyzed and reading of 5.5 after calc gluc, insulin and dextrose and kayexalate. Dr. Greco states that he is okay with patient being discharged with theses findings. Patient is to follow up in office this week, hold valsartan till follow up but continue yana Garcia PA-C

## 2017-11-11 NOTE — ED ADULT NURSE REASSESSMENT NOTE - COMFORT CARE
plan of care explained/darkened lights/treatment delay explained/wait time explained/meal provided/warm blanket provided

## 2017-11-12 LAB — DSDNA AB SER-ACNC: 45 IU/ML

## 2017-11-27 ENCOUNTER — APPOINTMENT (OUTPATIENT)
Dept: RHEUMATOLOGY | Facility: CLINIC | Age: 47
End: 2017-11-27
Payer: COMMERCIAL

## 2017-11-27 VITALS
TEMPERATURE: 97.9 F | SYSTOLIC BLOOD PRESSURE: 138 MMHG | HEIGHT: 66 IN | DIASTOLIC BLOOD PRESSURE: 93 MMHG | HEART RATE: 88 BPM | BODY MASS INDEX: 35.2 KG/M2 | WEIGHT: 219 LBS

## 2017-11-27 PROCEDURE — 99215 OFFICE O/P EST HI 40 MIN: CPT

## 2017-11-28 LAB
25(OH)D3 SERPL-MCNC: 15.6 NG/ML
ALBUMIN SERPL ELPH-MCNC: 3.6 G/DL
ALP BLD-CCNC: 92 U/L
ALT SERPL-CCNC: 34 U/L
ANION GAP SERPL CALC-SCNC: 17 MMOL/L
APPEARANCE: CLEAR
AST SERPL-CCNC: 30 U/L
BACTERIA: NEGATIVE
BILIRUB SERPL-MCNC: 0.3 MG/DL
BILIRUBIN URINE: NEGATIVE
BLOOD URINE: ABNORMAL
BUN SERPL-MCNC: 59 MG/DL
C3 SERPL-MCNC: 66 MG/DL
C4 SERPL-MCNC: 10 MG/DL
CALCIUM SERPL-MCNC: 9.3 MG/DL
CHLORIDE SERPL-SCNC: 105 MMOL/L
CK SERPL-CCNC: 56 U/L
CO2 SERPL-SCNC: 16 MMOL/L
COLOR: YELLOW
CREAT SERPL-MCNC: 2.26 MG/DL
CREAT SPEC-SCNC: 92 MG/DL
CREAT/PROT UR: 1 RATIO
DSDNA AB SER-ACNC: 35 IU/ML
ENA RNP AB SER IA-ACNC: 0.3 AL
ENA SM AB SER IA-ACNC: 1.6 AL
ENA SS-A AB SER IA-ACNC: 1.2 AL
ENA SS-B AB SER IA-ACNC: <0.2 AL
GLUCOSE QUALITATIVE U: NEGATIVE MG/DL
GLUCOSE SERPL-MCNC: 89 MG/DL
HYALINE CASTS: 2 /LPF
KETONES URINE: NEGATIVE
LEUKOCYTE ESTERASE URINE: NEGATIVE
MICROSCOPIC-UA: NORMAL
NITRITE URINE: NEGATIVE
PH URINE: 5
POTASSIUM SERPL-SCNC: 5.1 MMOL/L
PROT SERPL-MCNC: 6.9 G/DL
PROT UR-MCNC: 96 MG/DL
PROTEIN URINE: 100 MG/DL
RED BLOOD CELLS URINE: 5 /HPF
SODIUM SERPL-SCNC: 138 MMOL/L
SPECIFIC GRAVITY URINE: 1.01
SQUAMOUS EPITHELIAL CELLS: 2 /HPF
UROBILINOGEN URINE: NEGATIVE MG/DL
WHITE BLOOD CELLS URINE: 3 /HPF

## 2017-12-13 LAB
BASOPHILS # BLD AUTO: 0.01 K/UL
BASOPHILS NFR BLD AUTO: 0.1 %
EOSINOPHIL # BLD AUTO: 0.03 K/UL
EOSINOPHIL NFR BLD AUTO: 0.2 %
HCT VFR BLD CALC: 27.3 %
HGB BLD-MCNC: 9.4 G/DL
IMM GRANULOCYTES NFR BLD AUTO: 0.1 %
LYMPHOCYTES # BLD AUTO: 1.13 K/UL
LYMPHOCYTES NFR BLD AUTO: 7.9 %
MAN DIFF?: NORMAL
MCHC RBC-ENTMCNC: 26.6 PG
MCHC RBC-ENTMCNC: 34.4 GM/DL
MCV RBC AUTO: 77.1 FL
MONOCYTES # BLD AUTO: 0.83 K/UL
MONOCYTES NFR BLD AUTO: 5.8 %
NEUTROPHILS # BLD AUTO: 12.32 K/UL
NEUTROPHILS NFR BLD AUTO: 85.9 %
PLATELET # BLD AUTO: 169 K/UL
RBC # BLD: 3.54 M/UL
RBC # FLD: 19.4 %
WBC # FLD AUTO: 14.34 K/UL

## 2018-02-23 PROBLEM — Z00.00 ENCOUNTER FOR PREVENTIVE HEALTH EXAMINATION: Noted: 2018-02-23

## 2018-03-05 ENCOUNTER — APPOINTMENT (OUTPATIENT)
Dept: RHEUMATOLOGY | Facility: CLINIC | Age: 48
End: 2018-03-05
Payer: COMMERCIAL

## 2018-03-05 VITALS
HEIGHT: 66 IN | WEIGHT: 210 LBS | OXYGEN SATURATION: 99 % | TEMPERATURE: 97.9 F | BODY MASS INDEX: 33.75 KG/M2 | SYSTOLIC BLOOD PRESSURE: 144 MMHG | DIASTOLIC BLOOD PRESSURE: 98 MMHG | HEART RATE: 76 BPM

## 2018-03-05 PROCEDURE — 99215 OFFICE O/P EST HI 40 MIN: CPT

## 2018-03-06 LAB
25(OH)D3 SERPL-MCNC: 20.2 NG/ML
ALBUMIN SERPL ELPH-MCNC: 4.1 G/DL
ALP BLD-CCNC: 92 U/L
ALT SERPL-CCNC: 20 U/L
ANION GAP SERPL CALC-SCNC: 12 MMOL/L
APPEARANCE: CLEAR
AST SERPL-CCNC: 16 U/L
BACTERIA: NEGATIVE
BASOPHILS # BLD AUTO: 0.01 K/UL
BASOPHILS NFR BLD AUTO: 0.1 %
BILIRUB SERPL-MCNC: 0.2 MG/DL
BILIRUBIN URINE: NEGATIVE
BLOOD URINE: ABNORMAL
BUN SERPL-MCNC: 33 MG/DL
C3 SERPL-MCNC: 68 MG/DL
C4 SERPL-MCNC: 10 MG/DL
CALCIUM SERPL-MCNC: 9.6 MG/DL
CALCIUM SERPL-MCNC: 9.6 MG/DL
CHLORIDE SERPL-SCNC: 106 MMOL/L
CK SERPL-CCNC: 35 U/L
CO2 SERPL-SCNC: 22 MMOL/L
COLOR: YELLOW
CREAT SERPL-MCNC: 1.71 MG/DL
CREAT SPEC-SCNC: 69 MG/DL
CREAT/PROT UR: 0.7 RATIO
DSDNA AB SER-ACNC: 46 IU/ML
EOSINOPHIL # BLD AUTO: 0.01 K/UL
EOSINOPHIL NFR BLD AUTO: 0.1 %
GLUCOSE QUALITATIVE U: NEGATIVE MG/DL
GLUCOSE SERPL-MCNC: 109 MG/DL
HCT VFR BLD CALC: 34.6 %
HGB BLD-MCNC: 11.2 G/DL
HYALINE CASTS: 9 /LPF
IMM GRANULOCYTES NFR BLD AUTO: 0.5 %
KETONES URINE: NEGATIVE
LEUKOCYTE ESTERASE URINE: ABNORMAL
LYMPHOCYTES # BLD AUTO: 1.26 K/UL
LYMPHOCYTES NFR BLD AUTO: 10 %
MAGNESIUM SERPL-MCNC: 1.9 MG/DL
MAN DIFF?: NORMAL
MCHC RBC-ENTMCNC: 25.3 PG
MCHC RBC-ENTMCNC: 32.4 GM/DL
MCV RBC AUTO: 78.1 FL
MICROSCOPIC-UA: NORMAL
MONOCYTES # BLD AUTO: 0.23 K/UL
MONOCYTES NFR BLD AUTO: 1.8 %
NEUTROPHILS # BLD AUTO: 10.97 K/UL
NEUTROPHILS NFR BLD AUTO: 87.5 %
NITRITE URINE: NEGATIVE
PARATHYROID HORMONE INTACT: 97 PG/ML
PH URINE: 5.5
PHOSPHATE SERPL-MCNC: 3.3 MG/DL
PLATELET # BLD AUTO: 202 K/UL
POTASSIUM SERPL-SCNC: 4.9 MMOL/L
PROT SERPL-MCNC: 7.3 G/DL
PROT UR-MCNC: 48 MG/DL
PROTEIN URINE: 30 MG/DL
RBC # BLD: 4.43 M/UL
RBC # FLD: 16.6 %
RED BLOOD CELLS URINE: 2 /HPF
SODIUM SERPL-SCNC: 140 MMOL/L
SPECIFIC GRAVITY URINE: 1.01
SQUAMOUS EPITHELIAL CELLS: 5 /HPF
UROBILINOGEN URINE: NEGATIVE MG/DL
WBC # FLD AUTO: 12.54 K/UL
WHITE BLOOD CELLS URINE: 66 /HPF

## 2018-03-07 LAB
ENA RNP AB SER IA-ACNC: 0.3 AL
ENA SM AB SER IA-ACNC: 2.4 AL
ENA SS-A AB SER IA-ACNC: 2 AL
ENA SS-B AB SER IA-ACNC: <0.2 AL

## 2018-03-12 ENCOUNTER — APPOINTMENT (OUTPATIENT)
Dept: GASTROENTEROLOGY | Facility: CLINIC | Age: 48
End: 2018-03-12

## 2018-03-14 ENCOUNTER — APPOINTMENT (OUTPATIENT)
Dept: RHEUMATOLOGY | Facility: CLINIC | Age: 48
End: 2018-03-14
Payer: COMMERCIAL

## 2018-03-14 VITALS
HEIGHT: 66 IN | DIASTOLIC BLOOD PRESSURE: 95 MMHG | BODY MASS INDEX: 33.75 KG/M2 | WEIGHT: 210 LBS | HEART RATE: 69 BPM | TEMPERATURE: 97.5 F | SYSTOLIC BLOOD PRESSURE: 139 MMHG | OXYGEN SATURATION: 98 %

## 2018-03-14 VITALS — SYSTOLIC BLOOD PRESSURE: 134 MMHG | DIASTOLIC BLOOD PRESSURE: 93 MMHG

## 2018-03-14 LAB
ANION GAP SERPL CALC-SCNC: 14 MMOL/L
BUN SERPL-MCNC: 33 MG/DL
CALCIUM SERPL-MCNC: 9.9 MG/DL
CHLORIDE SERPL-SCNC: 109 MMOL/L
CO2 SERPL-SCNC: 18 MMOL/L
CREAT SERPL-MCNC: 1.53 MG/DL
GLUCOSE SERPL-MCNC: 70 MG/DL
POTASSIUM SERPL-SCNC: 4.7 MMOL/L
SODIUM SERPL-SCNC: 141 MMOL/L

## 2018-03-14 PROCEDURE — 99214 OFFICE O/P EST MOD 30 MIN: CPT | Mod: 25

## 2018-03-14 RX ORDER — CLOTRIMAZOLE 10 MG/1
10 LOZENGE ORAL
Qty: 25 | Refills: 5 | Status: DISCONTINUED | COMMUNITY
Start: 2017-10-09 | End: 2018-03-14

## 2018-03-15 LAB
APPEARANCE: CLEAR
BACTERIA: NEGATIVE
BILIRUBIN URINE: NEGATIVE
BLOOD URINE: ABNORMAL
COLOR: YELLOW
CREAT SPEC-SCNC: 103 MG/DL
CREAT/PROT UR: 0.7 RATIO
GLUCOSE QUALITATIVE U: NEGATIVE MG/DL
HYALINE CASTS: 2 /LPF
KETONES URINE: NEGATIVE
LEUKOCYTE ESTERASE URINE: ABNORMAL
MICROSCOPIC-UA: NORMAL
NITRITE URINE: NEGATIVE
PH URINE: 5.5
PROT UR-MCNC: 74 MG/DL
PROTEIN URINE: 100 MG/DL
RED BLOOD CELLS URINE: 5 /HPF
SPECIFIC GRAVITY URINE: 1.02
SQUAMOUS EPITHELIAL CELLS: 3 /HPF
UROBILINOGEN URINE: NEGATIVE MG/DL
WHITE BLOOD CELLS URINE: 6 /HPF

## 2018-04-03 ENCOUNTER — APPOINTMENT (OUTPATIENT)
Dept: RHEUMATOLOGY | Facility: CLINIC | Age: 48
End: 2018-04-03
Payer: COMMERCIAL

## 2018-04-03 VITALS — HEART RATE: 78 BPM | RESPIRATION RATE: 16 BRPM | DIASTOLIC BLOOD PRESSURE: 99 MMHG | SYSTOLIC BLOOD PRESSURE: 149 MMHG

## 2018-04-03 LAB
25(OH)D3 SERPL-MCNC: 17.3 NG/ML
APPEARANCE: CLEAR
BACTERIA: ABNORMAL
BASOPHILS # BLD AUTO: 0.01 K/UL
BASOPHILS NFR BLD AUTO: 0.1 %
BILIRUBIN URINE: NEGATIVE
BLOOD URINE: ABNORMAL
C3 SERPL-MCNC: 67 MG/DL
C4 SERPL-MCNC: 8 MG/DL
COLOR: YELLOW
CREAT SPEC-SCNC: 91 MG/DL
CREAT/PROT UR: 1 RATIO
EOSINOPHIL # BLD AUTO: 0.01 K/UL
EOSINOPHIL NFR BLD AUTO: 0.1 %
GLUCOSE QUALITATIVE U: NEGATIVE MG/DL
HBA1C MFR BLD HPLC: 5 %
HCT VFR BLD CALC: 33.9 %
HGB BLD-MCNC: 10.9 G/DL
HYALINE CASTS: 3 /LPF
IMM GRANULOCYTES NFR BLD AUTO: 0.3 %
KETONES URINE: NEGATIVE
LEUKOCYTE ESTERASE URINE: ABNORMAL
LYMPHOCYTES # BLD AUTO: 1.14 K/UL
LYMPHOCYTES NFR BLD AUTO: 9.2 %
MAN DIFF?: NORMAL
MCHC RBC-ENTMCNC: 25.1 PG
MCHC RBC-ENTMCNC: 32.2 GM/DL
MCV RBC AUTO: 77.9 FL
MICROSCOPIC-UA: NORMAL
MONOCYTES # BLD AUTO: 0.53 K/UL
MONOCYTES NFR BLD AUTO: 4.3 %
NEUTROPHILS # BLD AUTO: 10.62 K/UL
NEUTROPHILS NFR BLD AUTO: 86 %
NITRITE URINE: NEGATIVE
PH URINE: 5.5
PLATELET # BLD AUTO: 165 K/UL
PROT UR-MCNC: 89 MG/DL
PROTEIN URINE: 100 MG/DL
RBC # BLD: 4.35 M/UL
RBC # FLD: 16.8 %
RED BLOOD CELLS URINE: 5 /HPF
SPECIFIC GRAVITY URINE: 1.01
SQUAMOUS EPITHELIAL CELLS: 4 /HPF
UROBILINOGEN URINE: NEGATIVE MG/DL
WBC # FLD AUTO: 12.35 K/UL
WHITE BLOOD CELLS URINE: 37 /HPF

## 2018-04-03 PROCEDURE — 99215 OFFICE O/P EST HI 40 MIN: CPT

## 2018-04-04 LAB
ALBUMIN SERPL ELPH-MCNC: 3.7 G/DL
ALP BLD-CCNC: 88 U/L
ALT SERPL-CCNC: 14 U/L
ANION GAP SERPL CALC-SCNC: 18 MMOL/L
AST SERPL-CCNC: 13 U/L
BILIRUB SERPL-MCNC: 0.3 MG/DL
BUN SERPL-MCNC: 24 MG/DL
CALCIUM SERPL-MCNC: 9.5 MG/DL
CHLORIDE SERPL-SCNC: 109 MMOL/L
CHOLEST SERPL-MCNC: 198 MG/DL
CHOLEST/HDLC SERPL: 3 RATIO
CK SERPL-CCNC: 35 U/L
CO2 SERPL-SCNC: 17 MMOL/L
CREAT SERPL-MCNC: 1.39 MG/DL
DSDNA AB SER-ACNC: 31 IU/ML
GLUCOSE SERPL-MCNC: 94 MG/DL
HDLC SERPL-MCNC: 66 MG/DL
LDLC SERPL CALC-MCNC: 107 MG/DL
POTASSIUM SERPL-SCNC: 4.3 MMOL/L
PROT SERPL-MCNC: 7 G/DL
SODIUM SERPL-SCNC: 144 MMOL/L
TRIGL SERPL-MCNC: 125 MG/DL

## 2018-04-18 ENCOUNTER — APPOINTMENT (OUTPATIENT)
Dept: RHEUMATOLOGY | Facility: CLINIC | Age: 48
End: 2018-04-18
Payer: COMMERCIAL

## 2018-04-18 VITALS — HEART RATE: 63 BPM | SYSTOLIC BLOOD PRESSURE: 131 MMHG | TEMPERATURE: 97.5 F | DIASTOLIC BLOOD PRESSURE: 88 MMHG

## 2018-04-18 LAB
25(OH)D3 SERPL-MCNC: 21.3 NG/ML
ALBUMIN SERPL ELPH-MCNC: 3.9 G/DL
ALP BLD-CCNC: 87 U/L
ALT SERPL-CCNC: 11 U/L
ANION GAP SERPL CALC-SCNC: 19 MMOL/L
APPEARANCE: ABNORMAL
AST SERPL-CCNC: 12 U/L
BACTERIA: ABNORMAL
BASOPHILS # BLD AUTO: 0.02 K/UL
BASOPHILS NFR BLD AUTO: 0.2 %
BILIRUB SERPL-MCNC: 0.2 MG/DL
BILIRUBIN URINE: NEGATIVE
BLOOD URINE: ABNORMAL
BUN SERPL-MCNC: 29 MG/DL
CALCIUM SERPL-MCNC: 9 MG/DL
CHLORIDE SERPL-SCNC: 115 MMOL/L
CO2 SERPL-SCNC: 17 MMOL/L
COLOR: ABNORMAL
CREAT SERPL-MCNC: 1.5 MG/DL
CREAT SPEC-SCNC: 124 MG/DL
CREAT/PROT UR: 0.6 RATIO
EOSINOPHIL # BLD AUTO: 0.13 K/UL
EOSINOPHIL NFR BLD AUTO: 1.5 %
GLUCOSE QUALITATIVE U: NEGATIVE MG/DL
GLUCOSE SERPL-MCNC: 76 MG/DL
HCT VFR BLD CALC: 36.3 %
HGB BLD-MCNC: 11.7 G/DL
HYALINE CASTS: 1 /LPF
IMM GRANULOCYTES NFR BLD AUTO: 0.3 %
IRON SATN MFR SERPL: 17 %
IRON SERPL-MCNC: 52 UG/DL
KETONES URINE: NEGATIVE
LEUKOCYTE ESTERASE URINE: ABNORMAL
LYMPHOCYTES # BLD AUTO: 2.11 K/UL
LYMPHOCYTES NFR BLD AUTO: 24.3 %
MAN DIFF?: NORMAL
MCHC RBC-ENTMCNC: 24.7 PG
MCHC RBC-ENTMCNC: 32.2 GM/DL
MCV RBC AUTO: 76.6 FL
MICROSCOPIC-UA: NORMAL
MONOCYTES # BLD AUTO: 0.62 K/UL
MONOCYTES NFR BLD AUTO: 7.1 %
NEUTROPHILS # BLD AUTO: 5.77 K/UL
NEUTROPHILS NFR BLD AUTO: 66.6 %
NITRITE URINE: NEGATIVE
PH URINE: 5.5
PLATELET # BLD AUTO: 159 K/UL
POTASSIUM SERPL-SCNC: 4.8 MMOL/L
PROT SERPL-MCNC: 7.5 G/DL
PROT UR-MCNC: 77 MG/DL
PROTEIN URINE: 100 MG/DL
RBC # BLD: 4.74 M/UL
RBC # BLD: 4.74 M/UL
RBC # FLD: 17.3 %
RED BLOOD CELLS URINE: 138 /HPF
RETICS # AUTO: 1.4 %
RETICS AGGREG/RBC NFR: 65.4 K/UL
SODIUM SERPL-SCNC: 151 MMOL/L
SPECIFIC GRAVITY URINE: 1.02
SQUAMOUS EPITHELIAL CELLS: 6 /HPF
TIBC SERPL-MCNC: 313 UG/DL
UIBC SERPL-MCNC: 261 UG/DL
UROBILINOGEN URINE: NEGATIVE MG/DL
WBC # FLD AUTO: 8.68 K/UL
WHITE BLOOD CELLS URINE: 70 /HPF

## 2018-04-18 PROCEDURE — 99214 OFFICE O/P EST MOD 30 MIN: CPT

## 2018-06-25 ENCOUNTER — APPOINTMENT (OUTPATIENT)
Dept: GASTROENTEROLOGY | Facility: CLINIC | Age: 48
End: 2018-06-25
Payer: COMMERCIAL

## 2018-06-25 VITALS
OXYGEN SATURATION: 98 % | DIASTOLIC BLOOD PRESSURE: 86 MMHG | WEIGHT: 215 LBS | HEART RATE: 75 BPM | BODY MASS INDEX: 34.55 KG/M2 | HEIGHT: 66 IN | SYSTOLIC BLOOD PRESSURE: 122 MMHG

## 2018-06-25 DIAGNOSIS — Z80.0 FAMILY HISTORY OF MALIGNANT NEOPLASM OF DIGESTIVE ORGANS: ICD-10-CM

## 2018-06-25 DIAGNOSIS — Z83.71 FAMILY HISTORY OF COLONIC POLYPS: ICD-10-CM

## 2018-06-25 DIAGNOSIS — Z82.49 FAMILY HISTORY OF ISCHEMIC HEART DISEASE AND OTHER DISEASES OF THE CIRCULATORY SYSTEM: ICD-10-CM

## 2018-06-25 DIAGNOSIS — D64.9 ANEMIA, UNSPECIFIED: ICD-10-CM

## 2018-06-25 DIAGNOSIS — N18.3 CHRONIC KIDNEY DISEASE, STAGE 3 (MODERATE): ICD-10-CM

## 2018-06-25 DIAGNOSIS — R12 HEARTBURN: ICD-10-CM

## 2018-06-25 DIAGNOSIS — D63.1 CHRONIC KIDNEY DISEASE, STAGE 3 (MODERATE): ICD-10-CM

## 2018-06-25 PROCEDURE — 99245 OFF/OP CONSLTJ NEW/EST HI 55: CPT

## 2018-06-25 RX ORDER — VALSARTAN 80 MG/1
80 TABLET, COATED ORAL DAILY
Qty: 60 | Refills: 3 | Status: DISCONTINUED | COMMUNITY
End: 2018-06-25

## 2018-06-25 RX ORDER — ADHESIVE TAPE 3"X 2.3 YD
50 MCG TAPE, NON-MEDICATED TOPICAL
Refills: 0 | Status: ACTIVE | COMMUNITY

## 2018-06-25 RX ORDER — PREDNISONE 10 MG/1
10 TABLET ORAL
Qty: 270 | Refills: 3 | Status: DISCONTINUED | COMMUNITY
Start: 2017-11-10 | End: 2018-06-25

## 2018-06-25 RX ORDER — NEBIVOLOL HYDROCHLORIDE 5 MG/1
5 TABLET ORAL DAILY
Qty: 30 | Refills: 3 | Status: DISCONTINUED | COMMUNITY
End: 2018-06-25

## 2018-07-16 NOTE — ASU PATIENT PROFILE, ADULT - NS PRO TALK SOMEONE YN
Service Date: 07/16/2018      HISTORY OF PRESENT ILLNESS:  Jama is a pleasant 89-year-old gentleman who is here today in the [facility] for management of his heart failure with a reduced ejection fraction.  He is New York Heart Association class II, stage C.  His last echo demonstrated ejection fraction of greater than 70% with moderate concentric left ventricular hypertrophy.  Right ventricle was moderately dilated.  There was mildly decreased right ventricular systolic function, biatrial enlargement, moderate to moderate-to-severe 2+ tricuspid regurgitation.  This echo was done when he was in the hospital with AFib with a rapid ventricular response.  In the past, he has had a negative stress test.  He also in the past has undergone a right heart catheterization which showed normal filling pressures with a slowing down of his heart rate.  He has had no further hospitalizations for congestive heart failure.  Today he states he is doing well.  He has moved to [facility] since we have seen him and with that he goes down to the exercise facility every day.  He is doing light weightlifting.  He works on a bicycle and a treadmill.  He is going to start walking the halls.  He is taking some balance and flexibility classes.  He does check his blood pressure once or twice a day.  He said when his systolic blood pressure is in the 90s he does feel a little sluggish, but when his heart rate is in the mid 100s he feels good.  He denies chest pain, chest pressure.  He denies worsening shortness of breath at rest or with exertion.  He reports no PND.  He has a healing lower ankle ulcer that is healing well and is managed by Dr. Benjamin.  Please see review of system and physical exam.      IMPRESSION AND PLAN:  This is an 89-year-old gentleman with a previous history of acute decompensated heart failure in the setting of rapid ventricular rate with congestive heart failure.  His ventricular rates continue to be well  controlled.  However, he does have bouts of lower blood pressure.  We have him on a stable dose of metoprolol, Entresto, warfarin and Coumadin.  I have asked him to stop taking his blood pressure as frequently, but he does like to know what his numbers are.  Therefore, I have recommended that he take his metoprolol 50 mg in the morning and 100 in the evening instead of previously 100 in the morning, 50 in the evening.  That will allow for his blood pressure to be a little higher during his waking hours when he works out.  At this point, I will keep him on the same dose of Entresto.  In the future, if he has persistent low blood pressures we also could consider reducing his daily dose of furosemide.      Since he has moved to [facility], he has stopped weighing himself daily.  Today, I reinforced how important this is so we can closely monitor his weights to prevent future hospitalizations.  It has been my pleasure to work with Yinka.  He will follow up in 3 months in the [facility].         ERIC GUERRA, MANISH             D: 2018   T: 2018   MT: MARTIN      Name:     YINKA GONZALEZ   MRN:      [id number]        Account:      [id number]   :      1929           Service Date: 2018      Document: [id number]       no

## 2018-07-24 ENCOUNTER — OUTPATIENT (OUTPATIENT)
Dept: OUTPATIENT SERVICES | Facility: HOSPITAL | Age: 48
LOS: 1 days | End: 2018-07-24
Payer: COMMERCIAL

## 2018-07-24 ENCOUNTER — APPOINTMENT (OUTPATIENT)
Dept: GASTROENTEROLOGY | Facility: HOSPITAL | Age: 48
End: 2018-07-24
Payer: COMMERCIAL

## 2018-07-24 DIAGNOSIS — R12 HEARTBURN: ICD-10-CM

## 2018-07-24 DIAGNOSIS — D36.9 BENIGN NEOPLASM, UNSPECIFIED SITE: Chronic | ICD-10-CM

## 2018-07-24 DIAGNOSIS — R68.89 OTHER GENERAL SYMPTOMS AND SIGNS: ICD-10-CM

## 2018-07-24 PROCEDURE — 91037 ESOPH IMPED FUNCTION TEST: CPT | Mod: 26

## 2018-07-24 PROCEDURE — 91010 ESOPHAGUS MOTILITY STUDY: CPT | Mod: 26

## 2018-07-24 PROCEDURE — 91010 ESOPHAGUS MOTILITY STUDY: CPT

## 2018-07-25 PROCEDURE — 91038 ESOPH IMPED FUNCT TEST > 1HR: CPT | Mod: 26

## 2018-08-22 ENCOUNTER — APPOINTMENT (OUTPATIENT)
Dept: GASTROENTEROLOGY | Facility: CLINIC | Age: 48
End: 2018-08-22
Payer: COMMERCIAL

## 2018-08-22 VITALS
OXYGEN SATURATION: 100 % | HEIGHT: 66 IN | HEART RATE: 78 BPM | DIASTOLIC BLOOD PRESSURE: 100 MMHG | WEIGHT: 189 LBS | TEMPERATURE: 97.5 F | BODY MASS INDEX: 30.37 KG/M2 | SYSTOLIC BLOOD PRESSURE: 142 MMHG | RESPIRATION RATE: 16 BRPM

## 2018-08-22 DIAGNOSIS — K21.9 GASTRO-ESOPHAGEAL REFLUX DISEASE W/OUT ESOPHAGITIS: ICD-10-CM

## 2018-08-22 DIAGNOSIS — R68.89 OTHER GENERAL SYMPTOMS AND SIGNS: ICD-10-CM

## 2018-08-22 DIAGNOSIS — R43.2 PARAGEUSIA: ICD-10-CM

## 2018-08-22 PROCEDURE — 99214 OFFICE O/P EST MOD 30 MIN: CPT

## 2018-09-19 ENCOUNTER — APPOINTMENT (OUTPATIENT)
Dept: RHEUMATOLOGY | Facility: CLINIC | Age: 48
End: 2018-09-19
Payer: COMMERCIAL

## 2018-09-19 VITALS
DIASTOLIC BLOOD PRESSURE: 85 MMHG | WEIGHT: 189 LBS | HEIGHT: 66 IN | BODY MASS INDEX: 30.37 KG/M2 | HEART RATE: 76 BPM | RESPIRATION RATE: 14 BRPM | SYSTOLIC BLOOD PRESSURE: 123 MMHG

## 2018-09-19 PROCEDURE — 99215 OFFICE O/P EST HI 40 MIN: CPT

## 2018-10-16 ENCOUNTER — APPOINTMENT (OUTPATIENT)
Dept: RHEUMATOLOGY | Facility: CLINIC | Age: 48
End: 2018-10-16

## 2018-10-30 ENCOUNTER — APPOINTMENT (OUTPATIENT)
Dept: RHEUMATOLOGY | Facility: CLINIC | Age: 48
End: 2018-10-30
Payer: COMMERCIAL

## 2018-10-30 VITALS
DIASTOLIC BLOOD PRESSURE: 82 MMHG | BODY MASS INDEX: 36.32 KG/M2 | SYSTOLIC BLOOD PRESSURE: 133 MMHG | RESPIRATION RATE: 14 BRPM | HEART RATE: 80 BPM | HEIGHT: 66 IN | WEIGHT: 226 LBS | TEMPERATURE: 97.8 F | OXYGEN SATURATION: 99 %

## 2018-10-30 PROCEDURE — 99214 OFFICE O/P EST MOD 30 MIN: CPT

## 2018-11-07 ENCOUNTER — FORM ENCOUNTER (OUTPATIENT)
Age: 48
End: 2018-11-07

## 2018-11-08 ENCOUNTER — FORM ENCOUNTER (OUTPATIENT)
Age: 48
End: 2018-11-08

## 2018-11-08 ENCOUNTER — RESULT REVIEW (OUTPATIENT)
Age: 48
End: 2018-11-08

## 2019-01-24 ENCOUNTER — APPOINTMENT (OUTPATIENT)
Dept: ULTRASOUND IMAGING | Facility: IMAGING CENTER | Age: 49
End: 2019-01-24
Payer: COMMERCIAL

## 2019-01-24 ENCOUNTER — APPOINTMENT (OUTPATIENT)
Dept: MAMMOGRAPHY | Facility: IMAGING CENTER | Age: 49
End: 2019-01-24
Payer: COMMERCIAL

## 2019-01-24 ENCOUNTER — OUTPATIENT (OUTPATIENT)
Dept: OUTPATIENT SERVICES | Facility: HOSPITAL | Age: 49
LOS: 1 days | End: 2019-01-24
Payer: COMMERCIAL

## 2019-01-24 DIAGNOSIS — D36.9 BENIGN NEOPLASM, UNSPECIFIED SITE: Chronic | ICD-10-CM

## 2019-01-24 DIAGNOSIS — Z00.00 ENCOUNTER FOR GENERAL ADULT MEDICAL EXAMINATION WITHOUT ABNORMAL FINDINGS: ICD-10-CM

## 2019-01-24 PROCEDURE — 76641 ULTRASOUND BREAST COMPLETE: CPT

## 2019-01-24 PROCEDURE — 77063 BREAST TOMOSYNTHESIS BI: CPT | Mod: 26

## 2019-01-24 PROCEDURE — 77063 BREAST TOMOSYNTHESIS BI: CPT

## 2019-01-24 PROCEDURE — 77067 SCR MAMMO BI INCL CAD: CPT | Mod: 26

## 2019-01-24 PROCEDURE — 76641 ULTRASOUND BREAST COMPLETE: CPT | Mod: 26,50

## 2019-01-24 PROCEDURE — 77067 SCR MAMMO BI INCL CAD: CPT

## 2019-02-14 ENCOUNTER — APPOINTMENT (OUTPATIENT)
Dept: RHEUMATOLOGY | Facility: CLINIC | Age: 49
End: 2019-02-14
Payer: COMMERCIAL

## 2019-02-14 VITALS
RESPIRATION RATE: 16 BRPM | HEIGHT: 66 IN | DIASTOLIC BLOOD PRESSURE: 88 MMHG | WEIGHT: 220 LBS | TEMPERATURE: 97.8 F | SYSTOLIC BLOOD PRESSURE: 131 MMHG | OXYGEN SATURATION: 99 % | HEART RATE: 84 BPM | BODY MASS INDEX: 35.36 KG/M2

## 2019-02-14 LAB
25(OH)D3 SERPL-MCNC: 10.8 NG/ML
ALBUMIN SERPL ELPH-MCNC: 3.5 G/DL
ALP BLD-CCNC: 105 U/L
ALT SERPL-CCNC: 11 U/L
ANION GAP SERPL CALC-SCNC: 11 MMOL/L
APPEARANCE: ABNORMAL
AST SERPL-CCNC: 13 U/L
BACTERIA: NEGATIVE
BASOPHILS # BLD AUTO: 0.02 K/UL
BASOPHILS NFR BLD AUTO: 0.2 %
BILIRUB SERPL-MCNC: 0.3 MG/DL
BILIRUBIN URINE: NEGATIVE
BLOOD URINE: ABNORMAL
BUN SERPL-MCNC: 24 MG/DL
C3 SERPL-MCNC: 64 MG/DL
CALCIUM SERPL-MCNC: 8.8 MG/DL
CHLORIDE SERPL-SCNC: 110 MMOL/L
CHOLEST SERPL-MCNC: 161 MG/DL
CHOLEST/HDLC SERPL: 3.4 RATIO
CK SERPL-CCNC: 65 U/L
CO2 SERPL-SCNC: 19 MMOL/L
COLOR: YELLOW
CREAT SERPL-MCNC: 1.43 MG/DL
ENA RNP AB SER IA-ACNC: 0.6 AL
ENA SM AB SER IA-ACNC: 2.6 AL
ENA SS-A AB SER IA-ACNC: 3.3 AL
ENA SS-B AB SER IA-ACNC: <0.2 AL
EOSINOPHIL # BLD AUTO: 0.08 K/UL
EOSINOPHIL NFR BLD AUTO: 0.8 %
GLUCOSE QUALITATIVE U: NEGATIVE MG/DL
GLUCOSE SERPL-MCNC: 72 MG/DL
HBA1C MFR BLD HPLC: 4.8 %
HCT VFR BLD CALC: 32.6 %
HDLC SERPL-MCNC: 48 MG/DL
HGB BLD-MCNC: 10.6 G/DL
HYALINE CASTS: 2 /LPF
IMM GRANULOCYTES NFR BLD AUTO: 0.3 %
KETONES URINE: NEGATIVE
LDLC SERPL CALC-MCNC: 80 MG/DL
LEUKOCYTE ESTERASE URINE: ABNORMAL
LYMPHOCYTES # BLD AUTO: 2 K/UL
LYMPHOCYTES NFR BLD AUTO: 19.3 %
MAN DIFF?: NORMAL
MCHC RBC-ENTMCNC: 24.1 PG
MCHC RBC-ENTMCNC: 32.5 GM/DL
MCV RBC AUTO: 74.1 FL
MICROSCOPIC-UA: NORMAL
MONOCYTES # BLD AUTO: 0.89 K/UL
MONOCYTES NFR BLD AUTO: 8.6 %
NEUTROPHILS # BLD AUTO: 7.34 K/UL
NEUTROPHILS NFR BLD AUTO: 70.8 %
NITRITE URINE: NEGATIVE
PH URINE: 5.5
PLATELET # BLD AUTO: 122 K/UL
POTASSIUM SERPL-SCNC: 4.3 MMOL/L
PROT SERPL-MCNC: 7.1 G/DL
PROTEIN URINE: 300 MG/DL
RBC # BLD: 4.4 M/UL
RBC # FLD: 18.6 %
RED BLOOD CELLS URINE: 51 /HPF
SODIUM SERPL-SCNC: 140 MMOL/L
SPECIFIC GRAVITY URINE: 1.02
SQUAMOUS EPITHELIAL CELLS: 5 /HPF
TRIGL SERPL-MCNC: 165 MG/DL
TSH SERPL-ACNC: 1.97 UIU/ML
UROBILINOGEN URINE: NEGATIVE MG/DL
WBC # FLD AUTO: 10.36 K/UL
WHITE BLOOD CELLS URINE: 24 /HPF

## 2019-02-14 PROCEDURE — G0009: CPT

## 2019-02-14 PROCEDURE — 90732 PPSV23 VACC 2 YRS+ SUBQ/IM: CPT

## 2019-02-14 PROCEDURE — 99214 OFFICE O/P EST MOD 30 MIN: CPT | Mod: 25

## 2019-02-15 LAB
C4 SERPL-MCNC: 5 MG/DL
CREAT SPEC-SCNC: 108 MG/DL
CREAT/PROT UR: 2 RATIO
DSDNA AB SER-ACNC: 58 IU/ML
PROT UR-MCNC: 219 MG/DL

## 2019-03-04 ENCOUNTER — TRANSCRIPTION ENCOUNTER (OUTPATIENT)
Age: 49
End: 2019-03-04

## 2019-03-11 ENCOUNTER — APPOINTMENT (OUTPATIENT)
Dept: RHEUMATOLOGY | Facility: CLINIC | Age: 49
End: 2019-03-11
Payer: COMMERCIAL

## 2019-03-11 VITALS — RESPIRATION RATE: 16 BRPM | DIASTOLIC BLOOD PRESSURE: 87 MMHG | HEART RATE: 84 BPM | SYSTOLIC BLOOD PRESSURE: 127 MMHG

## 2019-03-11 LAB
ALBUMIN SERPL ELPH-MCNC: 3.6 G/DL
ALP BLD-CCNC: 112 U/L
ALT SERPL-CCNC: 11 U/L
ANION GAP SERPL CALC-SCNC: 10 MMOL/L
APPEARANCE: CLEAR
AST SERPL-CCNC: 14 U/L
BACTERIA: NEGATIVE
BASOPHILS # BLD AUTO: 0.03 K/UL
BASOPHILS NFR BLD AUTO: 0.4 %
BILIRUB SERPL-MCNC: 0.3 MG/DL
BILIRUBIN URINE: NEGATIVE
BLOOD URINE: NORMAL
BUN SERPL-MCNC: 23 MG/DL
C3 SERPL-MCNC: 66 MG/DL
CALCIUM SERPL-MCNC: 9 MG/DL
CHLORIDE SERPL-SCNC: 110 MMOL/L
CK SERPL-CCNC: 74 U/L
CO2 SERPL-SCNC: 20 MMOL/L
COLOR: NORMAL
CREAT SERPL-MCNC: 1.57 MG/DL
EOSINOPHIL # BLD AUTO: 0.17 K/UL
EOSINOPHIL NFR BLD AUTO: 2.3 %
GLUCOSE QUALITATIVE U: NEGATIVE
GLUCOSE SERPL-MCNC: 71 MG/DL
HCT VFR BLD CALC: 32.2 %
HGB BLD-MCNC: 10.3 G/DL
HYALINE CASTS: 2 /LPF
IMM GRANULOCYTES NFR BLD AUTO: 0.3 %
KETONES URINE: NEGATIVE
LEUKOCYTE ESTERASE URINE: ABNORMAL
LYMPHOCYTES # BLD AUTO: 1.65 K/UL
LYMPHOCYTES NFR BLD AUTO: 22 %
MAN DIFF?: NORMAL
MCHC RBC-ENTMCNC: 24.2 PG
MCHC RBC-ENTMCNC: 32 GM/DL
MCV RBC AUTO: 75.6 FL
MICROSCOPIC-UA: NORMAL
MONOCYTES # BLD AUTO: 0.99 K/UL
MONOCYTES NFR BLD AUTO: 13.2 %
NEUTROPHILS # BLD AUTO: 4.63 K/UL
NEUTROPHILS NFR BLD AUTO: 61.8 %
NITRITE URINE: NEGATIVE
PH URINE: 6
PLATELET # BLD AUTO: 139 K/UL
POTASSIUM SERPL-SCNC: 4.6 MMOL/L
PROT SERPL-MCNC: 7.1 G/DL
PROTEIN URINE: ABNORMAL
RBC # BLD: 4.26 M/UL
RBC # FLD: 19.6 %
RED BLOOD CELLS URINE: 3 /HPF
SODIUM SERPL-SCNC: 140 MMOL/L
SPECIFIC GRAVITY URINE: 1.02
SQUAMOUS EPITHELIAL CELLS: 4 /HPF
URATE SERPL-MCNC: 8.9 MG/DL
UROBILINOGEN URINE: NORMAL
WBC # FLD AUTO: 7.49 K/UL
WHITE BLOOD CELLS URINE: 10 /HPF

## 2019-03-11 PROCEDURE — 99214 OFFICE O/P EST MOD 30 MIN: CPT

## 2019-03-12 LAB
C4 SERPL-MCNC: 5 MG/DL
CREAT SPEC-SCNC: 140 MG/DL
CREAT/PROT UR: 1.1 RATIO
DSDNA AB SER-ACNC: 91 IU/ML
PROT UR-MCNC: 146 MG/DL

## 2019-04-15 ENCOUNTER — LABORATORY RESULT (OUTPATIENT)
Age: 49
End: 2019-04-15

## 2019-04-15 ENCOUNTER — APPOINTMENT (OUTPATIENT)
Dept: RHEUMATOLOGY | Facility: CLINIC | Age: 49
End: 2019-04-15
Payer: COMMERCIAL

## 2019-04-15 VITALS — SYSTOLIC BLOOD PRESSURE: 119 MMHG | HEART RATE: 62 BPM | DIASTOLIC BLOOD PRESSURE: 86 MMHG | RESPIRATION RATE: 16 BRPM

## 2019-04-15 LAB
25(OH)D3 SERPL-MCNC: 13.4 NG/ML
ALBUMIN SERPL ELPH-MCNC: 3.7 G/DL
ALP BLD-CCNC: 122 U/L
ALT SERPL-CCNC: 11 U/L
ANION GAP SERPL CALC-SCNC: 11 MMOL/L
APPEARANCE: CLEAR
APTT BLD: 31.2 SEC
AST SERPL-CCNC: 15 U/L
BACTERIA: NEGATIVE
BASOPHILS # BLD AUTO: 0.03 K/UL
BASOPHILS NFR BLD AUTO: 0.3 %
BILIRUB SERPL-MCNC: 0.3 MG/DL
BILIRUBIN URINE: NEGATIVE
BLOOD URINE: NORMAL
BUN SERPL-MCNC: 27 MG/DL
C3 SERPL-MCNC: 66 MG/DL
C4 SERPL-MCNC: 4 MG/DL
CALCIUM SERPL-MCNC: 9.3 MG/DL
CHLORIDE SERPL-SCNC: 109 MMOL/L
CHOLEST SERPL-MCNC: 150 MG/DL
CHOLEST/HDLC SERPL: 3.3 RATIO
CK SERPL-CCNC: 58 U/L
CO2 SERPL-SCNC: 20 MMOL/L
COLOR: NORMAL
CREAT SERPL-MCNC: 1.66 MG/DL
CREAT SPEC-SCNC: 144 MG/DL
CREAT/PROT UR: 0.9 RATIO
EOSINOPHIL # BLD AUTO: 0.08 K/UL
EOSINOPHIL NFR BLD AUTO: 0.9 %
GLUCOSE QUALITATIVE U: NEGATIVE
GLUCOSE SERPL-MCNC: 84 MG/DL
HCT VFR BLD CALC: 31.5 %
HDLC SERPL-MCNC: 45 MG/DL
HGB BLD-MCNC: 10.5 G/DL
HYALINE CASTS: 1 /LPF
IMM GRANULOCYTES NFR BLD AUTO: 0.2 %
INR PPP: 0.94 RATIO
KETONES URINE: NEGATIVE
LDLC SERPL CALC-MCNC: 77 MG/DL
LEUKOCYTE ESTERASE URINE: ABNORMAL
LYMPHOCYTES # BLD AUTO: 2.07 K/UL
LYMPHOCYTES NFR BLD AUTO: 24 %
MAN DIFF?: NORMAL
MCHC RBC-ENTMCNC: 24.5 PG
MCHC RBC-ENTMCNC: 33.3 GM/DL
MCV RBC AUTO: 73.4 FL
MICROSCOPIC-UA: NORMAL
MONOCYTES # BLD AUTO: 1.07 K/UL
MONOCYTES NFR BLD AUTO: 12.4 %
NEUTROPHILS # BLD AUTO: 5.35 K/UL
NEUTROPHILS NFR BLD AUTO: 62.2 %
NITRITE URINE: NEGATIVE
PH URINE: 6
PLATELET # BLD AUTO: 125 K/UL
POTASSIUM SERPL-SCNC: 4.4 MMOL/L
PROT SERPL-MCNC: 7.7 G/DL
PROT UR-MCNC: 124 MG/DL
PROTEIN URINE: ABNORMAL
PT BLD: 10.6 SEC
RBC # BLD: 4.29 M/UL
RBC # FLD: 18.8 %
RED BLOOD CELLS URINE: 4 /HPF
SODIUM SERPL-SCNC: 140 MMOL/L
SPECIFIC GRAVITY URINE: 1.02
SQUAMOUS EPITHELIAL CELLS: 4 /HPF
TRIGL SERPL-MCNC: 141 MG/DL
TSH SERPL-ACNC: 2.12 UIU/ML
UROBILINOGEN URINE: NORMAL
WBC # FLD AUTO: 8.62 K/UL
WHITE BLOOD CELLS URINE: 7 /HPF

## 2019-04-15 PROCEDURE — 99214 OFFICE O/P EST MOD 30 MIN: CPT

## 2019-04-15 NOTE — HISTORY OF PRESENT ILLNESS
[FreeTextEntry1] : 1.  SLE:  consisting of arthritis, nasal ulcers, Raynauds, alopecia, rash, and nephritis.  Rash was present during late 2014 and 2015, although during the late summer 2015, it wasn't particularly active. However, the rash was quite active in the spring, summer, and into the fall 2016 despite her medical regimen. It was  most prominent on the left arm and back.  Into 2017 the rash quieted down with the use of topical steroids. In 2018 it was intermittently active, but in late 2018/early 2019 the rash was quite active on the face, back, and hands.  She received an injection and topical therapy by her dermatologist.  The rash has remained active on her arms and thighs.  In addition, she has developed red spots (some tender) on the palmar aspects of her fingers. \par 2.  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  She has been treated with CellCept and remained on 2500 mg daily. She has had low-grade proteinuria. A 24 hour urine was to be collected in November 2016.  I was always concerned she had smoldering LN activity.  In the spring 2017 she stopped the CellCept on her own and then developed a major flare with an increase in creatinine to 2.5 with a urinary Pr/Cr of 5.  She was put back on CellCept and prednisone 40 mg/d.  The biopsy from late September 2017 demonstrated a class IV-G (A and C) lesion with 44% of the glomeruli sclerosed.  In addition, she had tubular atrophy and interstitial fibrosis estimated at about 50% (Activity: 10/24; Chronicity: 9/12).  It was decided despite the chronicity to try pulse steroids. Her creatinine went up but came back down to the high 2's. It dropped further to the low 2's. She was not seen from Dec 2017 until early Mar 2018.  She was doing relatively well clinically.  Lab tests were in order however. With a slight increase in furosemide, the edema improved. The creatinine has come down, albeit not normal. Given her chronic serologic activity and proteinuria, a repeat biopsy is in order.  She had a lot of chronicity on her last biopsy. A repeat biopsy was discussed to determine whether activity persists and whether a new therapeutic approach should be taken. \par 3.  Lower extremity edema:  she had an ECHO and was told it was OK.  No back pain.  The edema came and went, but it has been diuretic responsive.  Her regiment of furosemide alternating with hydrochlorothiazide was switched to torsemide. She then went off all diuretics in early November 2017. \par 4.  Chronic CellCept use:  tolerating without GI symptoms.\par 5.  Chronic Plaquenil use: no ocular symptoms\par 6.  Vitamin D deficiency\par 7.  Ocular migraine:  episode in late summer 2015\par 8.  URI\par 9.  Knee pain\par 10.  Angioedema:  episodes in the fall 2015 involving the lips and eyes prompted an evaluation of allergy.  No allergen was identified.  \par 12.  Dysesthesias in the legs: etiology unclear, but this symptom began in early 2016.\par 13.  Muscle cramps: she feels is related to diuretic use.  She cut down by using HCTZ, but this has not been an effective diuretic. \par 14.  Dry eyes: using topicals\par 15.  Hypertension:  remains elevated.  She was seen by GYN in the late spring 2017, her her BP was 150/100 as it was on her June 28, 2017 visit. Additional therapy was warranted. Into 2019 her BP remained high. \par 16.  Shingles:  outbreak on the right hemithorax end of October 2017 responsive to Valtrex. \par 17.  Hyperkalemia: she had high potassium's, but it was unclear if they were a result of difficulty drawing blood or from nephritis and/or valsartan.  The valsartan was stopped.  She started another what sounds like ARB (probably eprosartan).\par 18.  GERD: evaluated by CHEL Flores, including an endoscopy.  A variety of drugs failed.  She was referred for a motility evaluation.  \par 19.  Gout:  episode of podagra left foot in August 2018.  She received prednisone with initial relief; however, a recurrence required another course.  No hx of kidney stones, tophi. She then experienced a similar episode in the right great toe necessitating more steroids.  Uloric was prescribed but not taken. \par 20.  Rash: onset of rash left buttock in Oct 2018; etiology unclear but did not seem like shingles\par 21.  Headache: HA relieved with Tylenol during the early part of March 2019.  BPs have not been high enough to explain the HA. \par \par Meds\par furosemide 40 mg/d  \par prednisone 2.5 mg/d  \par CellCept 2500 mg/d in divided doses\par amlodipine 10 mg\par irbesartan 300 mg \par Bystolic 10 mg/d\par Plaquenil 400 mg/d\par Vit D 2000 IU/d\par \par \par Vaccines\par PNVX 3/24/2008\par PNVX 1/7/2013 ([id number]; [id number])\par PNVX 23  2/14/19  ([id number]; 8/2/20)\par Prevnar 13 valent 2/3/2015 ([id number]; exp 3/16)\par Quadrivalent fluzone  9/29/2015 ( AA; exp 6/2016)\par

## 2019-04-15 NOTE — REVIEW OF SYSTEMS
[Lower Ext Edema] : lower extremity edema [As Noted in HPI] : as noted in HPI [Negative] : Heme/Lymph

## 2019-04-15 NOTE — HISTORY OF PRESENT ILLNESS
[FreeTextEntry1] : 1.  SLE:  consisting of arthritis, nasal ulcers, Raynauds, alopecia, rash, and nephritis.  Rash was present during late 2014 and 2015, although during the late summer 2015, it wasn't particularly active. However, the rash was quite active in the spring, summer, and into the fall 2016 despite her medical regimen. It was  most prominent on the left arm and back.  Into 2017 the rash quieted down with the use of topical steroids. In 2018 it was intermittently active, but in late 2018/early 2019 the rash was quite active on the face, back, and hands.  She received an injection and topical therapy by her dermatologist.  The rash has remained active on her arms and thighs.  In addition, she has developed red spots (some tender) on the palmar aspects of her fingers. \par 2.  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  She has been treated with CellCept and remained on 2500 mg daily. She has had low-grade proteinuria. A 24 hour urine was to be collected in November 2016.  I was always concerned she had smoldering LN activity.  In the spring 2017 she stopped the CellCept on her own and then developed a major flare with an increase in creatinine to 2.5 with a urinary Pr/Cr of 5.  She was put back on CellCept and prednisone 40 mg/d.  The biopsy from late September 2017 demonstrated a class IV-G (A and C) lesion with 44% of the glomeruli sclerosed.  In addition, she had tubular atrophy and interstitial fibrosis estimated at about 50% (Activity: 10/24; Chronicity: 9/12).  It was decided despite the chronicity to try pulse steroids. Her creatinine went up but came back down to the high 2's. It dropped further to the low 2's. She was not seen from Dec 2017 until early Mar 2018.  She was doing relatively well clinically.  Lab tests were in order however. With a slight increase in furosemide, the edema improved. The creatinine has come down, albeit not normal. Given her chronic serologic activity and proteinuria, a repeat biopsy is in order.  She had a lot of chronicity on her last biopsy. A repeat biopsy was discussed to determine whether activity persists and whether a new therapeutic approach should be taken. \par 3.  Lower extremity edema:  she had an ECHO and was told it was OK.  No back pain.  The edema came and went, but it has been diuretic responsive.  Her regiment of furosemide alternating with hydrochlorothiazide was switched to torsemide. She then went off all diuretics in early November 2017. \par 4.  Chronic CellCept use:  tolerating without GI symptoms.\par 5.  Chronic Plaquenil use: no ocular symptoms\par 6.  Vitamin D deficiency\par 7.  Ocular migraine:  episode in late summer 2015\par 8.  URI\par 9.  Knee pain\par 10.  Angioedema:  episodes in the fall 2015 involving the lips and eyes prompted an evaluation of allergy.  No allergen was identified.  \par 12.  Dysesthesias in the legs: etiology unclear, but this symptom began in early 2016.\par 13.  Muscle cramps: she feels is related to diuretic use.  She cut down by using HCTZ, but this has not been an effective diuretic. \par 14.  Dry eyes: using topicals\par 15.  Hypertension:  remains elevated.  She was seen by GYN in the late spring 2017, her her BP was 150/100 as it was on her June 28, 2017 visit. Additional therapy was warranted. Into 2019 her BP remained high. \par 16.  Shingles:  outbreak on the right hemithorax end of October 2017 responsive to Valtrex. \par 17.  Hyperkalemia: she had high potassium's, but it was unclear if they were a result of difficulty drawing blood or from nephritis and/or valsartan.  The valsartan was stopped.  She started another what sounds like ARB (probably eprosartan).\par 18.  GERD: evaluated by CHEL Flores, including an endoscopy.  A variety of drugs failed.  She was referred for a motility evaluation.  \par 19.  Gout:  episode of podagra left foot in August 2018.  She received prednisone with initial relief; however, a recurrence required another course.  No hx of kidney stones, tophi. She then experienced a similar episode in the right great toe necessitating more steroids.  Uloric was prescribed but not taken. \par 20.  Rash: onset of rash left buttock in Oct 2018; etiology unclear but did not seem like shingles\par 21.  Headache: HA relieved with Tylenol during the early part of March 2019.  BPs have not been high enough to explain the HA. \par \par Meds\par furosemide 40 mg/d  \par prednisone 2.5 mg/d  \par CellCept 2500 mg/d in divided doses\par amlodipine 10 mg\par irbesartan 300 mg \par Bystolic 10 mg/d\par Plaquenil 400 mg/d\par Vit D 2000 IU/d\par \par \par Vaccines\par PNVX 3/24/2008\par PNVX 1/7/2013 ([id number]; [id number])\par PNVX 23  2/14/19  ([id number]; 8/2/20)\par Prevnar 13 valent 2/3/2015 ([id number]; exp 3/16)\par Quadrivalent fluzone  9/29/2015 ( AA; exp 6/2016)\par

## 2019-04-15 NOTE — PHYSICAL EXAM
[General Appearance - In No Acute Distress] : in no acute distress [General Appearance - Alert] : alert [General Appearance - Well Nourished] : well nourished [General Appearance - Well Developed] : well developed [Sclera] : the sclera and conjunctiva were normal [General Appearance - Well-Appearing] : healthy appearing [Extraocular Movements] : extraocular movements were intact [Strabismus] : no strabismus was seen [Examination Of The Oral Cavity] : the lips and gums were normal [Outer Ear] : the ears and nose were normal in appearance [Nasal Cavity] : the nasal mucosa and septum were normal [Oropharynx] : the oropharynx was normal [Neck Appearance] : the appearance of the neck was normal [Neck Cervical Mass (___cm)] : no neck mass was observed [Jugular Venous Distention Increased] : there was no jugular-venous distention [Thyroid Diffuse Enlargement] : the thyroid was not enlarged [Respiration, Rhythm And Depth] : normal respiratory rhythm and effort [Auscultation Breath Sounds / Voice Sounds] : lungs were clear to auscultation bilaterally [Exaggerated Use Of Accessory Muscles For Inspiration] : no accessory muscle use [Heart Sounds] : normal S1 and S2 [Heart Rate And Rhythm] : heart rate was normal and rhythm regular [Murmurs] : no murmurs [Heart Sounds Pericardial Friction Rub] : no pericardial rub [Heart Sounds Gallop] : no gallops [Edema] : there was no peripheral edema [Bowel Sounds] : normal bowel sounds [Veins - Varicosity Changes] : there were no varicosital changes [Abdomen Soft] : soft [Abdomen Tenderness] : non-tender [Cervical Lymph Nodes Enlarged Posterior Bilaterally] : posterior cervical [Abdomen Mass (___ Cm)] : no abdominal mass palpated [Cervical Lymph Nodes Enlarged Anterior Bilaterally] : anterior cervical [No CVA Tenderness] : no ~M costovertebral angle tenderness [Supraclavicular Lymph Nodes Enlarged Bilaterally] : supraclavicular [Abnormal Walk] : normal gait [No Spinal Tenderness] : no spinal tenderness [Motor Tone] : muscle strength and tone were normal [Nail Clubbing] : no clubbing  or cyanosis of the fingernails [Skin Color & Pigmentation] : normal skin color and pigmentation [Skin Turgor] : normal skin turgor [] : no rash [Sensation] : the sensory exam was normal to light touch and pinprick [Motor Exam] : the motor exam was normal [Oriented To Time, Place, And Person] : oriented to person, place, and time [Affect] : the affect was normal [Impaired Insight] : insight and judgment were intact [Mood] : the mood was normal [FreeTextEntry1] : active rash on arms; palmar aspects of fingers with "vasculopathic" red macular lesions, some tender

## 2019-04-15 NOTE — DISCUSSION/SUMMARY
[FreeTextEntry1] : \par \par Plan.\par 1.  Lab tests\par 2.  Ophthalmology consult\par 3.  Reduce prednisone 7.5 mg daily\par 4.  Consider increasing CellCept\par 5.  PNVX-13 valent given\par 6.  Return 4-6 weeks

## 2019-04-15 NOTE — ASSESSMENT
[FreeTextEntry1] : 1.  SLE:  consisting of arthritis, nasal ulcers, Raynauds, alopecia, rash, and nephritis.  Rash was present during late 2014 and 2015, although during the late summer 2015, it wasn't particularly active. However, the rash was quite active in the spring, summer, and into the fall 2016 despite her medical regimen. It was  most prominent on the left arm and back.  Into 2017 the rash quieted down with the use of topical steroids. In 2018 it was intermittently active, but in late 2018/early 2019 the rash was quite active on the face, back, and hands.  She received an injection and topical therapy by her dermatologist.  The rash has remained active on her arms and thighs.  In addition, she has developed red spots (some tender) on the palmar aspects of her fingers. \par 2.  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  She has been treated with CellCept and remained on 2500 mg daily. She has had low-grade proteinuria. A 24 hour urine was to be collected in November 2016.  I was always concerned she had smoldering LN activity.  In the spring 2017 she stopped the CellCept on her own and then developed a major flare with an increase in creatinine to 2.5 with a urinary Pr/Cr of 5.  She was put back on CellCept and prednisone 40 mg/d.  The biopsy from late September 2017 demonstrated a class IV-G (A and C) lesion with 44% of the glomeruli sclerosed.  In addition, she had tubular atrophy and interstitial fibrosis estimated at about 50% (Activity: 10/24; Chronicity: 9/12).  It was decided despite the chronicity to try pulse steroids. Her creatinine went up but came back down to the high 2's. It dropped further to the low 2's. She was not seen from Dec 2017 until early Mar 2018.  She was doing relatively well clinically.  Lab tests were in order however. With a slight increase in furosemide, the edema improved. The creatinine has come down, albeit not normal. Given her chronic serologic activity and proteinuria, a repeat biopsy is in order.  She had a lot of chronicity on her last biopsy. A repeat biopsy was discussed to determine whether activity persists and whether a new therapeutic approach should be taken. \par 3.  Lower extremity edema:  she had an ECHO and was told it was OK.  No back pain.  The edema came and went, but it has been diuretic responsive.  Her regiment of furosemide alternating with hydrochlorothiazide was switched to torsemide. She then went off all diuretics in early November 2017. \par 4.  Chronic CellCept use:  tolerating without GI symptoms.\par 5.  Chronic Plaquenil use: no ocular symptoms\par 6.  Vitamin D deficiency\par 7.  Ocular migraine:  episode in late summer 2015\par 8.  URI\par 9.  Knee pain\par 10.  Angioedema:  episodes in the fall 2015 involving the lips and eyes prompted an evaluation of allergy.  No allergen was identified.  \par 12.  Dysesthesias in the legs: etiology unclear, but this symptom began in early 2016.\par 13.  Muscle cramps: she feels is related to diuretic use.  She cut down by using HCTZ, but this has not been an effective diuretic. \par 14.  Dry eyes: using topicals\par 15.  Hypertension:  remains elevated.  She was seen by GYN in the late spring 2017, her her BP was 150/100 as it was on her June 28, 2017 visit. Additional therapy was warranted. Into 2019 her BP remained high. \par 16.  Shingles:  outbreak on the right hemithorax end of October 2017 responsive to Valtrex. \par 17.  Hyperkalemia: she had high potassium's, but it was unclear if they were a result of difficulty drawing blood or from nephritis and/or valsartan.  The valsartan was stopped.  She started another what sounds like ARB (probably eprosartan).\par 18.  GERD: evaluated by CHEL Flores, including an endoscopy.  A variety of drugs failed.  She was referred for a motility evaluation.  \par 19.  Gout:  episode of podagra left foot in August 2018.  She received prednisone with initial relief; however, a recurrence required another course.  No hx of kidney stones, tophi. She then experienced a similar episode in the right great toe necessitating more steroids.  Uloric was prescribed but not taken. \par 20.  Rash: onset of rash left buttock in Oct 2018; etiology unclear but did not seem like shingles\par 21.  Headache: HA relieved with Tylenol during the early part of March 2019.  BPs have not been high enough to explain the HA. \par \par Plan:\par 1.  Lab tests\par 2.  Take vit D on a regular basis\par 3.  Increase prednisone 20 mg/d\par 4.  Return 1 week\par 5.  Discussed a repeat biopsy given persistence of serologic activity and proteinuria\par \par \par

## 2019-04-15 NOTE — PHYSICAL EXAM
[General Appearance - Alert] : alert [General Appearance - In No Acute Distress] : in no acute distress [General Appearance - Well Nourished] : well nourished [General Appearance - Well Developed] : well developed [Sclera] : the sclera and conjunctiva were normal [General Appearance - Well-Appearing] : healthy appearing [Strabismus] : no strabismus was seen [Extraocular Movements] : extraocular movements were intact [Examination Of The Oral Cavity] : the lips and gums were normal [Outer Ear] : the ears and nose were normal in appearance [Nasal Cavity] : the nasal mucosa and septum were normal [Oropharynx] : the oropharynx was normal [Neck Appearance] : the appearance of the neck was normal [Neck Cervical Mass (___cm)] : no neck mass was observed [Jugular Venous Distention Increased] : there was no jugular-venous distention [Thyroid Diffuse Enlargement] : the thyroid was not enlarged [Respiration, Rhythm And Depth] : normal respiratory rhythm and effort [Exaggerated Use Of Accessory Muscles For Inspiration] : no accessory muscle use [Auscultation Breath Sounds / Voice Sounds] : lungs were clear to auscultation bilaterally [Heart Rate And Rhythm] : heart rate was normal and rhythm regular [Heart Sounds] : normal S1 and S2 [Heart Sounds Gallop] : no gallops [Heart Sounds Pericardial Friction Rub] : no pericardial rub [Murmurs] : no murmurs [Edema] : there was no peripheral edema [Veins - Varicosity Changes] : there were no varicosital changes [Bowel Sounds] : normal bowel sounds [Abdomen Soft] : soft [Abdomen Tenderness] : non-tender [Abdomen Mass (___ Cm)] : no abdominal mass palpated [Cervical Lymph Nodes Enlarged Posterior Bilaterally] : posterior cervical [No CVA Tenderness] : no ~M costovertebral angle tenderness [Supraclavicular Lymph Nodes Enlarged Bilaterally] : supraclavicular [Cervical Lymph Nodes Enlarged Anterior Bilaterally] : anterior cervical [Abnormal Walk] : normal gait [No Spinal Tenderness] : no spinal tenderness [Motor Tone] : muscle strength and tone were normal [Nail Clubbing] : no clubbing  or cyanosis of the fingernails [Skin Color & Pigmentation] : normal skin color and pigmentation [Skin Turgor] : normal skin turgor [] : no rash [Sensation] : the sensory exam was normal to light touch and pinprick [Motor Exam] : the motor exam was normal [Oriented To Time, Place, And Person] : oriented to person, place, and time [Impaired Insight] : insight and judgment were intact [Affect] : the affect was normal [Mood] : the mood was normal [FreeTextEntry1] : active rash on arms; palmar aspects of fingers with "vasculopathic" red macular lesions, some tender

## 2019-04-16 LAB
B2 GLYCOPROT1 AB SER QL: POSITIVE
CARDIOLIPIN AB SER IA-ACNC: POSITIVE
DSDNA AB SER-ACNC: 83 IU/ML
MPO AB + PR3 PNL SER: NORMAL

## 2019-04-19 LAB
B2 GLYCOPROT1 IGA SERPL IA-ACNC: 70 SAU
CRYOCRIT SER SPUN WESTERGREN: 1 MM

## 2019-04-22 ENCOUNTER — APPOINTMENT (OUTPATIENT)
Dept: RHEUMATOLOGY | Facility: CLINIC | Age: 49
End: 2019-04-22
Payer: COMMERCIAL

## 2019-04-22 VITALS
WEIGHT: 230 LBS | BODY MASS INDEX: 34.86 KG/M2 | HEART RATE: 72 BPM | HEIGHT: 68 IN | OXYGEN SATURATION: 97 % | RESPIRATION RATE: 16 BRPM | TEMPERATURE: 98.5 F | SYSTOLIC BLOOD PRESSURE: 116 MMHG | DIASTOLIC BLOOD PRESSURE: 77 MMHG

## 2019-04-22 PROCEDURE — 99213 OFFICE O/P EST LOW 20 MIN: CPT

## 2019-05-01 ENCOUNTER — FORM ENCOUNTER (OUTPATIENT)
Age: 49
End: 2019-05-01

## 2019-05-02 ENCOUNTER — APPOINTMENT (OUTPATIENT)
Dept: ULTRASOUND IMAGING | Facility: HOSPITAL | Age: 49
End: 2019-05-02
Payer: COMMERCIAL

## 2019-05-02 ENCOUNTER — OUTPATIENT (OUTPATIENT)
Dept: OUTPATIENT SERVICES | Facility: HOSPITAL | Age: 49
LOS: 1 days | End: 2019-05-02
Payer: COMMERCIAL

## 2019-05-02 ENCOUNTER — RESULT REVIEW (OUTPATIENT)
Age: 49
End: 2019-05-02

## 2019-05-02 DIAGNOSIS — M32.14 GLOMERULAR DISEASE IN SYSTEMIC LUPUS ERYTHEMATOSUS: ICD-10-CM

## 2019-05-02 DIAGNOSIS — Z00.00 ENCOUNTER FOR GENERAL ADULT MEDICAL EXAMINATION WITHOUT ABNORMAL FINDINGS: ICD-10-CM

## 2019-05-02 DIAGNOSIS — D36.9 BENIGN NEOPLASM, UNSPECIFIED SITE: Chronic | ICD-10-CM

## 2019-05-02 PROCEDURE — 88346 IMFLUOR 1ST 1ANTB STAIN PX: CPT

## 2019-05-02 PROCEDURE — 76942 ECHO GUIDE FOR BIOPSY: CPT

## 2019-05-02 PROCEDURE — 88312 SPECIAL STAINS GROUP 1: CPT | Mod: 26

## 2019-05-02 PROCEDURE — 88348 ELECTRON MICROSCOPY DX: CPT

## 2019-05-02 PROCEDURE — 88305 TISSUE EXAM BY PATHOLOGIST: CPT

## 2019-05-02 PROCEDURE — 88305 TISSUE EXAM BY PATHOLOGIST: CPT | Mod: 26

## 2019-05-02 PROCEDURE — 76942 ECHO GUIDE FOR BIOPSY: CPT | Mod: 26

## 2019-05-02 PROCEDURE — 50200 RENAL BIOPSY PERQ: CPT | Mod: LT

## 2019-05-02 PROCEDURE — 88313 SPECIAL STAINS GROUP 2: CPT

## 2019-05-02 PROCEDURE — 88346 IMFLUOR 1ST 1ANTB STAIN PX: CPT | Mod: 26

## 2019-05-02 PROCEDURE — 88348 ELECTRON MICROSCOPY DX: CPT | Mod: 26

## 2019-05-02 PROCEDURE — 88313 SPECIAL STAINS GROUP 2: CPT | Mod: 26

## 2019-05-02 PROCEDURE — 88350 IMFLUOR EA ADDL 1ANTB STN PX: CPT

## 2019-05-02 PROCEDURE — 88350 IMFLUOR EA ADDL 1ANTB STN PX: CPT | Mod: 26

## 2019-05-02 PROCEDURE — 50200 RENAL BIOPSY PERQ: CPT

## 2019-05-02 PROCEDURE — 88312 SPECIAL STAINS GROUP 1: CPT

## 2019-05-06 LAB — SURGICAL PATHOLOGY STUDY: SIGNIFICANT CHANGE UP

## 2019-05-08 ENCOUNTER — APPOINTMENT (OUTPATIENT)
Dept: RHEUMATOLOGY | Facility: CLINIC | Age: 49
End: 2019-05-08

## 2019-05-20 ENCOUNTER — APPOINTMENT (OUTPATIENT)
Dept: RHEUMATOLOGY | Facility: CLINIC | Age: 49
End: 2019-05-20
Payer: COMMERCIAL

## 2019-05-20 VITALS
OXYGEN SATURATION: 98 % | BODY MASS INDEX: 37.28 KG/M2 | TEMPERATURE: 98.6 F | DIASTOLIC BLOOD PRESSURE: 71 MMHG | HEART RATE: 76 BPM | HEIGHT: 68 IN | WEIGHT: 246 LBS | SYSTOLIC BLOOD PRESSURE: 103 MMHG

## 2019-05-20 PROCEDURE — 99214 OFFICE O/P EST MOD 30 MIN: CPT

## 2019-05-20 NOTE — ASSESSMENT
[FreeTextEntry1] : 1.  SLE:  consisting of arthritis, nasal ulcers, Raynauds, alopecia, rash, and nephritis.  Rash was present during late 2014 and 2015, although during the late summer 2015, it wasn't particularly active. However, the rash was quite active in the spring, summer, and into the fall 2016 despite her medical regimen. It was  most prominent on the left arm and back.  Into 2017 the rash quieted down with the use of topical steroids. In 2018 it was intermittently active, but in late 2018/early 2019 the rash was quite active on the face, back, and hands.  She received an injection and topical therapy by her dermatologist.  The rash has remained active on her arms and thighs.  In addition, she has developed red spots (some tender) on the palmar aspects of her fingers. Her rash on the arms started to scale/improve. \par 2.  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  She has been treated with CellCept and remained on 2500 mg daily. She has had low-grade proteinuria. A 24 hour urine was to be collected in November 2016.  I was always concerned she had smoldering LN activity.  In the spring 2017 she stopped the CellCept on her own and then developed a major flare with an increase in creatinine to 2.5 with a urinary Pr/Cr of 5.  She was put back on CellCept and prednisone 40 mg/d.  The biopsy from late September 2017 demonstrated a class IV-G (A and C) lesion with 44% of the glomeruli sclerosed.  In addition, she had tubular atrophy and interstitial fibrosis estimated at about 50% (Activity: 10/24; Chronicity: 9/12).  It was decided despite the chronicity to try pulse steroids. Her creatinine went up but came back down to the high 2's. It dropped further to the low 2's. She was not seen from Dec 2017 until early Mar 2018.  She was doing relatively well clinically.  Lab tests were in order however. With a slight increase in furosemide, the edema improved. The creatinine has come down, albeit not normal. Given her chronic serologic activity and proteinuria, a repeat biopsy is in order.  She had a lot of chronicity on her last biopsy. A repeat biopsy was discussed to determine whether activity persists and whether a new therapeutic approach should be taken. Based on labs there seems to be a mixed picture, and without a kidney biopsy the activity of her kidney cannot be discerned, although she will no doubt have a lot of damage. The biopsy, which was performed in the spring 2019, failed to show activity; however, there was damage.  Therefore, I can't justify changing therapies. \par 3.  Lower extremity edema:  she had an ECHO and was told it was OK.  No back pain.  The edema came and went, but it has been diuretic responsive.  Her regimen of furosemide alternating with hydrochlorothiazide was switched to torsemide. She then went off all diuretics in early November 2017. The edema returned in the spring of 2019 perhaps because of an increase in prednisone. \par 4.  Chronic CellCept use:  tolerating without GI symptoms.\par 5.  Chronic Plaquenil use: no ocular symptoms\par 6.  Vitamin D deficiency\par 7.  Ocular migraine:  episode in late summer 2015\par 8.  URI\par 9.  Knee pain\par 10.  Angioedema:  episodes in the fall 2015 involving the lips and eyes prompted an evaluation of allergy.  No allergen was identified.  \par 12.  Dysesthesias in the legs: etiology unclear, but this symptom began in early 2016.\par 13.  Muscle cramps: she feels is related to diuretic use.  She cut down by using HCTZ, but this has not been an effective diuretic. \par 14.  Dry eyes: using topicals\par 15.  Hypertension:  remains elevated.  She was seen by GYN in the late spring 2017, her her BP was 150/100 as it was on her June 28, 2017 visit. Additional therapy was warranted. Into 2019 her BP remained high. \par 16.  Shingles:  outbreak on the right hemithorax end of October 2017 responsive to Valtrex. \par 17.  Hyperkalemia: she had high potassium's, but it was unclear if they were a result of difficulty drawing blood or from nephritis and/or valsartan.  The valsartan was stopped.  She started another what sounds like ARB (probably eprosartan).\par 18.  GERD: evaluated by CHEL Flores, including an endoscopy.  A variety of drugs failed.  She was referred for a motility evaluation.  \par 19.  Gout:  episode of podagra left foot in August 2018.  She received prednisone with initial relief; however, a recurrence required another course.  No hx of kidney stones, tophi. She then experienced a similar episode in the right great toe necessitating more steroids.  Uloric was prescribed but not taken. \par 20.  Rash: onset of rash left buttock in Oct 2018; etiology unclear but did not seem like shingles.  This rash returned in the spring 2019.  She used topical steroids. \par 21.  Headache: HA relieved with Tylenol during the early part of March 2019.  BPs have not been high enough to explain the HA. \par 22.  Anemia\par \par Plan:\par 1.  Lab tests\par 2.  Take vit D on a regular basis\par 3.  Reduce prednisone 15 mg/d\par 4.  Return 1 week\par 5.  Increase furosemide to 60 mg/d\par 6.  Dermatology if lesion on buttock doesn't improve\par \par \par

## 2019-05-20 NOTE — HISTORY OF PRESENT ILLNESS
[FreeTextEntry1] : 1.  SLE:  consisting of arthritis, nasal ulcers, Raynauds, alopecia, rash, and nephritis.  Rash was present during late 2014 and 2015, although during the late summer 2015, it wasn't particularly active. However, the rash was quite active in the spring, summer, and into the fall 2016 despite her medical regimen. It was  most prominent on the left arm and back.  Into 2017 the rash quieted down with the use of topical steroids. In 2018 it was intermittently active, but in late 2018/early 2019 the rash was quite active on the face, back, and hands.  She received an injection and topical therapy by her dermatologist.  The rash has remained active on her arms and thighs.  In addition, she has developed red spots (some tender) on the palmar aspects of her fingers. Her rash on the arms started to scale/improve. \par 2.  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  She has been treated with CellCept and remained on 2500 mg daily. She has had low-grade proteinuria. A 24 hour urine was to be collected in November 2016.  I was always concerned she had smoldering LN activity.  In the spring 2017 she stopped the CellCept on her own and then developed a major flare with an increase in creatinine to 2.5 with a urinary Pr/Cr of 5.  She was put back on CellCept and prednisone 40 mg/d.  The biopsy from late September 2017 demonstrated a class IV-G (A and C) lesion with 44% of the glomeruli sclerosed.  In addition, she had tubular atrophy and interstitial fibrosis estimated at about 50% (Activity: 10/24; Chronicity: 9/12).  It was decided despite the chronicity to try pulse steroids. Her creatinine went up but came back down to the high 2's. It dropped further to the low 2's. She was not seen from Dec 2017 until early Mar 2018.  She was doing relatively well clinically.  Lab tests were in order however. With a slight increase in furosemide, the edema improved. The creatinine has come down, albeit not normal. Given her chronic serologic activity and proteinuria, a repeat biopsy is in order.  She had a lot of chronicity on her last biopsy. A repeat biopsy was discussed to determine whether activity persists and whether a new therapeutic approach should be taken. Based on labs there seems to be a mixed picture, and without a kidney biopsy the activity of her kidney cannot be discerned, although she will no doubt have a lot of damage. The biopsy, which was performed in the spring 2019, failed to show activity; however, there was damage.  Therefore, I can't justify changing therapies. \par 3.  Lower extremity edema:  she had an ECHO and was told it was OK.  No back pain.  The edema came and went, but it has been diuretic responsive.  Her regimen of furosemide alternating with hydrochlorothiazide was switched to torsemide. She then went off all diuretics in early November 2017. The edema returned in the spring of 2019 perhaps because of an increase in prednisone. \par 4.  Chronic CellCept use:  tolerating without GI symptoms.\par 5.  Chronic Plaquenil use: no ocular symptoms\par 6.  Vitamin D deficiency\par 7.  Ocular migraine:  episode in late summer 2015\par 8.  URI\par 9.  Knee pain\par 10.  Angioedema:  episodes in the fall 2015 involving the lips and eyes prompted an evaluation of allergy.  No allergen was identified.  \par 12.  Dysesthesias in the legs: etiology unclear, but this symptom began in early 2016.\par 13.  Muscle cramps: she feels is related to diuretic use.  She cut down by using HCTZ, but this has not been an effective diuretic. \par 14.  Dry eyes: using topicals\par 15.  Hypertension:  remains elevated.  She was seen by GYN in the late spring 2017, her her BP was 150/100 as it was on her June 28, 2017 visit. Additional therapy was warranted. Into 2019 her BP remained high. \par 16.  Shingles:  outbreak on the right hemithorax end of October 2017 responsive to Valtrex. \par 17.  Hyperkalemia: she had high potassium's, but it was unclear if they were a result of difficulty drawing blood or from nephritis and/or valsartan.  The valsartan was stopped.  She started another what sounds like ARB (probably eprosartan).\par 18.  GERD: evaluated by CHEL Flores, including an endoscopy.  A variety of drugs failed.  She was referred for a motility evaluation.  \par 19.  Gout:  episode of podagra left foot in August 2018.  She received prednisone with initial relief; however, a recurrence required another course.  No hx of kidney stones, tophi. She then experienced a similar episode in the right great toe necessitating more steroids.  Uloric was prescribed but not taken. \par 20.  Rash: onset of rash left buttock in Oct 2018; etiology unclear but did not seem like shingles.  This rash returned in the spring 2019.  She used topical steroids. \par 21.  Headache: HA relieved with Tylenol during the early part of March 2019.  BPs have not been high enough to explain the HA. \par 22.  Anemia\par \par Meds\par furosemide 40 mg/d  \par prednisone 20 mg/d  \par CellCept 2500 mg/d in divided doses\par amlodipine 10 mg\par irbesartan 300 mg \par Bystolic 10 mg/d\par Plaquenil 400 mg/d\par Vit D 2000 IU/d\par \par Vaccines\par PNVX 3/24/2008\par PNVX 1/7/2013 ([id number]; 30Jan2015)\par PNVX 23  2/14/19  ([id number]; 8/2/20)\par Prevnar 13 valent 2/3/2015 ([id number]; exp 3/16)\par Quadrivalent fluzone  9/29/2015 ( AA; exp 6/2016)\par

## 2019-05-20 NOTE — PHYSICAL EXAM
[General Appearance - Alert] : alert [General Appearance - In No Acute Distress] : in no acute distress [General Appearance - Well Nourished] : well nourished [General Appearance - Well Developed] : well developed [General Appearance - Well-Appearing] : healthy appearing [Sclera] : the sclera and conjunctiva were normal [Extraocular Movements] : extraocular movements were intact [Strabismus] : no strabismus was seen [Outer Ear] : the ears and nose were normal in appearance [Examination Of The Oral Cavity] : the lips and gums were normal [Nasal Cavity] : the nasal mucosa and septum were normal [Oropharynx] : the oropharynx was normal [Neck Appearance] : the appearance of the neck was normal [Neck Cervical Mass (___cm)] : no neck mass was observed [Jugular Venous Distention Increased] : there was no jugular-venous distention [Thyroid Diffuse Enlargement] : the thyroid was not enlarged [Respiration, Rhythm And Depth] : normal respiratory rhythm and effort [Exaggerated Use Of Accessory Muscles For Inspiration] : no accessory muscle use [Auscultation Breath Sounds / Voice Sounds] : lungs were clear to auscultation bilaterally [Heart Rate And Rhythm] : heart rate was normal and rhythm regular [Heart Sounds] : normal S1 and S2 [Heart Sounds Gallop] : no gallops [Murmurs] : no murmurs [Heart Sounds Pericardial Friction Rub] : no pericardial rub [Edema] : there was no peripheral edema [Veins - Varicosity Changes] : there were no varicosital changes [Bowel Sounds] : normal bowel sounds [Abdomen Soft] : soft [Abdomen Tenderness] : non-tender [Abdomen Mass (___ Cm)] : no abdominal mass palpated [Cervical Lymph Nodes Enlarged Posterior Bilaterally] : posterior cervical [Cervical Lymph Nodes Enlarged Anterior Bilaterally] : anterior cervical [Supraclavicular Lymph Nodes Enlarged Bilaterally] : supraclavicular [No CVA Tenderness] : no ~M costovertebral angle tenderness [No Spinal Tenderness] : no spinal tenderness [Abnormal Walk] : normal gait [Nail Clubbing] : no clubbing  or cyanosis of the fingernails [Motor Tone] : muscle strength and tone were normal [Skin Color & Pigmentation] : normal skin color and pigmentation [Skin Turgor] : normal skin turgor [] : no rash [Sensation] : the sensory exam was normal to light touch and pinprick [Motor Exam] : the motor exam was normal [Oriented To Time, Place, And Person] : oriented to person, place, and time [Impaired Insight] : insight and judgment were intact [Affect] : the affect was normal [Mood] : the mood was normal [FreeTextEntry1] : rash on arms less active; palmar aspects of fingers with "vasculopathic" red macular lesions improved; indurated area left buttock with overlying white patches

## 2019-05-21 LAB
25(OH)D3 SERPL-MCNC: 9.4 NG/ML
ALBUMIN SERPL ELPH-MCNC: 3.4 G/DL
ALP BLD-CCNC: 113 U/L
ALT SERPL-CCNC: 13 U/L
ANION GAP SERPL CALC-SCNC: 10 MMOL/L
APPEARANCE: CLEAR
AST SERPL-CCNC: 14 U/L
BACTERIA: NEGATIVE
BASOPHILS # BLD AUTO: 0.03 K/UL
BASOPHILS NFR BLD AUTO: 0.3 %
BILIRUB SERPL-MCNC: 0.2 MG/DL
BILIRUBIN URINE: NEGATIVE
BLOOD URINE: ABNORMAL
BUN SERPL-MCNC: 21 MG/DL
C3 SERPL-MCNC: 62 MG/DL
C4 SERPL-MCNC: 3 MG/DL
CALCIUM SERPL-MCNC: 8.7 MG/DL
CHLORIDE SERPL-SCNC: 111 MMOL/L
CK SERPL-CCNC: 51 U/L
CO2 SERPL-SCNC: 18 MMOL/L
COLOR: YELLOW
CREAT SERPL-MCNC: 1.72 MG/DL
CREAT SPEC-SCNC: 244 MG/DL
CREAT/PROT UR: 0.5 RATIO
EOSINOPHIL # BLD AUTO: 0.07 K/UL
EOSINOPHIL NFR BLD AUTO: 0.6 %
GLUCOSE QUALITATIVE U: NEGATIVE
GLUCOSE SERPL-MCNC: 96 MG/DL
HCT VFR BLD CALC: 29.2 %
HGB BLD-MCNC: 9.6 G/DL
HYALINE CASTS: 0 /LPF
IMM GRANULOCYTES NFR BLD AUTO: 0.5 %
KETONES URINE: NEGATIVE
LEUKOCYTE ESTERASE URINE: ABNORMAL
LYMPHOCYTES # BLD AUTO: 2.77 K/UL
LYMPHOCYTES NFR BLD AUTO: 25.7 %
MAN DIFF?: NORMAL
MCHC RBC-ENTMCNC: 24.4 PG
MCHC RBC-ENTMCNC: 32.9 GM/DL
MCV RBC AUTO: 74.1 FL
MICROSCOPIC-UA: NORMAL
MONOCYTES # BLD AUTO: 1.1 K/UL
MONOCYTES NFR BLD AUTO: 10.2 %
NEUTROPHILS # BLD AUTO: 6.77 K/UL
NEUTROPHILS NFR BLD AUTO: 62.7 %
NITRITE URINE: NEGATIVE
PH URINE: 6
PLATELET # BLD AUTO: 177 K/UL
POTASSIUM SERPL-SCNC: 4.1 MMOL/L
PROT SERPL-MCNC: 6.7 G/DL
PROT UR-MCNC: 112 MG/DL
PROTEIN URINE: ABNORMAL
RBC # BLD: 3.94 M/UL
RBC # FLD: 20 %
RED BLOOD CELLS URINE: 12 /HPF
SODIUM SERPL-SCNC: 139 MMOL/L
SPECIFIC GRAVITY URINE: 1.02
SQUAMOUS EPITHELIAL CELLS: 4 /HPF
TSH SERPL-ACNC: 2.06 UIU/ML
UROBILINOGEN URINE: NORMAL
WBC # FLD AUTO: 10.79 K/UL
WHITE BLOOD CELLS URINE: 26 /HPF

## 2019-05-22 LAB
ENA RNP AB SER IA-ACNC: 0.4 AL
ENA SM AB SER IA-ACNC: 2.1 AL
ENA SS-A AB SER IA-ACNC: 3 AL
ENA SS-B AB SER IA-ACNC: <0.2 AL

## 2019-05-23 LAB — DSDNA AB SER-ACNC: 63 IU/ML

## 2019-08-12 ENCOUNTER — APPOINTMENT (OUTPATIENT)
Dept: RHEUMATOLOGY | Facility: CLINIC | Age: 49
End: 2019-08-12
Payer: COMMERCIAL

## 2019-08-12 VITALS
SYSTOLIC BLOOD PRESSURE: 135 MMHG | HEART RATE: 74 BPM | RESPIRATION RATE: 16 BRPM | BODY MASS INDEX: 37.44 KG/M2 | TEMPERATURE: 98.6 F | HEIGHT: 68 IN | OXYGEN SATURATION: 98 % | WEIGHT: 247 LBS | DIASTOLIC BLOOD PRESSURE: 86 MMHG

## 2019-08-12 LAB
IRON SATN MFR SERPL: 27 %
IRON SERPL-MCNC: 87 UG/DL
TIBC SERPL-MCNC: 327 UG/DL
UIBC SERPL-MCNC: 240 UG/DL

## 2019-08-12 PROCEDURE — 99214 OFFICE O/P EST MOD 30 MIN: CPT

## 2019-08-12 RX ORDER — FEBUXOSTAT 40 MG/1
40 TABLET ORAL DAILY
Qty: 90 | Refills: 3 | Status: COMPLETED | COMMUNITY
Start: 2018-10-30 | End: 2019-08-12

## 2019-08-12 RX ORDER — CHOLECALCIFEROL (VITAMIN D3) 1250 MCG
1.25 MG CAPSULE ORAL
Qty: 8 | Refills: 0 | Status: ACTIVE | COMMUNITY
Start: 2019-08-12 | End: 1900-01-01

## 2019-09-03 ENCOUNTER — APPOINTMENT (OUTPATIENT)
Dept: RHEUMATOLOGY | Facility: CLINIC | Age: 49
End: 2019-09-03
Payer: COMMERCIAL

## 2019-09-03 ENCOUNTER — LABORATORY RESULT (OUTPATIENT)
Age: 49
End: 2019-09-03

## 2019-09-03 VITALS
RESPIRATION RATE: 16 BRPM | HEART RATE: 76 BPM | BODY MASS INDEX: 37.44 KG/M2 | WEIGHT: 247 LBS | SYSTOLIC BLOOD PRESSURE: 101 MMHG | OXYGEN SATURATION: 98 % | TEMPERATURE: 98.6 F | HEIGHT: 68 IN | DIASTOLIC BLOOD PRESSURE: 73 MMHG

## 2019-09-03 PROCEDURE — 99214 OFFICE O/P EST MOD 30 MIN: CPT

## 2019-09-04 LAB
25(OH)D3 SERPL-MCNC: 13.3 NG/ML
ALBUMIN SERPL ELPH-MCNC: 3.8 G/DL
ALP BLD-CCNC: 91 U/L
ALT SERPL-CCNC: 15 U/L
ANION GAP SERPL CALC-SCNC: 13 MMOL/L
APPEARANCE: ABNORMAL
AST SERPL-CCNC: 16 U/L
BACTERIA: NEGATIVE
BASOPHILS # BLD AUTO: 0.03 K/UL
BASOPHILS NFR BLD AUTO: 0.3 %
BILIRUB SERPL-MCNC: 0.2 MG/DL
BILIRUBIN URINE: NEGATIVE
BLOOD URINE: NORMAL
BUN SERPL-MCNC: 29 MG/DL
C3 SERPL-MCNC: 61 MG/DL
C4 SERPL-MCNC: 5 MG/DL
CALCIUM SERPL-MCNC: 9.2 MG/DL
CHLORIDE SERPL-SCNC: 109 MMOL/L
CK SERPL-CCNC: 74 U/L
CO2 SERPL-SCNC: 15 MMOL/L
COLOR: NORMAL
CREAT SERPL-MCNC: 1.6 MG/DL
CREAT SPEC-SCNC: 126 MG/DL
CREAT/PROT UR: 0.6 RATIO
EOSINOPHIL # BLD AUTO: 0.06 K/UL
EOSINOPHIL NFR BLD AUTO: 0.6 %
GLUCOSE QUALITATIVE U: NEGATIVE
GLUCOSE SERPL-MCNC: 79 MG/DL
HCT VFR BLD CALC: 32.6 %
HGB BLD-MCNC: 10.5 G/DL
HYALINE CASTS: 1 /LPF
IMM GRANULOCYTES NFR BLD AUTO: 0.4 %
KETONES URINE: NEGATIVE
LEUKOCYTE ESTERASE URINE: ABNORMAL
LYMPHOCYTES # BLD AUTO: 1.16 K/UL
LYMPHOCYTES NFR BLD AUTO: 11 %
MAN DIFF?: NORMAL
MCHC RBC-ENTMCNC: 23.8 PG
MCHC RBC-ENTMCNC: 32.2 GM/DL
MCV RBC AUTO: 73.9 FL
MICROSCOPIC-UA: NORMAL
MONOCYTES # BLD AUTO: 0.56 K/UL
MONOCYTES NFR BLD AUTO: 5.3 %
NEUTROPHILS # BLD AUTO: 8.68 K/UL
NEUTROPHILS NFR BLD AUTO: 82.4 %
NITRITE URINE: NEGATIVE
PH URINE: 6
PLATELET # BLD AUTO: 170 K/UL
POTASSIUM SERPL-SCNC: 5.5 MMOL/L
PROT SERPL-MCNC: 7.7 G/DL
PROT UR-MCNC: 75 MG/DL
PROTEIN URINE: ABNORMAL
RBC # BLD: 4.41 M/UL
RBC # FLD: 21.4 %
RED BLOOD CELLS URINE: 1 /HPF
SODIUM SERPL-SCNC: 137 MMOL/L
SPECIFIC GRAVITY URINE: 1.02
SQUAMOUS EPITHELIAL CELLS: 5 /HPF
TSH SERPL-ACNC: 0.87 UIU/ML
URATE SERPL-MCNC: 10.3 MG/DL
URINE COMMENTS: NORMAL
UROBILINOGEN URINE: NORMAL
WBC # FLD AUTO: 10.53 K/UL
WHITE BLOOD CELLS URINE: 35 /HPF

## 2019-09-05 LAB — DSDNA AB SER-ACNC: 104 IU/ML

## 2019-11-20 ENCOUNTER — APPOINTMENT (OUTPATIENT)
Dept: RHEUMATOLOGY | Facility: CLINIC | Age: 49
End: 2019-11-20

## 2019-11-20 VITALS — DIASTOLIC BLOOD PRESSURE: 95 MMHG | RESPIRATION RATE: 16 BRPM | SYSTOLIC BLOOD PRESSURE: 138 MMHG | HEART RATE: 80 BPM

## 2019-11-20 DIAGNOSIS — M1A.0490 IDIOPATHIC CHRONIC GOUT, UNSPECIFIED HAND, W/OUT TOPHUS (TOPHI): ICD-10-CM

## 2019-11-20 NOTE — PHYSICAL EXAM
[General Appearance - Alert] : alert [General Appearance - In No Acute Distress] : in no acute distress [General Appearance - Well Nourished] : well nourished [General Appearance - Well Developed] : well developed [General Appearance - Well-Appearing] : healthy appearing [Sclera] : the sclera and conjunctiva were normal [Extraocular Movements] : extraocular movements were intact [Strabismus] : no strabismus was seen [Outer Ear] : the ears and nose were normal in appearance [Examination Of The Oral Cavity] : the lips and gums were normal [Nasal Cavity] : the nasal mucosa and septum were normal [Oropharynx] : the oropharynx was normal [Neck Appearance] : the appearance of the neck was normal [Neck Cervical Mass (___cm)] : no neck mass was observed [Jugular Venous Distention Increased] : there was no jugular-venous distention [Thyroid Diffuse Enlargement] : the thyroid was not enlarged [Respiration, Rhythm And Depth] : normal respiratory rhythm and effort [Exaggerated Use Of Accessory Muscles For Inspiration] : no accessory muscle use [Auscultation Breath Sounds / Voice Sounds] : lungs were clear to auscultation bilaterally [Heart Rate And Rhythm] : heart rate was normal and rhythm regular [Heart Sounds] : normal S1 and S2 [Heart Sounds Gallop] : no gallops [Murmurs] : no murmurs [Heart Sounds Pericardial Friction Rub] : no pericardial rub [Veins - Varicosity Changes] : there were no varicosital changes [Bowel Sounds] : normal bowel sounds [Abdomen Soft] : soft [Abdomen Tenderness] : non-tender [] : no hepato-splenomegaly [Abdomen Mass (___ Cm)] : no abdominal mass palpated [Cervical Lymph Nodes Enlarged Posterior Bilaterally] : posterior cervical [Cervical Lymph Nodes Enlarged Anterior Bilaterally] : anterior cervical [Supraclavicular Lymph Nodes Enlarged Bilaterally] : supraclavicular [No CVA Tenderness] : no ~M costovertebral angle tenderness [No Spinal Tenderness] : no spinal tenderness [Abnormal Walk] : normal gait [Nail Clubbing] : no clubbing  or cyanosis of the fingernails [Motor Tone] : muscle strength and tone were normal [Skin Color & Pigmentation] : normal skin color and pigmentation [Skin Turgor] : normal skin turgor [Sensation] : the sensory exam was normal to light touch and pinprick [Motor Exam] : the motor exam was normal [Oriented To Time, Place, And Person] : oriented to person, place, and time [Impaired Insight] : insight and judgment were intact [Affect] : the affect was normal [Mood] : the mood was normal [FreeTextEntry1] : maculopapular rash on arms, forearms, upper back-but inactive

## 2019-11-20 NOTE — HISTORY OF PRESENT ILLNESS
[FreeTextEntry1] : 1.  SLE:  consisting of arthritis, nasal ulcers, Raynauds, alopecia, rash, and nephritis.  Rash was present during late 2014 and 2015, although during the late summer 2015, it wasn't particularly active. However, the rash was quite active in the spring, summer, and into the fall 2016 despite her medical regimen. It was  most prominent on the left arm and back.  Into 2017 the rash quieted down with the use of topical steroids. In 2018 it was intermittently active, but in late 2018/early 2019 the rash was quite active on the face, back, and hands.  She received an injection and topical therapy by her dermatologist.  The rash has remained active on her arms and thighs.  In addition, she has developed red spots (some tender) on the palmar aspects of her fingers. Her rash on the arms started to scale/improve. Arthralgias more prominent in summer, which is what is happening in summer 2019. Fingers, elbows and knees. Buttock rash present in 5/19 faded, but now on arms, back. Pt not using anything topical. \par 2.  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  She has been treated with CellCept and remained on 2500 mg daily. She has had low-grade proteinuria. A 24 hour urine was to be collected in November 2016.  I was always concerned she had smoldering LN activity.  In the spring 2017 she stopped the CellCept on her own and then developed a major flare with an increase in creatinine to 2.5 with a urinary Pr/Cr of 5.  She was put back on CellCept and prednisone 40 mg/d.  The biopsy from late September 2017 demonstrated a class IV-G (A and C) lesion with 44% of the glomeruli sclerosed.  In addition, she had tubular atrophy and interstitial fibrosis estimated at about 50% (Activity: 10/24; Chronicity: 9/12).  It was decided despite the chronicity to try pulse steroids. Her creatinine went up but came back down to the high 2's. It dropped further to the low 2's. She was not seen from Dec 2017 until early Mar 2018.  She was doing relatively well clinically.  Lab tests were in order however. With a slight increase in furosemide, the edema improved. The creatinine has come down, albeit not normal. Given her chronic serologic activity and proteinuria, a repeat biopsy is in order.  She had a lot of chronicity on her last biopsy. A repeat biopsy was discussed to determine whether activity persists and whether a new therapeutic approach should be taken. Based on labs there seems to be a mixed picture, and without a kidney biopsy the activity of her kidney cannot be discerned, although she will no doubt have a lot of damage. The biopsy, which was performed in the spring 2019, failed to show activity; however, there was damage.  Therefore, I can't justify changing therapies. \par 3.  Lower extremity edema:  she had an ECHO and was told it was OK.  No back pain.  The edema came and went, but it has been diuretic responsive.  Her regimen of furosemide alternating with hydrochlorothiazide was switched to torsemide. She then went off all diuretics in early November 2017. The edema returned in the spring of 2019 perhaps because of an increase in prednisone. Pt could not tolerate furosemide 60mg, so went down to 40mg. \par 4.  Chronic CellCept use:  tolerating without GI symptoms.\par 5.  Chronic Plaquenil use: no ocular symptoms\par 6.  Vitamin D deficiency\par 7.  Ocular migraine:  episode in late summer 2015\par 8.  URI\par 9.  Knee pain\par 10.  Angioedema:  episodes in the fall 2015 involving the lips and eyes prompted an evaluation of allergy.  No allergen was identified.  \par 12.  Dysesthesias in the legs: etiology unclear, but this symptom began in early 2016.\par 13.  Muscle cramps: she feels is related to diuretic use.  She cut down by using HCTZ, but this has not been an effective diuretic. \par 14.  Dry eyes: using topicals\par 15.  Hypertension:  remains elevated.  She was seen by GYN in the late spring 2017, her her BP was 150/100 as it was on her June 28, 2017 visit. Additional therapy was warranted. Into 2019 her BP remained high. \par 16.  Shingles:  outbreak on the right hemithorax end of October 2017 responsive to Valtrex. \par 17.  Hyperkalemia: she had high potassium's, but it was unclear if they were a result of difficulty drawing blood or from nephritis and/or valsartan.  The valsartan was stopped.  She started another what sounds like ARB (probably eprosartan).\par 18.  GERD: evaluated by CHEL Flores, including an endoscopy.  A variety of drugs failed.  She was referred for a motility evaluation.  \par 19.  Gout:  episode of podagra left foot in August 2018.  She received prednisone with initial relief; however, a recurrence required another course.  No hx of kidney stones, tophi. She then experienced a similar episode in the right great toe necessitating more steroids.  Uloric was prescribed but not taken. In Sept 2019 she presented with 1 month of left 1st MTP pain and swelling.\par 20.  Rash: onset of rash left buttock in Oct 2018; etiology unclear but did not seem like shingles.  This rash returned in the spring 2019.  She used topical steroids. \par 21.  Headache: HA relieved with Tylenol during the early part of March 2019.  BPs have not been high enough to explain the HA. \par 22.  Anemia\par \par Meds\par Uloric 40 mg/d\par furosemide 40 mg/d  \par prednisone 10 mg/d  \par CellCept 2500 mg/d in divided doses\par amlodipine 10 mg\par irbesartan 300 mg \par Bystolic 10 mg/d\par Plaquenil 400 mg/d\par Vit D 50,000 IU/weekly\par \par Vaccines\par PNVX 3/24/2008\par PNVX 1/7/2013 ([id number]; 30Jan2015)\par PNVX 23  2/14/19  ([id number]; 8/2/20)\par Prevnar 13 valent 2/3/2015 ([id number]; exp 3/16)\par Quadrivalent fluzone  9/29/2015 ( AA; exp 6/2016)\par

## 2019-11-20 NOTE — ASSESSMENT
[FreeTextEntry1] : 1.  SLE:  consisting of arthritis, nasal ulcers, Raynauds, alopecia, rash, and nephritis.  Rash was present during late 2014 and 2015, although during the late summer 2015, it wasn't particularly active. However, the rash was quite active in the spring, summer, and into the fall 2016 despite her medical regimen. It was  most prominent on the left arm and back.  Into 2017 the rash quieted down with the use of topical steroids. In 2018 it was intermittently active, but in late 2018/early 2019 the rash was quite active on the face, back, and hands.  She received an injection and topical therapy by her dermatologist.  The rash has remained active on her arms and thighs.  In addition, she has developed red spots (some tender) on the palmar aspects of her fingers. Her rash on the arms started to scale/improve. Arthralgias more prominent in summer, which is what is happening in summer 2019. Fingers, elbows and knees. Buttock rash present in 5/19 faded, but now on arms, back. Pt not using anything topical. \par 2.  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  She has been treated with CellCept and remained on 2500 mg daily. She has had low-grade proteinuria. A 24 hour urine was to be collected in November 2016.  I was always concerned she had smoldering LN activity.  In the spring 2017 she stopped the CellCept on her own and then developed a major flare with an increase in creatinine to 2.5 with a urinary Pr/Cr of 5.  She was put back on CellCept and prednisone 40 mg/d.  The biopsy from late September 2017 demonstrated a class IV-G (A and C) lesion with 44% of the glomeruli sclerosed.  In addition, she had tubular atrophy and interstitial fibrosis estimated at about 50% (Activity: 10/24; Chronicity: 9/12).  It was decided despite the chronicity to try pulse steroids. Her creatinine went up but came back down to the high 2's. It dropped further to the low 2's. She was not seen from Dec 2017 until early Mar 2018.  She was doing relatively well clinically.  Lab tests were in order however. With a slight increase in furosemide, the edema improved. The creatinine has come down, albeit not normal. Given her chronic serologic activity and proteinuria, a repeat biopsy is in order.  She had a lot of chronicity on her last biopsy. A repeat biopsy was discussed to determine whether activity persists and whether a new therapeutic approach should be taken. Based on labs there seems to be a mixed picture, and without a kidney biopsy the activity of her kidney cannot be discerned, although she will no doubt have a lot of damage. The biopsy, which was performed in the spring 2019, failed to show activity; however, there was damage.  Therefore, I can't justify changing therapies. \par 3.  Lower extremity edema:  she had an ECHO and was told it was OK.  No back pain.  The edema came and went, but it has been diuretic responsive.  Her regimen of furosemide alternating with hydrochlorothiazide was switched to torsemide. She then went off all diuretics in early November 2017. The edema returned in the spring of 2019 perhaps because of an increase in prednisone. Pt could not tolerate furosemide 60mg, so went down to 40mg. \par 4.  Chronic CellCept use:  tolerating without GI symptoms.\par 5.  Chronic Plaquenil use: no ocular symptoms\par 6.  Vitamin D deficiency\par 7.  Ocular migraine:  episode in late summer 2015\par 8.  URI\par 9.  Knee pain\par 10.  Angioedema:  episodes in the fall 2015 involving the lips and eyes prompted an evaluation of allergy.  No allergen was identified.  \par 12.  Dysesthesias in the legs: etiology unclear, but this symptom began in early 2016.\par 13.  Muscle cramps: she feels is related to diuretic use.  She cut down by using HCTZ, but this has not been an effective diuretic. \par 14.  Dry eyes: using topicals\par 15.  Hypertension:  remains elevated.  She was seen by GYN in the late spring 2017, her her BP was 150/100 as it was on her June 28, 2017 visit. Additional therapy was warranted. Into 2019 her BP remained high. \par 16.  Shingles:  outbreak on the right hemithorax end of October 2017 responsive to Valtrex. \par 17.  Hyperkalemia: she had high potassium's, but it was unclear if they were a result of difficulty drawing blood or from nephritis and/or valsartan.  The valsartan was stopped.  She started another what sounds like ARB (probably eprosartan).\par 18.  GERD: evaluated by CHEL Flores, including an endoscopy.  A variety of drugs failed.  She was referred for a motility evaluation.  \par 19.  Gout:  episode of podagra left foot in August 2018.  She received prednisone with initial relief; however, a recurrence required another course.  No hx of kidney stones, tophi. She then experienced a similar episode in the right great toe necessitating more steroids.  Uloric was prescribed but not taken. In Sept 2019 she presented with 1 month of left 1st MTP pain and swelling.\par 20.  Rash: onset of rash left buttock in Oct 2018; etiology unclear but did not seem like shingles.  This rash returned in the spring 2019.  She used topical steroids. \par 21.  Headache: HA relieved with Tylenol during the early part of March 2019.  BPs have not been high enough to explain the HA. \par 22.  Anemia\par \par Plan:\par 1.  Lab tests\par 2.  Return 4-6 weeks\par \par \par \par

## 2019-11-21 LAB
25(OH)D3 SERPL-MCNC: 21.8 NG/ML
ALBUMIN SERPL ELPH-MCNC: 3.7 G/DL
ALP BLD-CCNC: 103 U/L
ALT SERPL-CCNC: 12 U/L
ANION GAP SERPL CALC-SCNC: 10 MMOL/L
APPEARANCE: ABNORMAL
AST SERPL-CCNC: 14 U/L
BACTERIA: NEGATIVE
BASOPHILS # BLD AUTO: 0.07 K/UL
BASOPHILS NFR BLD AUTO: 0.6 %
BILIRUB SERPL-MCNC: 0.2 MG/DL
BILIRUBIN URINE: NEGATIVE
BLOOD URINE: ABNORMAL
BUN SERPL-MCNC: 26 MG/DL
C3 SERPL-MCNC: 72 MG/DL
C4 SERPL-MCNC: 5 MG/DL
CALCIUM SERPL-MCNC: 9.3 MG/DL
CHLORIDE SERPL-SCNC: 108 MMOL/L
CK SERPL-CCNC: 47 U/L
CO2 SERPL-SCNC: 19 MMOL/L
COLOR: NORMAL
CREAT SERPL-MCNC: 1.65 MG/DL
CREAT SPEC-SCNC: 110 MG/DL
CREAT/PROT UR: 1 RATIO
DSDNA AB SER-ACNC: 99 IU/ML
ENA RNP AB SER IA-ACNC: 0.4 AL
ENA SM AB SER IA-ACNC: 2.2 AL
ENA SS-A AB SER IA-ACNC: 4 AL
ENA SS-B AB SER IA-ACNC: <0.2 AL
EOSINOPHIL # BLD AUTO: 0.05 K/UL
EOSINOPHIL NFR BLD AUTO: 0.4 %
GLUCOSE QUALITATIVE U: NEGATIVE
GLUCOSE SERPL-MCNC: 94 MG/DL
HCT VFR BLD CALC: 32 %
HGB BLD-MCNC: 10.4 G/DL
HYALINE CASTS: 3 /LPF
IMM GRANULOCYTES NFR BLD AUTO: 0.6 %
KETONES URINE: NEGATIVE
LEUKOCYTE ESTERASE URINE: NEGATIVE
LYMPHOCYTES # BLD AUTO: 1.57 K/UL
LYMPHOCYTES NFR BLD AUTO: 12.6 %
MAN DIFF?: NORMAL
MCHC RBC-ENTMCNC: 23.9 PG
MCHC RBC-ENTMCNC: 32.5 GM/DL
MCV RBC AUTO: 73.4 FL
MICROSCOPIC-UA: NORMAL
MONOCYTES # BLD AUTO: 1 K/UL
MONOCYTES NFR BLD AUTO: 8 %
NEUTROPHILS # BLD AUTO: 9.66 K/UL
NEUTROPHILS NFR BLD AUTO: 77.8 %
NITRITE URINE: NEGATIVE
PH URINE: 6
PLATELET # BLD AUTO: 184 K/UL
POTASSIUM SERPL-SCNC: 4.2 MMOL/L
PROT SERPL-MCNC: 7.5 G/DL
PROT UR-MCNC: 105 MG/DL
PROTEIN URINE: ABNORMAL
RBC # BLD: 4.36 M/UL
RBC # FLD: 20.2 %
RED BLOOD CELLS URINE: >720 /HPF
SODIUM SERPL-SCNC: 137 MMOL/L
SPECIFIC GRAVITY URINE: 1.02
SQUAMOUS EPITHELIAL CELLS: 10 /HPF
UROBILINOGEN URINE: NORMAL
WBC # FLD AUTO: 12.43 K/UL
WHITE BLOOD CELLS URINE: 3 /HPF

## 2019-11-26 LAB
ANA PAT FLD IF-IMP: ABNORMAL
ANA PATTERN: ABNORMAL
ANA SER IF-ACNC: ABNORMAL
ANA TITER: ABNORMAL

## 2020-01-24 ENCOUNTER — APPOINTMENT (OUTPATIENT)
Dept: RHEUMATOLOGY | Facility: CLINIC | Age: 50
End: 2020-01-24
Payer: COMMERCIAL

## 2020-01-24 ENCOUNTER — LABORATORY RESULT (OUTPATIENT)
Age: 50
End: 2020-01-24

## 2020-01-24 VITALS
HEART RATE: 87 BPM | TEMPERATURE: 97.8 F | DIASTOLIC BLOOD PRESSURE: 97 MMHG | HEIGHT: 68 IN | SYSTOLIC BLOOD PRESSURE: 143 MMHG

## 2020-01-24 PROCEDURE — 99214 OFFICE O/P EST MOD 30 MIN: CPT

## 2020-01-25 LAB
25(OH)D3 SERPL-MCNC: 21.6 NG/ML
ALBUMIN SERPL ELPH-MCNC: 3.8 G/DL
ALP BLD-CCNC: 102 U/L
ALT SERPL-CCNC: 12 U/L
ANION GAP SERPL CALC-SCNC: 14 MMOL/L
APPEARANCE: CLEAR
AST SERPL-CCNC: 13 U/L
BACTERIA: NEGATIVE
BASOPHILS # BLD AUTO: 0 K/UL
BASOPHILS NFR BLD AUTO: 0 %
BILIRUB SERPL-MCNC: 0.4 MG/DL
BILIRUBIN URINE: NEGATIVE
BLOOD URINE: NEGATIVE
BUN SERPL-MCNC: 21 MG/DL
C3 SERPL-MCNC: 66 MG/DL
C4 SERPL-MCNC: 6 MG/DL
CALCIUM SERPL-MCNC: 9.5 MG/DL
CHLORIDE SERPL-SCNC: 109 MMOL/L
CHOLEST SERPL-MCNC: 181 MG/DL
CHOLEST/HDLC SERPL: 3.3 RATIO
CK SERPL-CCNC: 42 U/L
CO2 SERPL-SCNC: 18 MMOL/L
COLOR: NORMAL
CREAT SERPL-MCNC: 1.5 MG/DL
CREAT SPEC-SCNC: 172 MG/DL
CREAT/PROT UR: 0.5 RATIO
EOSINOPHIL # BLD AUTO: 0 K/UL
EOSINOPHIL NFR BLD AUTO: 0 %
ESTIMATED AVERAGE GLUCOSE: 103 MG/DL
GLUCOSE QUALITATIVE U: NEGATIVE
GLUCOSE SERPL-MCNC: 85 MG/DL
HBA1C MFR BLD HPLC: 5.2 %
HCT VFR BLD CALC: 34.7 %
HDLC SERPL-MCNC: 56 MG/DL
HGB BLD-MCNC: 10.9 G/DL
HYALINE CASTS: 2 /LPF
KETONES URINE: NEGATIVE
LDLC SERPL CALC-MCNC: 95 MG/DL
LEUKOCYTE ESTERASE URINE: NEGATIVE
LYMPHOCYTES # BLD AUTO: 0.93 K/UL
LYMPHOCYTES NFR BLD AUTO: 5.3 %
MAN DIFF?: NORMAL
MCHC RBC-ENTMCNC: 23.5 PG
MCHC RBC-ENTMCNC: 31.4 GM/DL
MCV RBC AUTO: 74.9 FL
MICROSCOPIC-UA: NORMAL
MONOCYTES # BLD AUTO: 1.07 K/UL
MONOCYTES NFR BLD AUTO: 6.1 %
NEUTROPHILS # BLD AUTO: 14.88 K/UL
NEUTROPHILS NFR BLD AUTO: 85.1 %
NITRITE URINE: NEGATIVE
PH URINE: 6
PLATELET # BLD AUTO: 169 K/UL
POTASSIUM SERPL-SCNC: 4.6 MMOL/L
PROT SERPL-MCNC: 7.6 G/DL
PROT UR-MCNC: 85 MG/DL
PROTEIN URINE: ABNORMAL
RBC # BLD: 4.63 M/UL
RBC # FLD: 21.2 %
RED BLOOD CELLS URINE: 1 /HPF
SODIUM SERPL-SCNC: 141 MMOL/L
SPECIFIC GRAVITY URINE: 1.02
SQUAMOUS EPITHELIAL CELLS: 2 /HPF
TRIGL SERPL-MCNC: 153 MG/DL
TSH SERPL-ACNC: 1.13 UIU/ML
UROBILINOGEN URINE: NORMAL
WBC # FLD AUTO: 17.49 K/UL
WHITE BLOOD CELLS URINE: 7 /HPF

## 2020-01-26 LAB
ENA RNP AB SER IA-ACNC: 0.4 AL
ENA SM AB SER IA-ACNC: 1.7 AL
ENA SS-A AB SER IA-ACNC: 4.9 AL
ENA SS-B AB SER IA-ACNC: <0.2 AL

## 2020-01-28 LAB — DSDNA AB SER-ACNC: 126 IU/ML

## 2020-03-30 ENCOUNTER — APPOINTMENT (OUTPATIENT)
Dept: RHEUMATOLOGY | Facility: CLINIC | Age: 50
End: 2020-03-30
Payer: COMMERCIAL

## 2020-03-30 VITALS
TEMPERATURE: 98.1 F | DIASTOLIC BLOOD PRESSURE: 88 MMHG | WEIGHT: 255 LBS | HEART RATE: 74 BPM | BODY MASS INDEX: 38.65 KG/M2 | SYSTOLIC BLOOD PRESSURE: 131 MMHG | OXYGEN SATURATION: 98 % | HEIGHT: 68 IN | RESPIRATION RATE: 16 BRPM

## 2020-03-30 DIAGNOSIS — R68.84 JAW PAIN: ICD-10-CM

## 2020-03-30 LAB
ALBUMIN SERPL ELPH-MCNC: 4.3 G/DL
ALP BLD-CCNC: 90 U/L
ALT SERPL-CCNC: 12 U/L
ANION GAP SERPL CALC-SCNC: 12 MMOL/L
AST SERPL-CCNC: 15 U/L
BILIRUB SERPL-MCNC: 0.3 MG/DL
BUN SERPL-MCNC: 33 MG/DL
CALCIUM SERPL-MCNC: 9.3 MG/DL
CHLORIDE SERPL-SCNC: 110 MMOL/L
CK SERPL-CCNC: 51 U/L
CO2 SERPL-SCNC: 18 MMOL/L
CREAT SERPL-MCNC: 1.68 MG/DL
GLUCOSE SERPL-MCNC: 89 MG/DL
POTASSIUM SERPL-SCNC: 5.1 MMOL/L
PROT SERPL-MCNC: 7.9 G/DL
SODIUM SERPL-SCNC: 140 MMOL/L

## 2020-03-30 PROCEDURE — 99214 OFFICE O/P EST MOD 30 MIN: CPT

## 2020-03-31 LAB
BASOPHILS # BLD AUTO: 0.04 K/UL
BASOPHILS NFR BLD AUTO: 0.3 %
C3 SERPL-MCNC: 72 MG/DL
C4 SERPL-MCNC: 4 MG/DL
DSDNA AB SER-ACNC: 175 IU/ML
EOSINOPHIL # BLD AUTO: 0.01 K/UL
EOSINOPHIL NFR BLD AUTO: 0.1 %
HCT VFR BLD CALC: 34.9 %
HGB BLD-MCNC: 11.1 G/DL
IMM GRANULOCYTES NFR BLD AUTO: 0.7 %
LYMPHOCYTES # BLD AUTO: 1.15 K/UL
LYMPHOCYTES NFR BLD AUTO: 9.9 %
MAN DIFF?: NORMAL
MCHC RBC-ENTMCNC: 23.6 PG
MCHC RBC-ENTMCNC: 31.8 GM/DL
MCV RBC AUTO: 74.1 FL
MONOCYTES # BLD AUTO: 0.56 K/UL
MONOCYTES NFR BLD AUTO: 4.8 %
NEUTROPHILS # BLD AUTO: 9.72 K/UL
NEUTROPHILS NFR BLD AUTO: 84.2 %
PLATELET # BLD AUTO: 149 K/UL
RBC # BLD: 4.71 M/UL
RBC # FLD: 21.2 %
WBC # FLD AUTO: 11.56 K/UL

## 2020-04-10 LAB
MISCELLANEOUS TEST: NORMAL
PROC NAME: NORMAL

## 2020-04-25 ENCOUNTER — MESSAGE (OUTPATIENT)
Age: 50
End: 2020-04-25

## 2020-05-22 ENCOUNTER — APPOINTMENT (OUTPATIENT)
Dept: RHEUMATOLOGY | Facility: CLINIC | Age: 50
End: 2020-05-22

## 2020-05-22 ENCOUNTER — APPOINTMENT (OUTPATIENT)
Dept: DISASTER EMERGENCY | Facility: CLINIC | Age: 50
End: 2020-05-22

## 2020-05-22 LAB
25(OH)D3 SERPL-MCNC: 14.1 NG/ML
ALBUMIN SERPL ELPH-MCNC: 3.9 G/DL
ALP BLD-CCNC: 95 U/L
ALT SERPL-CCNC: 10 U/L
ANION GAP SERPL CALC-SCNC: 12 MMOL/L
APPEARANCE: CLEAR
AST SERPL-CCNC: 11 U/L
BACTERIA: NEGATIVE
BILIRUB SERPL-MCNC: 0.3 MG/DL
BILIRUBIN URINE: NEGATIVE
BLOOD URINE: ABNORMAL
BUN SERPL-MCNC: 38 MG/DL
CALCIUM SERPL-MCNC: 9.4 MG/DL
CHLORIDE SERPL-SCNC: 111 MMOL/L
CHOLEST SERPL-MCNC: 180 MG/DL
CHOLEST/HDLC SERPL: 3.6 RATIO
CK SERPL-CCNC: 41 U/L
CO2 SERPL-SCNC: 16 MMOL/L
COLOR: NORMAL
CREAT SERPL-MCNC: 1.77 MG/DL
CREAT SPEC-SCNC: 162 MG/DL
CREAT/PROT UR: 0.2 RATIO
GLUCOSE QUALITATIVE U: NEGATIVE
GLUCOSE SERPL-MCNC: 73 MG/DL
HDLC SERPL-MCNC: 50 MG/DL
HYALINE CASTS: 2 /LPF
KETONES URINE: NEGATIVE
LDLC SERPL CALC-MCNC: 90 MG/DL
LEUKOCYTE ESTERASE URINE: NEGATIVE
MICROSCOPIC-UA: NORMAL
NITRITE URINE: NEGATIVE
PH URINE: 6
POTASSIUM SERPL-SCNC: 4.3 MMOL/L
PROT SERPL-MCNC: 7.4 G/DL
PROT UR-MCNC: 32 MG/DL
PROTEIN URINE: ABNORMAL
RED BLOOD CELLS URINE: 4 /HPF
SODIUM SERPL-SCNC: 139 MMOL/L
SPECIFIC GRAVITY URINE: 1.02
SQUAMOUS EPITHELIAL CELLS: 2 /HPF
TRIGL SERPL-MCNC: 199 MG/DL
TSH SERPL-ACNC: 1.02 UIU/ML
URATE SERPL-MCNC: 9.4 MG/DL
UROBILINOGEN URINE: NORMAL
WHITE BLOOD CELLS URINE: 1 /HPF

## 2020-05-23 LAB
BASOPHILS # BLD AUTO: 0.05 K/UL
BASOPHILS NFR BLD AUTO: 0.3 %
C3 SERPL-MCNC: 69 MG/DL
C4 SERPL-MCNC: 6 MG/DL
ENA RNP AB SER IA-ACNC: 0.3 AL
ENA SM AB SER IA-ACNC: 1.2 AL
EOSINOPHIL # BLD AUTO: 0.06 K/UL
EOSINOPHIL NFR BLD AUTO: 0.4 %
ESTIMATED AVERAGE GLUCOSE: 100 MG/DL
HBA1C MFR BLD HPLC: 5.1 %
HCT VFR BLD CALC: 34.7 %
HGB BLD-MCNC: 11 G/DL
IMM GRANULOCYTES NFR BLD AUTO: 0.6 %
LYMPHOCYTES # BLD AUTO: 1.88 K/UL
LYMPHOCYTES NFR BLD AUTO: 11.1 %
MAN DIFF?: NORMAL
MCHC RBC-ENTMCNC: 24.3 PG
MCHC RBC-ENTMCNC: 31.7 GM/DL
MCV RBC AUTO: 76.8 FL
MONOCYTES # BLD AUTO: 1.31 K/UL
MONOCYTES NFR BLD AUTO: 7.7 %
NEUTROPHILS # BLD AUTO: 13.54 K/UL
NEUTROPHILS NFR BLD AUTO: 79.9 %
PLATELET # BLD AUTO: 165 K/UL
RBC # BLD: 4.52 M/UL
RBC # FLD: 21.2 %
SARS-COV-2 IGG SERPL IA-ACNC: 0.1 INDEX
SARS-COV-2 IGG SERPL QL IA: NEGATIVE
WBC # FLD AUTO: 16.95 K/UL

## 2020-05-27 LAB
ANA PAT FLD IF-IMP: ABNORMAL
ANA PATTERN: ABNORMAL
ANA SER IF-ACNC: ABNORMAL
ANA TITER: ABNORMAL
DSDNA AB SER-ACNC: 103 IU/ML

## 2020-05-29 LAB
MISCELLANEOUS TEST: NORMAL
PROC NAME: NORMAL

## 2020-06-01 ENCOUNTER — APPOINTMENT (OUTPATIENT)
Dept: RHEUMATOLOGY | Facility: CLINIC | Age: 50
End: 2020-06-01

## 2020-06-22 ENCOUNTER — LABORATORY RESULT (OUTPATIENT)
Age: 50
End: 2020-06-22

## 2020-06-22 ENCOUNTER — APPOINTMENT (OUTPATIENT)
Dept: RHEUMATOLOGY | Facility: CLINIC | Age: 50
End: 2020-06-22
Payer: COMMERCIAL

## 2020-06-22 VITALS
SYSTOLIC BLOOD PRESSURE: 103 MMHG | TEMPERATURE: 97.7 F | OXYGEN SATURATION: 98 % | DIASTOLIC BLOOD PRESSURE: 73 MMHG | BODY MASS INDEX: 40.62 KG/M2 | RESPIRATION RATE: 16 BRPM | HEIGHT: 68 IN | HEART RATE: 70 BPM | WEIGHT: 268 LBS

## 2020-06-22 DIAGNOSIS — M54.5 LOW BACK PAIN: ICD-10-CM

## 2020-06-22 DIAGNOSIS — M54.9 DORSALGIA, UNSPECIFIED: ICD-10-CM

## 2020-06-22 PROCEDURE — 99214 OFFICE O/P EST MOD 30 MIN: CPT

## 2020-06-23 LAB
25(OH)D3 SERPL-MCNC: 16.6 NG/ML
ALBUMIN SERPL ELPH-MCNC: 4 G/DL
ALP BLD-CCNC: 96 U/L
ALT SERPL-CCNC: 12 U/L
ANION GAP SERPL CALC-SCNC: 15 MMOL/L
APPEARANCE: CLEAR
AST SERPL-CCNC: 13 U/L
BACTERIA: NEGATIVE
BASOPHILS # BLD AUTO: 0 K/UL
BASOPHILS NFR BLD AUTO: 0 %
BILIRUB SERPL-MCNC: 0.3 MG/DL
BILIRUBIN URINE: NEGATIVE
BLOOD URINE: NEGATIVE
BUN SERPL-MCNC: 46 MG/DL
C3 SERPL-MCNC: 69 MG/DL
C4 SERPL-MCNC: 6 MG/DL
CALCIUM SERPL-MCNC: 9.2 MG/DL
CHLORIDE SERPL-SCNC: 110 MMOL/L
CK SERPL-CCNC: 69 U/L
CO2 SERPL-SCNC: 15 MMOL/L
COLOR: YELLOW
CREAT SERPL-MCNC: 2.13 MG/DL
CREAT SPEC-SCNC: 246 MG/DL
CREAT/PROT UR: 0.1 RATIO
DSDNA AB SER-ACNC: 98 IU/ML
EOSINOPHIL # BLD AUTO: 0 K/UL
EOSINOPHIL NFR BLD AUTO: 0 %
GLUCOSE QUALITATIVE U: NEGATIVE
GLUCOSE SERPL-MCNC: 95 MG/DL
HCT VFR BLD CALC: 34.3 %
HGB BLD-MCNC: 10.7 G/DL
HYALINE CASTS: 0 /LPF
IRON SATN MFR SERPL: 21 %
IRON SERPL-MCNC: 68 UG/DL
KETONES URINE: NEGATIVE
LEUKOCYTE ESTERASE URINE: ABNORMAL
LYMPHOCYTES # BLD AUTO: 1.01 K/UL
LYMPHOCYTES NFR BLD AUTO: 9 %
MAN DIFF?: NORMAL
MCHC RBC-ENTMCNC: 24.7 PG
MCHC RBC-ENTMCNC: 31.2 GM/DL
MCV RBC AUTO: 79.2 FL
MICROSCOPIC-UA: NORMAL
MONOCYTES # BLD AUTO: 0.51 K/UL
MONOCYTES NFR BLD AUTO: 4.5 %
NEUTROPHILS # BLD AUTO: 9.74 K/UL
NEUTROPHILS NFR BLD AUTO: 86.5 %
NITRITE URINE: NEGATIVE
PH URINE: 6
PLATELET # BLD AUTO: 152 K/UL
POTASSIUM SERPL-SCNC: 4.9 MMOL/L
PROT SERPL-MCNC: 7.3 G/DL
PROT UR-MCNC: 19 MG/DL
PROTEIN URINE: ABNORMAL
RBC # BLD: 4.33 M/UL
RBC # FLD: 20.5 %
RED BLOOD CELLS URINE: 3 /HPF
SODIUM SERPL-SCNC: 140 MMOL/L
SPECIFIC GRAVITY URINE: 1.02
SQUAMOUS EPITHELIAL CELLS: 3 /HPF
TIBC SERPL-MCNC: 317 UG/DL
UIBC SERPL-MCNC: 249 UG/DL
UROBILINOGEN URINE: NORMAL
WBC # FLD AUTO: 11.26 K/UL
WHITE BLOOD CELLS URINE: 21 /HPF

## 2020-07-01 LAB
MISCELLANEOUS TEST: NORMAL
PROC NAME: NORMAL

## 2020-09-24 NOTE — ED PROVIDER NOTE - DISCHARGE REVIEW MATERIAL PRESENTED
[facility] SUMMARY    Name:  Enrique Flores  MR#:   [id number]  :  1954  ACCOUNT #:  [de-identified]  ADMIT DATE:  2020  DISCHARGE DATE:  2020    CONSULTANT:  Darrell Horn MD, Cardiology. DISCHARGE DIAGNOSES:  1. Acute-on-chronic systolic and diastolic congestive heart failure. 2.  Severe cardiomyopathy. 3.  Severe hypothyroidism. 4.  Medication noncompliance. 5.  Paranoid schizophrenia, history of.  6.  Chronic hepatitis. 7.  Debility and weakness. 8.  Below-the-knee amputation. HOSPITAL COURSE:  This is a 74-year-old gentleman who, per his sister's report, has stopped taking his medications for some time. He has a history of thyroidectomy for thyroid cancer and had not been taking his Synthroid. He has a BKA that is in place and he has a prosthesis at home. He had not been able to see his primary care doctor for a few months. He had just stopped his medications, and by the time he reached the emergency room, he was in florid CHF, very short of breath. TSH was extremely elevated. So during this hospitalization, he is diuresed and is looking much better from that. He had a paracentesis for ascites. Anasarca is much improved. There is evidence for chronic kidney disease and his BUN and creatinine from admission were 19 and 1.89, now at 32 and 1.99; so with no notable change since diuresis, I suspect this may be his baseline. Ammonia level was normal.  His TSH was 199 on , recheck is 145. Glucose levels are within normal limits. He has been started back on oral Synthroid, which he is tolerating. His H and H is 10.4 and 31.8. Platelets and white count are normal.  BUN and creatinine are 32 and 1.99 as noted. His electrolytes are otherwise unremarkable. The patient has had an echocardiogram which shows an EF of 15%-20%, so he is supposed to be fitted for a LifeVest today for discharge.   He had a Lexiscan stress test that was abnormal, but negative for ischemia, and he has been followed by Cardiology here. He had a duplex of his lower extremity that showed no evidence of DVT in the right leg. He has a BKA on the left side. He is feeling much better. Mentally, he has been appropriate. There is no suicidal or homicidal ideation. He has been appropriate as far as conversing with staff and the physicians. He has been able to make his own decisions here. I spoke with his sister on Monday and updated her, she had concerns about him being able to take his medications at home. Of course, we cannot force him to take his medications, we hope that he will continue to take what he is doing now, which seems to be helping him quite a bit, and have followup as an outpatient with his regular doctors. She is hopeful for that as well. There is no acute psychiatric needs at this point in time. He certainly does not appear to be intent on endangering himself. We had a good discussion this morning where he told me that he is going to try to do his best, but if it is his time, it's his time and he cannot do anything about that as he knows he has advanced medical issues including heart failure. So with that, he is a debilitated  gentleman who speaks appropriate English. He is in no acute distress, sitting up in his chair today. Lungs are clear. Cardiac exam, regular rate and rhythm. Abdomen, benign. No ascites. Left lower and right lower extremity with 1+ edema. Temperature 97.8, blood pressure 95/50, pulse 68, respiratory rate 18, SaO2 is 98%. DISCHARGE MEDICATIONS:  His medications for discharge are going to be the followin. Lasix 20 mg daily. 2.  Isordil 5 mg half a tablet twice daily. 3.  Toprol-XL 25 mg daily. 4.  Synthroid 125 mcg daily. 5.  Risperdal 0.5 mg twice daily. Also, he can continue his calcium carbonate, vitamin B12, Depakote 3 tablets at night, TriCor 145 mg daily.   He is actually off the lisinopril now, so we need to make sure we stopped that. Otherwise, his blood pressure will go too low, so I am taking his lisinopril off his medication list.  Otherwise, his new medicines has been called into the pharmacy. He has followups to be arranged with [facility], Dr. Taylor Barrera in 2 weeks, and Dr. Arian Garner on 09/28. These are appropriate. He has been given instructions on heart failure, heart failure zones, and fluid restriction in [place] and counseling has been done since he has been here on heart failure and compliance with his medications. He is stable for release to his home and family with home health nursing. 45-minute discharge time. Cardiac diet with 2 g sodium restriction per day.       Pedro Arizmendi MD      [id number]  D:  09/23/2020 13:03  T:  09/23/2020 21:20  JOB #:  [id number] .

## 2020-09-28 ENCOUNTER — APPOINTMENT (OUTPATIENT)
Dept: RHEUMATOLOGY | Facility: CLINIC | Age: 50
End: 2020-09-28
Payer: COMMERCIAL

## 2020-09-28 VITALS
SYSTOLIC BLOOD PRESSURE: 117 MMHG | WEIGHT: 274 LBS | OXYGEN SATURATION: 98 % | TEMPERATURE: 97.8 F | BODY MASS INDEX: 41.52 KG/M2 | HEART RATE: 78 BPM | HEIGHT: 68 IN | DIASTOLIC BLOOD PRESSURE: 85 MMHG | RESPIRATION RATE: 16 BRPM

## 2020-09-28 DIAGNOSIS — E73.9 LACTOSE INTOLERANCE, UNSPECIFIED: ICD-10-CM

## 2020-09-28 PROCEDURE — 90686 IIV4 VACC NO PRSV 0.5 ML IM: CPT

## 2020-09-28 PROCEDURE — 99214 OFFICE O/P EST MOD 30 MIN: CPT | Mod: 25

## 2020-09-28 PROCEDURE — G0008: CPT

## 2020-09-29 LAB
ALBUMIN SERPL ELPH-MCNC: 4.2 G/DL
ALP BLD-CCNC: 101 U/L
ALT SERPL-CCNC: 15 U/L
ANION GAP SERPL CALC-SCNC: 10 MMOL/L
APPEARANCE: CLEAR
AST SERPL-CCNC: 13 U/L
BACTERIA: NEGATIVE
BASOPHILS # BLD AUTO: 0.04 K/UL
BASOPHILS NFR BLD AUTO: 0.3 %
BILIRUB SERPL-MCNC: 0.3 MG/DL
BILIRUBIN URINE: NEGATIVE
BLOOD URINE: NEGATIVE
BUN SERPL-MCNC: 40 MG/DL
C3 SERPL-MCNC: 74 MG/DL
C4 SERPL-MCNC: 5 MG/DL
CALCIUM SERPL-MCNC: 9.6 MG/DL
CHLORIDE SERPL-SCNC: 109 MMOL/L
CK SERPL-CCNC: 43 U/L
CO2 SERPL-SCNC: 18 MMOL/L
COLOR: NORMAL
CREAT SERPL-MCNC: 2.06 MG/DL
CREAT SPEC-SCNC: 91 MG/DL
CREAT/PROT UR: NORMAL RATIO
DSDNA AB SER-ACNC: 75 IU/ML
EOSINOPHIL # BLD AUTO: 0.03 K/UL
EOSINOPHIL NFR BLD AUTO: 0.2 %
GLUCOSE QUALITATIVE U: NEGATIVE
GLUCOSE SERPL-MCNC: 97 MG/DL
HCT VFR BLD CALC: 35.2 %
HGB BLD-MCNC: 11.5 G/DL
HYALINE CASTS: 3 /LPF
IMM GRANULOCYTES NFR BLD AUTO: 0.8 %
KETONES URINE: NEGATIVE
LEUKOCYTE ESTERASE URINE: ABNORMAL
LYMPHOCYTES # BLD AUTO: 1.25 K/UL
LYMPHOCYTES NFR BLD AUTO: 8.8 %
MAN DIFF?: NORMAL
MCHC RBC-ENTMCNC: 25.9 PG
MCHC RBC-ENTMCNC: 32.7 GM/DL
MCV RBC AUTO: 79.3 FL
MICROSCOPIC-UA: NORMAL
MONOCYTES # BLD AUTO: 0.84 K/UL
MONOCYTES NFR BLD AUTO: 5.9 %
NEUTROPHILS # BLD AUTO: 11.86 K/UL
NEUTROPHILS NFR BLD AUTO: 84 %
NITRITE URINE: NEGATIVE
PH URINE: 5.5
PLATELET # BLD AUTO: 193 K/UL
POTASSIUM SERPL-SCNC: 5.7 MMOL/L
PROT SERPL-MCNC: 7.5 G/DL
PROT UR-MCNC: <4 MG/DL
PROTEIN URINE: NEGATIVE
RBC # BLD: 4.44 M/UL
RBC # FLD: 18.7 %
RED BLOOD CELLS URINE: 0 /HPF
SODIUM SERPL-SCNC: 138 MMOL/L
SPECIFIC GRAVITY URINE: 1.01
SQUAMOUS EPITHELIAL CELLS: 2 /HPF
UROBILINOGEN URINE: NORMAL
WBC # FLD AUTO: 14.13 K/UL
WHITE BLOOD CELLS URINE: 4 /HPF

## 2020-10-08 LAB
MISCELLANEOUS TEST: NORMAL
PROC NAME: NORMAL

## 2020-10-11 LAB
ANION GAP SERPL CALC-SCNC: 12 MMOL/L
BUN SERPL-MCNC: 32 MG/DL
CALCIUM SERPL-MCNC: 9 MG/DL
CHLORIDE SERPL-SCNC: 109 MMOL/L
CO2 SERPL-SCNC: 20 MMOL/L
CREAT SERPL-MCNC: 1.67 MG/DL
GLUCOSE SERPL-MCNC: 75 MG/DL
POTASSIUM SERPL-SCNC: 4.7 MMOL/L
SODIUM SERPL-SCNC: 141 MMOL/L

## 2020-11-02 ENCOUNTER — APPOINTMENT (OUTPATIENT)
Dept: RHEUMATOLOGY | Facility: CLINIC | Age: 50
End: 2020-11-02
Payer: COMMERCIAL

## 2020-11-02 VITALS
SYSTOLIC BLOOD PRESSURE: 115 MMHG | TEMPERATURE: 97.1 F | OXYGEN SATURATION: 98 % | WEIGHT: 279 LBS | HEIGHT: 68 IN | BODY MASS INDEX: 42.28 KG/M2 | HEART RATE: 80 BPM | DIASTOLIC BLOOD PRESSURE: 83 MMHG | RESPIRATION RATE: 16 BRPM

## 2020-11-02 DIAGNOSIS — R25.2 CRAMP AND SPASM: ICD-10-CM

## 2020-11-02 LAB
25(OH)D3 SERPL-MCNC: 20.5 NG/ML
ALBUMIN SERPL ELPH-MCNC: 4.2 G/DL
ALP BLD-CCNC: 88 U/L
ALT SERPL-CCNC: 16 U/L
ANION GAP SERPL CALC-SCNC: 7 MMOL/L
AST SERPL-CCNC: 15 U/L
BASOPHILS # BLD AUTO: 0.04 K/UL
BASOPHILS NFR BLD AUTO: 0.3 %
BILIRUB SERPL-MCNC: 0.3 MG/DL
BUN SERPL-MCNC: 28 MG/DL
CALCIUM SERPL-MCNC: 9.1 MG/DL
CHLORIDE SERPL-SCNC: 114 MMOL/L
CK SERPL-CCNC: 59 U/L
CO2 SERPL-SCNC: 18 MMOL/L
CREAT SERPL-MCNC: 1.77 MG/DL
EOSINOPHIL # BLD AUTO: 0.03 K/UL
EOSINOPHIL NFR BLD AUTO: 0.2 %
GLUCOSE SERPL-MCNC: 98 MG/DL
HCT VFR BLD CALC: 35.8 %
HGB BLD-MCNC: 11.4 G/DL
IMM GRANULOCYTES NFR BLD AUTO: 0.7 %
LYMPHOCYTES # BLD AUTO: 0.94 K/UL
LYMPHOCYTES NFR BLD AUTO: 7.1 %
MAGNESIUM SERPL-MCNC: 1.8 MG/DL
MAN DIFF?: NORMAL
MCHC RBC-ENTMCNC: 25.5 PG
MCHC RBC-ENTMCNC: 31.8 GM/DL
MCV RBC AUTO: 80.1 FL
MONOCYTES # BLD AUTO: 0.47 K/UL
MONOCYTES NFR BLD AUTO: 3.5 %
NEUTROPHILS # BLD AUTO: 11.67 K/UL
NEUTROPHILS NFR BLD AUTO: 88.2 %
PLATELET # BLD AUTO: 145 K/UL
POTASSIUM SERPL-SCNC: 5.2 MMOL/L
PROT SERPL-MCNC: 7.3 G/DL
RBC # BLD: 4.47 M/UL
RBC # FLD: 19.1 %
SODIUM SERPL-SCNC: 139 MMOL/L
TSH SERPL-ACNC: 0.79 UIU/ML
WBC # FLD AUTO: 13.24 K/UL

## 2020-11-02 PROCEDURE — 99072 ADDL SUPL MATRL&STAF TM PHE: CPT

## 2020-11-02 PROCEDURE — 99214 OFFICE O/P EST MOD 30 MIN: CPT | Mod: 25

## 2020-11-03 LAB
APPEARANCE: ABNORMAL
BACTERIA: ABNORMAL
BILIRUBIN URINE: NEGATIVE
BLOOD URINE: ABNORMAL
C3 SERPL-MCNC: 82 MG/DL
C4 SERPL-MCNC: 8 MG/DL
COLOR: NORMAL
CREAT SPEC-SCNC: 116 MG/DL
CREAT/PROT UR: 0.2 RATIO
DSDNA AB SER-ACNC: 36 IU/ML
GLUCOSE QUALITATIVE U: NEGATIVE
HYALINE CASTS: 0 /LPF
KETONES URINE: NEGATIVE
LEUKOCYTE ESTERASE URINE: ABNORMAL
MICROSCOPIC-UA: NORMAL
NITRITE URINE: NEGATIVE
PH URINE: 6
PROT UR-MCNC: 20 MG/DL
PROTEIN URINE: NORMAL
RED BLOOD CELLS URINE: 7 /HPF
SPECIFIC GRAVITY URINE: 1.02
SQUAMOUS EPITHELIAL CELLS: 12 /HPF
UROBILINOGEN URINE: NORMAL
WHITE BLOOD CELLS URINE: 38 /HPF

## 2020-11-04 LAB
ENA SS-A AB SER IA-ACNC: 5.4 AL
ENA SS-B AB SER IA-ACNC: <0.2 AL

## 2020-11-12 LAB
MISCELLANEOUS TEST: NORMAL
PROC NAME: NORMAL

## 2020-11-18 ENCOUNTER — RX RENEWAL (OUTPATIENT)
Age: 50
End: 2020-11-18

## 2020-12-02 ENCOUNTER — RESULT REVIEW (OUTPATIENT)
Age: 50
End: 2020-12-02

## 2020-12-02 ENCOUNTER — FORM ENCOUNTER (OUTPATIENT)
Age: 50
End: 2020-12-02

## 2020-12-15 PROBLEM — Z87.09 HISTORY OF PHARYNGITIS: Status: RESOLVED | Noted: 2017-10-09 | Resolved: 2020-12-15

## 2020-12-17 ENCOUNTER — FORM ENCOUNTER (OUTPATIENT)
Age: 50
End: 2020-12-17

## 2020-12-18 ENCOUNTER — RESULT REVIEW (OUTPATIENT)
Age: 50
End: 2020-12-18

## 2020-12-22 ENCOUNTER — FORM ENCOUNTER (OUTPATIENT)
Age: 50
End: 2020-12-22

## 2021-01-04 ENCOUNTER — APPOINTMENT (OUTPATIENT)
Dept: RHEUMATOLOGY | Facility: CLINIC | Age: 51
End: 2021-01-04
Payer: COMMERCIAL

## 2021-01-04 VITALS
OXYGEN SATURATION: 98 % | DIASTOLIC BLOOD PRESSURE: 82 MMHG | RESPIRATION RATE: 16 BRPM | TEMPERATURE: 97.1 F | HEART RATE: 74 BPM | SYSTOLIC BLOOD PRESSURE: 124 MMHG | WEIGHT: 274 LBS | BODY MASS INDEX: 41.66 KG/M2

## 2021-01-04 DIAGNOSIS — M1A.0620 IDIOPATHIC CHRONIC GOUT, LEFT KNEE, W/OUT TOPHUS (TOPHI): ICD-10-CM

## 2021-01-04 LAB
APTT BLD: 30.7 SEC
BASOPHILS # BLD AUTO: 0.04 K/UL
BASOPHILS NFR BLD AUTO: 0.3 %
EOSINOPHIL # BLD AUTO: 0.02 K/UL
EOSINOPHIL NFR BLD AUTO: 0.2 %
HCT VFR BLD CALC: 36.5 %
HGB BLD-MCNC: 11.4 G/DL
IMM GRANULOCYTES NFR BLD AUTO: 0.6 %
INR PPP: 0.97 RATIO
LYMPHOCYTES # BLD AUTO: 1.09 K/UL
LYMPHOCYTES NFR BLD AUTO: 8.2 %
MAN DIFF?: NORMAL
MCHC RBC-ENTMCNC: 24.8 PG
MCHC RBC-ENTMCNC: 31.2 GM/DL
MCV RBC AUTO: 79.3 FL
MONOCYTES # BLD AUTO: 0.85 K/UL
MONOCYTES NFR BLD AUTO: 6.4 %
NEUTROPHILS # BLD AUTO: 11.14 K/UL
NEUTROPHILS NFR BLD AUTO: 84.3 %
PLATELET # BLD AUTO: 172 K/UL
PT BLD: 11.5 SEC
RBC # BLD: 4.6 M/UL
RBC # FLD: 18.1 %
WBC # FLD AUTO: 13.22 K/UL

## 2021-01-04 PROCEDURE — 99072 ADDL SUPL MATRL&STAF TM PHE: CPT

## 2021-01-04 PROCEDURE — 99214 OFFICE O/P EST MOD 30 MIN: CPT

## 2021-01-05 LAB
25(OH)D3 SERPL-MCNC: 27.8 NG/ML
ALBUMIN SERPL ELPH-MCNC: 4 G/DL
ALP BLD-CCNC: 91 U/L
ALT SERPL-CCNC: 17 U/L
ANION GAP SERPL CALC-SCNC: 9 MMOL/L
APPEARANCE: CLEAR
AST SERPL-CCNC: 13 U/L
BACTERIA: NEGATIVE
BILIRUB SERPL-MCNC: 0.2 MG/DL
BILIRUBIN URINE: NEGATIVE
BLOOD URINE: NEGATIVE
BUN SERPL-MCNC: 28 MG/DL
C3 SERPL-MCNC: 83 MG/DL
C4 SERPL-MCNC: 6 MG/DL
CALCIUM SERPL-MCNC: 8.9 MG/DL
CHLORIDE SERPL-SCNC: 110 MMOL/L
CO2 SERPL-SCNC: 19 MMOL/L
COLOR: NORMAL
CONFIRM: 32.7 SEC
CREAT SERPL-MCNC: 1.82 MG/DL
CREAT SPEC-SCNC: 152 MG/DL
CREAT/PROT UR: 0.1 RATIO
DRVVT IMM 1:2 NP PPP: NORMAL
DRVVT SCREEN TO CONFIRM RATIO: 1.01 RATIO
DSDNA AB SER-ACNC: 70 IU/ML
ENA RNP AB SER IA-ACNC: 0.4 AL
ENA SM AB SER IA-ACNC: 1.7 AL
ENA SS-A AB SER IA-ACNC: 5.4 AL
ENA SS-B AB SER IA-ACNC: <0.2 AL
FOLATE SERPL-MCNC: 9 NG/ML
GLUCOSE QUALITATIVE U: NEGATIVE
HYALINE CASTS: 1 /LPF
KETONES URINE: NEGATIVE
LEUKOCYTE ESTERASE URINE: ABNORMAL
M PROTEIN SPEC IFE-MCNC: NORMAL
MICROSCOPIC-UA: NORMAL
NITRITE URINE: NEGATIVE
PH URINE: 6
POTASSIUM SERPL-SCNC: 5 MMOL/L
PROT SERPL-MCNC: 7 G/DL
PROT UR-MCNC: 16 MG/DL
PROTEIN URINE: ABNORMAL
RED BLOOD CELLS URINE: 1 /HPF
SCREEN DRVVT: 36.8 SEC
SILICA CLOTTING TIME INTERPRETATION: NORMAL
SILICA CLOTTING TIME S/C: 0.97 RATIO
SODIUM SERPL-SCNC: 139 MMOL/L
SPECIFIC GRAVITY URINE: 1.02
SQUAMOUS EPITHELIAL CELLS: 2 /HPF
URATE SERPL-MCNC: 10.2 MG/DL
UROBILINOGEN URINE: NORMAL
VIT B12 SERPL-MCNC: 397 PG/ML
WHITE BLOOD CELLS URINE: 25 /HPF

## 2021-01-06 LAB
B2 GLYCOPROT1 IGA SERPL IA-ACNC: 79.4 SAU
B2 GLYCOPROT1 IGG SER-ACNC: <5 SGU
B2 GLYCOPROT1 IGM SER-ACNC: <5 SMU
CARDIOLIPIN IGM SER-MCNC: 6.6 GPL
CARDIOLIPIN IGM SER-MCNC: <5 MPL
DEPRECATED CARDIOLIPIN IGA SER: <5 APL

## 2021-02-12 ENCOUNTER — APPOINTMENT (OUTPATIENT)
Dept: MAMMOGRAPHY | Facility: IMAGING CENTER | Age: 51
End: 2021-02-12
Payer: COMMERCIAL

## 2021-02-12 ENCOUNTER — APPOINTMENT (OUTPATIENT)
Dept: ULTRASOUND IMAGING | Facility: IMAGING CENTER | Age: 51
End: 2021-02-12
Payer: COMMERCIAL

## 2021-02-12 ENCOUNTER — OUTPATIENT (OUTPATIENT)
Dept: OUTPATIENT SERVICES | Facility: HOSPITAL | Age: 51
LOS: 1 days | End: 2021-02-12
Payer: COMMERCIAL

## 2021-02-12 DIAGNOSIS — D36.9 BENIGN NEOPLASM, UNSPECIFIED SITE: Chronic | ICD-10-CM

## 2021-02-12 DIAGNOSIS — Z00.8 ENCOUNTER FOR OTHER GENERAL EXAMINATION: ICD-10-CM

## 2021-02-12 PROCEDURE — 77067 SCR MAMMO BI INCL CAD: CPT | Mod: 26

## 2021-02-12 PROCEDURE — 76641 ULTRASOUND BREAST COMPLETE: CPT | Mod: 26,50

## 2021-02-12 PROCEDURE — 77067 SCR MAMMO BI INCL CAD: CPT

## 2021-02-12 PROCEDURE — 77063 BREAST TOMOSYNTHESIS BI: CPT | Mod: 26

## 2021-02-12 PROCEDURE — 76641 ULTRASOUND BREAST COMPLETE: CPT

## 2021-02-12 PROCEDURE — 77063 BREAST TOMOSYNTHESIS BI: CPT

## 2021-02-24 ENCOUNTER — NON-APPOINTMENT (OUTPATIENT)
Age: 51
End: 2021-02-24

## 2021-03-01 ENCOUNTER — APPOINTMENT (OUTPATIENT)
Dept: RHEUMATOLOGY | Facility: CLINIC | Age: 51
End: 2021-03-01
Payer: COMMERCIAL

## 2021-03-01 ENCOUNTER — OUTPATIENT (OUTPATIENT)
Dept: OUTPATIENT SERVICES | Facility: HOSPITAL | Age: 51
LOS: 1 days | End: 2021-03-01
Payer: COMMERCIAL

## 2021-03-01 VITALS
TEMPERATURE: 96.8 F | SYSTOLIC BLOOD PRESSURE: 121 MMHG | RESPIRATION RATE: 16 BRPM | HEART RATE: 80 BPM | BODY MASS INDEX: 42.28 KG/M2 | HEIGHT: 68 IN | DIASTOLIC BLOOD PRESSURE: 86 MMHG | WEIGHT: 279 LBS

## 2021-03-01 VITALS
HEIGHT: 69 IN | OXYGEN SATURATION: 98 % | HEART RATE: 65 BPM | DIASTOLIC BLOOD PRESSURE: 80 MMHG | TEMPERATURE: 98 F | SYSTOLIC BLOOD PRESSURE: 122 MMHG | WEIGHT: 279.99 LBS | RESPIRATION RATE: 16 BRPM

## 2021-03-01 DIAGNOSIS — N87.9 DYSPLASIA OF CERVIX UTERI, UNSPECIFIED: ICD-10-CM

## 2021-03-01 DIAGNOSIS — I10 ESSENTIAL (PRIMARY) HYPERTENSION: ICD-10-CM

## 2021-03-01 DIAGNOSIS — M32.9 SYSTEMIC LUPUS ERYTHEMATOSUS, UNSPECIFIED: ICD-10-CM

## 2021-03-01 DIAGNOSIS — M10.9 GOUT, UNSPECIFIED: ICD-10-CM

## 2021-03-01 DIAGNOSIS — D36.9 BENIGN NEOPLASM, UNSPECIFIED SITE: Chronic | ICD-10-CM

## 2021-03-01 LAB
ANION GAP SERPL CALC-SCNC: 13 MMOL/L — SIGNIFICANT CHANGE UP (ref 7–14)
BASOPHILS # BLD AUTO: 0.04 K/UL
BASOPHILS NFR BLD AUTO: 0.3 %
BUN SERPL-MCNC: 29 MG/DL — HIGH (ref 7–23)
CALCIUM SERPL-MCNC: 9.2 MG/DL — SIGNIFICANT CHANGE UP (ref 8.4–10.5)
CHLORIDE SERPL-SCNC: 109 MMOL/L — HIGH (ref 98–107)
CO2 SERPL-SCNC: 17 MMOL/L — LOW (ref 22–31)
CREAT SERPL-MCNC: 1.61 MG/DL — HIGH (ref 0.5–1.3)
EOSINOPHIL # BLD AUTO: 0.04 K/UL
EOSINOPHIL NFR BLD AUTO: 0.3 %
GLUCOSE SERPL-MCNC: 83 MG/DL — SIGNIFICANT CHANGE UP (ref 70–99)
HCG SERPL-ACNC: <5 MIU/ML — SIGNIFICANT CHANGE UP
HCT VFR BLD CALC: 36.4 %
HGB BLD-MCNC: 11.7 G/DL
IMM GRANULOCYTES NFR BLD AUTO: 0.7 %
LYMPHOCYTES # BLD AUTO: 1.28 K/UL
LYMPHOCYTES NFR BLD AUTO: 9.6 %
MAN DIFF?: NORMAL
MCHC RBC-ENTMCNC: 25.8 PG
MCHC RBC-ENTMCNC: 32.1 GM/DL
MCV RBC AUTO: 80.2 FL
MONOCYTES # BLD AUTO: 0.89 K/UL
MONOCYTES NFR BLD AUTO: 6.7 %
NEUTROPHILS # BLD AUTO: 10.96 K/UL
NEUTROPHILS NFR BLD AUTO: 82.4 %
PLATELET # BLD AUTO: 162 K/UL
POTASSIUM SERPL-MCNC: 4.3 MMOL/L — SIGNIFICANT CHANGE UP (ref 3.5–5.3)
POTASSIUM SERPL-SCNC: 4.3 MMOL/L — SIGNIFICANT CHANGE UP (ref 3.5–5.3)
RBC # BLD: 4.54 M/UL
RBC # FLD: 18.3 %
SODIUM SERPL-SCNC: 139 MMOL/L — SIGNIFICANT CHANGE UP (ref 135–145)
WBC # FLD AUTO: 13.3 K/UL

## 2021-03-01 PROCEDURE — 99215 OFFICE O/P EST HI 40 MIN: CPT

## 2021-03-01 PROCEDURE — 99072 ADDL SUPL MATRL&STAF TM PHE: CPT

## 2021-03-01 PROCEDURE — 93010 ELECTROCARDIOGRAM REPORT: CPT

## 2021-03-01 RX ORDER — SODIUM CHLORIDE 9 MG/ML
1000 INJECTION, SOLUTION INTRAVENOUS
Refills: 0 | Status: DISCONTINUED | OUTPATIENT
Start: 2021-03-08 | End: 2021-03-22

## 2021-03-01 RX ORDER — ACETAMINOPHEN 500 MG
2 TABLET ORAL
Qty: 0 | Refills: 0 | DISCHARGE

## 2021-03-01 NOTE — H&P PST ADULT - HISTORY OF PRESENT ILLNESS
50 year old female presents to Presurgical testing with diagnosis of dysplasia of cervic scheduled for LEEP procedure. Patient reports that she had a abnormal pap smear in December 2020. Patient denies any symptoms   50 year old female presents to Presurgical testing with diagnosis of abnormal pap smear scheduled for LEEP procedure. Patient reports that she had a abnormal pap smear in December 2020. Patient denies any symptoms

## 2021-03-01 NOTE — H&P PST ADULT - NSICDXPROBLEM_GEN_ALL_CORE_FT
PROBLEM DIAGNOSES  Problem: Cervical dysplasia  Assessment and Plan: Patient tentatively scheduled for LEEP procedure on 3/8/21.  Pre-op instructions provided. Pt given verbal and written instructions with teach back on  pepcid. Pt verbalized understanding with return demonstration.   Covid testing scheduled prior to surgery as per patient.   JIM precautions, OR booking notified     Problem: HTN (hypertension)  Assessment and Plan: Patient instructed to take bystolic, amlodipine, and Irbasartan with a sip of water on the morning of procedure.     Problem: Gout  Assessment and Plan: Patient instructed to take febuxostat with a sip of water on the morning of procedure.     Problem: Lupus  Assessment and Plan: Patient instructed to take hydroxychoroquine, prednisone, with a sip of water on the morning of procedure.   Last note in chart

## 2021-03-01 NOTE — H&P PST ADULT - NSICDXPASTMEDICALHX_GEN_ALL_CORE_FT
PAST MEDICAL HISTORY:  DVT, lower extremity left leg 2012, resolved    Gout     Hypertension     Iron deficiency anemia     Lupus nephritis     Obesity     SLE (systemic lupus erythematosus)

## 2021-03-01 NOTE — H&P PST ADULT - NEGATIVE ENMT SYMPTOMS
no hearing difficulty/no ear pain/no tinnitus/no vertigo/no sinus symptoms/no nasal congestion/no nasal discharge/no nasal obstruction/no throat pain/no dysphagia

## 2021-03-01 NOTE — H&P PST ADULT - NSICDXFAMILYHX_GEN_ALL_CORE_FT
FAMILY HISTORY:  Family history of heart disease    Sibling  Still living? Yes, Estimated age: Age Unknown  Family history of breast cancer, Age at diagnosis: Age Unknown

## 2021-03-01 NOTE — H&P PST ADULT - NEGATIVE GENERAL GENITOURINARY SYMPTOMS
no hematuria/no renal colic/no flank pain L/no bladder infections/no dysuria/normal urinary frequency

## 2021-03-01 NOTE — H&P PST ADULT - ATTENDING COMMENTS
50 year old (+) Lupus E. On routine exam noted with  abnormal pap smear scheduled for LEEP procedure. We discussed at length all implications of LEEP as well as alternatives. I explained that her abnormal pap with unsatisfactory colposcopy, abnormal gross appearance of the cervix and her increased risk with immunosuppression by her lupus diagnosis plus HPV(+) High risk makes her more at risk of severe dysplasia, CIS or invasive cervical cancer and she agrees with LEEP of cervix. She verbalized understanding to all my explanations and signed consent after her questions were answered. She signed informed consent.

## 2021-03-02 LAB
25(OH)D3 SERPL-MCNC: 22.7 NG/ML
ALBUMIN SERPL ELPH-MCNC: 3.8 G/DL
ALP BLD-CCNC: 98 U/L
ALT SERPL-CCNC: 15 U/L
ANION GAP SERPL CALC-SCNC: 11 MMOL/L
APPEARANCE: CLEAR
AST SERPL-CCNC: 13 U/L
BACTERIA: NEGATIVE
BILIRUB SERPL-MCNC: 0.3 MG/DL
BILIRUBIN URINE: NEGATIVE
BLOOD URINE: NEGATIVE
BUN SERPL-MCNC: 26 MG/DL
C3 SERPL-MCNC: 75 MG/DL
C4 SERPL-MCNC: 7 MG/DL
CALCIUM SERPL-MCNC: 9.8 MG/DL
CHLORIDE SERPL-SCNC: 110 MMOL/L
CK SERPL-CCNC: 42 U/L
CO2 SERPL-SCNC: 19 MMOL/L
COLOR: NORMAL
CREAT SERPL-MCNC: 1.68 MG/DL
CREAT SPEC-SCNC: 108 MG/DL
CREAT/PROT UR: 0.1 RATIO
GLUCOSE QUALITATIVE U: NEGATIVE
HYALINE CASTS: 2 /LPF
KETONES URINE: NEGATIVE
LEUKOCYTE ESTERASE URINE: ABNORMAL
MICROSCOPIC-UA: NORMAL
NITRITE URINE: NEGATIVE
PH URINE: 6
POTASSIUM SERPL-SCNC: 5.2 MMOL/L
PROT SERPL-MCNC: 7.2 G/DL
PROT UR-MCNC: 11 MG/DL
PROTEIN URINE: ABNORMAL
RED BLOOD CELLS URINE: 2 /HPF
SODIUM SERPL-SCNC: 140 MMOL/L
SPECIFIC GRAVITY URINE: 1.02
SQUAMOUS EPITHELIAL CELLS: 3 /HPF
UROBILINOGEN URINE: NORMAL
WHITE BLOOD CELLS URINE: 9 /HPF

## 2021-03-05 ENCOUNTER — APPOINTMENT (OUTPATIENT)
Dept: DISASTER EMERGENCY | Facility: CLINIC | Age: 51
End: 2021-03-05

## 2021-03-05 DIAGNOSIS — Z01.818 ENCOUNTER FOR OTHER PREPROCEDURAL EXAMINATION: ICD-10-CM

## 2021-03-05 NOTE — ASU PATIENT PROFILE, ADULT - PMH
DVT, lower extremity  left leg 2012, resolved  Gout    Hypertension    Iron deficiency anemia    Lupus nephritis    Obesity    SLE (systemic lupus erythematosus)

## 2021-03-06 LAB — SARS-COV-2 N GENE NPH QL NAA+PROBE: NOT DETECTED

## 2021-03-07 ENCOUNTER — TRANSCRIPTION ENCOUNTER (OUTPATIENT)
Age: 51
End: 2021-03-07

## 2021-03-08 ENCOUNTER — OUTPATIENT (OUTPATIENT)
Dept: OUTPATIENT SERVICES | Facility: HOSPITAL | Age: 51
LOS: 1 days | Discharge: HOME | End: 2021-03-08
Payer: COMMERCIAL

## 2021-03-08 ENCOUNTER — RESULT REVIEW (OUTPATIENT)
Age: 51
End: 2021-03-08

## 2021-03-08 VITALS
HEART RATE: 69 BPM | WEIGHT: 279.99 LBS | DIASTOLIC BLOOD PRESSURE: 90 MMHG | HEIGHT: 69 IN | RESPIRATION RATE: 18 BRPM | TEMPERATURE: 98 F | SYSTOLIC BLOOD PRESSURE: 140 MMHG | OXYGEN SATURATION: 98 %

## 2021-03-08 VITALS
HEART RATE: 71 BPM | SYSTOLIC BLOOD PRESSURE: 128 MMHG | OXYGEN SATURATION: 99 % | RESPIRATION RATE: 17 BRPM | DIASTOLIC BLOOD PRESSURE: 92 MMHG

## 2021-03-08 DIAGNOSIS — D36.9 BENIGN NEOPLASM, UNSPECIFIED SITE: Chronic | ICD-10-CM

## 2021-03-08 DIAGNOSIS — N87.9 DYSPLASIA OF CERVIX UTERI, UNSPECIFIED: ICD-10-CM

## 2021-03-08 LAB — HCG UR QL: NEGATIVE — SIGNIFICANT CHANGE UP

## 2021-03-08 PROCEDURE — 88360 TUMOR IMMUNOHISTOCHEM/MANUAL: CPT | Mod: 26

## 2021-03-08 PROCEDURE — 88342 IMHCHEM/IMCYTCHM 1ST ANTB: CPT | Mod: 26,59

## 2021-03-08 PROCEDURE — 88305 TISSUE EXAM BY PATHOLOGIST: CPT | Mod: 26

## 2021-03-08 PROCEDURE — 88341 IMHCHEM/IMCYTCHM EA ADD ANTB: CPT | Mod: 26,59

## 2021-03-08 PROCEDURE — 88307 TISSUE EXAM BY PATHOLOGIST: CPT | Mod: 26

## 2021-03-08 RX ADMIN — SODIUM CHLORIDE 30 MILLILITER(S): 9 INJECTION, SOLUTION INTRAVENOUS at 10:08

## 2021-03-08 NOTE — ASU DISCHARGE PLAN (ADULT/PEDIATRIC) - ASU DC SPECIAL INSTRUCTIONSFT
Nothing in vagina (sex, tampons, douching) no heavy lifting (>10 lbs) for 2 weeks. Please return to ER or call your doctors office if pain is worsening, you are unable to keep down food or drink, fever >101, heavy vaginal bleeding. You are able to take a shower regularly.

## 2021-03-08 NOTE — ASU DISCHARGE PLAN (ADULT/PEDIATRIC) - CARE PROVIDER_API CALL
Sarahy Espino)  Obstetrics and Gynecology  [address] [place] [address]  Phone: [phone]  Fax: [phone]  Follow Up Time: 2 weeks

## 2021-03-08 NOTE — ASU PREOP CHECKLIST - COMMENTS
pt took famotidine, amlodipine, bystolic, irbesrtan, prednisone, and febuxostat today at 0800 with sips water

## 2021-03-08 NOTE — BRIEF OPERATIVE NOTE - NSICDXBRIEFPROCEDURE_GEN_ALL_CORE_FT
PROCEDURES:  Endocervical curettage 08-Mar-2021 12:19:50  Gertrudis Nur  LEEP 08-Mar-2021 12:19:38  Gertrudis Nur

## 2021-03-08 NOTE — ASU DISCHARGE PLAN (ADULT/PEDIATRIC) - NURSING INSTRUCTIONS
You received IV Tylenol for pain management at ________. Please DO NOT take any Tylenol (Acetaminophen) containing products, such as Vicodin, Percocet, Excedrin, and cold medications for the next 6 hours (until ________PM). DO NOT TAKE MORE THAN 3000 MG OF TYLENOL in a 24 hour period.   You received IV Toradol for pain management at _______. Please DO NOT take Motrin/Ibuprofen/Advil/Aleve/NSAIDs (Non-Steroidal Anti-Inflammatory Drugs) for the next 6 hours (until _______PM).

## 2021-03-08 NOTE — BRIEF OPERATIVE NOTE - OPERATION/FINDINGS
Large cervix with friable appearing area at 6 o'clock. Non staining area at 6, 7,and 8 o'clock. Large cervix with friable appearing area at 6 o'clock. Non staining area at 6, 7,and 8 o'clock.  bleeding noted in same area and needed cauterization with ball cautery and Monsel's solution on Fibrillar applied to the cervical site bed after collecting post leep ECC

## 2021-03-18 LAB — SURGICAL PATHOLOGY STUDY: SIGNIFICANT CHANGE UP

## 2021-04-02 ENCOUNTER — RX RENEWAL (OUTPATIENT)
Age: 51
End: 2021-04-02

## 2021-04-28 NOTE — ED PROVIDER NOTE - RESPIRATORY NEGATIVE STATEMENT, MLM
Patient c/o headache associated with hypertension     no chest pain, no cough, and no shortness of breath.

## 2021-05-03 ENCOUNTER — APPOINTMENT (OUTPATIENT)
Dept: RHEUMATOLOGY | Facility: CLINIC | Age: 51
End: 2021-05-03
Payer: COMMERCIAL

## 2021-05-03 VITALS
DIASTOLIC BLOOD PRESSURE: 83 MMHG | TEMPERATURE: 96.7 F | BODY MASS INDEX: 43.5 KG/M2 | WEIGHT: 287 LBS | OXYGEN SATURATION: 92 % | SYSTOLIC BLOOD PRESSURE: 130 MMHG | HEIGHT: 68 IN | HEART RATE: 69 BPM | RESPIRATION RATE: 18 BRPM

## 2021-05-03 PROCEDURE — 99072 ADDL SUPL MATRL&STAF TM PHE: CPT

## 2021-05-03 PROCEDURE — 96372 THER/PROPH/DIAG INJ SC/IM: CPT

## 2021-05-03 PROCEDURE — 99214 OFFICE O/P EST MOD 30 MIN: CPT | Mod: 25

## 2021-05-04 PROBLEM — M10.9 GOUT, UNSPECIFIED: Chronic | Status: ACTIVE | Noted: 2021-03-01

## 2021-05-04 PROBLEM — I82.409 ACUTE EMBOLISM AND THROMBOSIS OF UNSPECIFIED DEEP VEINS OF UNSPECIFIED LOWER EXTREMITY: Chronic | Status: ACTIVE | Noted: 2021-03-01

## 2021-05-04 LAB
25(OH)D3 SERPL-MCNC: 20.7 NG/ML
ALBUMIN SERPL ELPH-MCNC: 4.2 G/DL
ALP BLD-CCNC: 95 U/L
ALT SERPL-CCNC: 17 U/L
ANION GAP SERPL CALC-SCNC: 13 MMOL/L
APPEARANCE: CLEAR
AST SERPL-CCNC: 14 U/L
BACTERIA: ABNORMAL
BASOPHILS # BLD AUTO: 0.03 K/UL
BASOPHILS NFR BLD AUTO: 0.2 %
BILIRUB SERPL-MCNC: 0.2 MG/DL
BILIRUBIN URINE: NEGATIVE
BLOOD URINE: NEGATIVE
BUN SERPL-MCNC: 47 MG/DL
C3 SERPL-MCNC: 88 MG/DL
C4 SERPL-MCNC: 7 MG/DL
CALCIUM SERPL-MCNC: 9.3 MG/DL
CHLORIDE SERPL-SCNC: 109 MMOL/L
CK SERPL-CCNC: 45 U/L
CO2 SERPL-SCNC: 15 MMOL/L
COLOR: NORMAL
CREAT SERPL-MCNC: 2.27 MG/DL
CREAT SPEC-SCNC: 174 MG/DL
CREAT/PROT UR: 0.1 RATIO
DSDNA AB SER-ACNC: 50 IU/ML
EOSINOPHIL # BLD AUTO: 0.02 K/UL
EOSINOPHIL NFR BLD AUTO: 0.1 %
GLUCOSE QUALITATIVE U: NEGATIVE
HCT VFR BLD CALC: 39.1 %
HGB BLD-MCNC: 12.3 G/DL
HYALINE CASTS: 0 /LPF
IMM GRANULOCYTES NFR BLD AUTO: 0.7 %
KETONES URINE: NEGATIVE
LEUKOCYTE ESTERASE URINE: ABNORMAL
LYMPHOCYTES # BLD AUTO: 1.11 K/UL
LYMPHOCYTES NFR BLD AUTO: 7.5 %
MAN DIFF?: NORMAL
MCHC RBC-ENTMCNC: 25.3 PG
MCHC RBC-ENTMCNC: 31.5 GM/DL
MCV RBC AUTO: 80.5 FL
MICROSCOPIC-UA: NORMAL
MONOCYTES # BLD AUTO: 0.44 K/UL
MONOCYTES NFR BLD AUTO: 3 %
NEUTROPHILS # BLD AUTO: 13.09 K/UL
NEUTROPHILS NFR BLD AUTO: 88.5 %
NITRITE URINE: NEGATIVE
PH URINE: 6
PLATELET # BLD AUTO: 181 K/UL
POTASSIUM SERPL-SCNC: 5.3 MMOL/L
PROT SERPL-MCNC: 7.7 G/DL
PROT UR-MCNC: 12 MG/DL
PROTEIN URINE: ABNORMAL
RBC # BLD: 4.86 M/UL
RBC # FLD: 18.3 %
RED BLOOD CELLS URINE: 2 /HPF
SODIUM SERPL-SCNC: 137 MMOL/L
SPECIFIC GRAVITY URINE: >=1.03
SQUAMOUS EPITHELIAL CELLS: 2 /HPF
URATE SERPL-MCNC: 5.9 MG/DL
UROBILINOGEN URINE: NORMAL
WBC # FLD AUTO: 14.8 K/UL
WHITE BLOOD CELLS URINE: 22 /HPF

## 2021-05-05 LAB
ENA RNP AB SER IA-ACNC: 0.3 AL
ENA SM AB SER IA-ACNC: 1.7 AL
ENA SS-A AB SER IA-ACNC: 4.4 AL
ENA SS-B AB SER IA-ACNC: <0.2 AL

## 2021-06-02 ENCOUNTER — OUTPATIENT (OUTPATIENT)
Dept: OUTPATIENT SERVICES | Facility: HOSPITAL | Age: 51
LOS: 1 days | End: 2021-06-02
Payer: COMMERCIAL

## 2021-06-02 ENCOUNTER — APPOINTMENT (OUTPATIENT)
Dept: ULTRASOUND IMAGING | Facility: CLINIC | Age: 51
End: 2021-06-02
Payer: COMMERCIAL

## 2021-06-02 DIAGNOSIS — I99.9 UNSPECIFIED DISORDER OF CIRCULATORY SYSTEM: ICD-10-CM

## 2021-06-02 DIAGNOSIS — I82.491 ACUTE EMBOLISM AND THROMBOSIS OF OTHER SPECIFIED DEEP VEIN OF RIGHT LOWER EXTREMITY: ICD-10-CM

## 2021-06-02 DIAGNOSIS — D36.9 BENIGN NEOPLASM, UNSPECIFIED SITE: Chronic | ICD-10-CM

## 2021-06-02 PROCEDURE — 93971 EXTREMITY STUDY: CPT

## 2021-06-02 PROCEDURE — 93971 EXTREMITY STUDY: CPT | Mod: 26,RT

## 2021-06-11 LAB
INR PPP: 1.45 RATIO
PT BLD: 16.9 SEC

## 2021-06-14 ENCOUNTER — APPOINTMENT (OUTPATIENT)
Dept: RHEUMATOLOGY | Facility: CLINIC | Age: 51
End: 2021-06-14
Payer: COMMERCIAL

## 2021-06-14 ENCOUNTER — LABORATORY RESULT (OUTPATIENT)
Age: 51
End: 2021-06-14

## 2021-06-14 VITALS
WEIGHT: 282 LBS | SYSTOLIC BLOOD PRESSURE: 103 MMHG | HEIGHT: 68 IN | OXYGEN SATURATION: 95 % | BODY MASS INDEX: 42.74 KG/M2 | HEART RATE: 80 BPM | TEMPERATURE: 97.2 F | DIASTOLIC BLOOD PRESSURE: 68 MMHG | RESPIRATION RATE: 18 BRPM

## 2021-06-14 DIAGNOSIS — I82.409 ACUTE EMBOLISM AND THROMBOSIS OF UNSPECIFIED DEEP VEINS OF UNSPECIFIED LOWER EXTREMITY: ICD-10-CM

## 2021-06-14 DIAGNOSIS — M10.9 GOUT, UNSPECIFIED: ICD-10-CM

## 2021-06-14 PROCEDURE — 99215 OFFICE O/P EST HI 40 MIN: CPT

## 2021-06-14 PROCEDURE — 99072 ADDL SUPL MATRL&STAF TM PHE: CPT

## 2021-06-15 LAB
25(OH)D3 SERPL-MCNC: 18 NG/ML
ALBUMIN SERPL ELPH-MCNC: 3.8 G/DL
ALP BLD-CCNC: 85 U/L
ALT SERPL-CCNC: 19 U/L
ANION GAP SERPL CALC-SCNC: 13 MMOL/L
APPEARANCE: CLEAR
APTT BLD: 28.4 SEC
AST SERPL-CCNC: 11 U/L
BACTERIA: NEGATIVE
BASOPHILS # BLD AUTO: 0 K/UL
BASOPHILS NFR BLD AUTO: 0 %
BILIRUB SERPL-MCNC: 0.3 MG/DL
BILIRUBIN URINE: NEGATIVE
BLOOD URINE: NEGATIVE
BUN SERPL-MCNC: 46 MG/DL
C3 SERPL-MCNC: 77 MG/DL
C4 SERPL-MCNC: 11 MG/DL
CALCIUM SERPL-MCNC: 9.1 MG/DL
CHLORIDE SERPL-SCNC: 113 MMOL/L
CK SERPL-CCNC: 42 U/L
CO2 SERPL-SCNC: 15 MMOL/L
COLOR: NORMAL
CONFIRM: 36.5 SEC
CREAT SERPL-MCNC: 2.25 MG/DL
CREAT SPEC-SCNC: 143 MG/DL
CREAT/PROT UR: 0.1 RATIO
DRVVT IMM 1:2 NP PPP: NORMAL
DRVVT SCREEN TO CONFIRM RATIO: 0.93 RATIO
EOSINOPHIL # BLD AUTO: 0 K/UL
EOSINOPHIL NFR BLD AUTO: 0 %
GLUCOSE QUALITATIVE U: NEGATIVE
HCT VFR BLD CALC: 37 %
HGB BLD-MCNC: 11.8 G/DL
HYALINE CASTS: 0 /LPF
INR PPP: 1.36 RATIO
KETONES URINE: NEGATIVE
LEUKOCYTE ESTERASE URINE: ABNORMAL
LYMPHOCYTES # BLD AUTO: 1.33 K/UL
LYMPHOCYTES NFR BLD AUTO: 7.1 %
MAN DIFF?: NORMAL
MCHC RBC-ENTMCNC: 26 PG
MCHC RBC-ENTMCNC: 31.9 GM/DL
MCV RBC AUTO: 81.7 FL
MICROSCOPIC-UA: NORMAL
MONOCYTES # BLD AUTO: 1.5 K/UL
MONOCYTES NFR BLD AUTO: 8 %
NEUTROPHILS # BLD AUTO: 15.95 K/UL
NEUTROPHILS NFR BLD AUTO: 84.9 %
NITRITE URINE: NEGATIVE
PH URINE: 6
PLATELET # BLD AUTO: 172 K/UL
POTASSIUM SERPL-SCNC: 4.7 MMOL/L
PROT SERPL-MCNC: 6.4 G/DL
PROT UR-MCNC: 9 MG/DL
PROTEIN URINE: ABNORMAL
PT BLD: 16 SEC
RBC # BLD: 4.53 M/UL
RBC # FLD: 18.6 %
RED BLOOD CELLS URINE: 1 /HPF
SCREEN DRVVT: 45.6 SEC
SILICA CLOTTING TIME INTERPRETATION: NORMAL
SILICA CLOTTING TIME S/C: 0.85 RATIO
SODIUM SERPL-SCNC: 141 MMOL/L
SPECIFIC GRAVITY URINE: 1.02
SQUAMOUS EPITHELIAL CELLS: 6 /HPF
UROBILINOGEN URINE: NORMAL
WBC # FLD AUTO: 18.79 K/UL
WHITE BLOOD CELLS URINE: 9 /HPF

## 2021-06-16 LAB
B2 GLYCOPROT1 IGA SERPL IA-ACNC: 76 SAU
B2 GLYCOPROT1 IGG SER-ACNC: <5 SGU
B2 GLYCOPROT1 IGM SER-ACNC: <5 SMU
CARDIOLIPIN IGM SER-MCNC: 6.3 MPL
CARDIOLIPIN IGM SER-MCNC: <5 GPL
DEPRECATED CARDIOLIPIN IGA SER: <5 APL
DSDNA AB SER-ACNC: 39 IU/ML

## 2021-06-23 LAB
MISCELLANEOUS TEST: NORMAL
PROC NAME: NORMAL

## 2021-06-26 LAB
INR PPP: 2.93 RATIO
PT BLD: 32.8 SEC

## 2021-07-12 ENCOUNTER — APPOINTMENT (OUTPATIENT)
Dept: RHEUMATOLOGY | Facility: CLINIC | Age: 51
End: 2021-07-12
Payer: COMMERCIAL

## 2021-07-12 VITALS
DIASTOLIC BLOOD PRESSURE: 65 MMHG | WEIGHT: 278 LBS | OXYGEN SATURATION: 95 % | SYSTOLIC BLOOD PRESSURE: 106 MMHG | TEMPERATURE: 97 F | RESPIRATION RATE: 18 BRPM | BODY MASS INDEX: 42.13 KG/M2 | HEIGHT: 68 IN | HEART RATE: 79 BPM

## 2021-07-12 PROCEDURE — 99072 ADDL SUPL MATRL&STAF TM PHE: CPT

## 2021-07-12 PROCEDURE — 99215 OFFICE O/P EST HI 40 MIN: CPT

## 2021-07-13 LAB
ALBUMIN SERPL ELPH-MCNC: 4.1 G/DL
ALP BLD-CCNC: 77 U/L
ALT SERPL-CCNC: 27 U/L
ANION GAP SERPL CALC-SCNC: 13 MMOL/L
APPEARANCE: ABNORMAL
AST SERPL-CCNC: 15 U/L
BACTERIA: ABNORMAL
BASOPHILS # BLD AUTO: 0.03 K/UL
BASOPHILS NFR BLD AUTO: 0.2 %
BILIRUB SERPL-MCNC: 0.2 MG/DL
BILIRUBIN URINE: NEGATIVE
BLOOD URINE: NEGATIVE
BUN SERPL-MCNC: 44 MG/DL
C3 SERPL-MCNC: 86 MG/DL
C4 SERPL-MCNC: 16 MG/DL
CALCIUM SERPL-MCNC: 9.3 MG/DL
CHLORIDE SERPL-SCNC: 111 MMOL/L
CK SERPL-CCNC: 69 U/L
CO2 SERPL-SCNC: 13 MMOL/L
COLOR: NORMAL
CREAT SERPL-MCNC: 2.21 MG/DL
CREAT SPEC-SCNC: 103 MG/DL
CREAT/PROT UR: 0.1 RATIO
DSDNA AB SER-ACNC: 41 IU/ML
EOSINOPHIL # BLD AUTO: 0.01 K/UL
EOSINOPHIL NFR BLD AUTO: 0.1 %
GLUCOSE QUALITATIVE U: NEGATIVE
HCT VFR BLD CALC: 35.7 %
HGB BLD-MCNC: 11.7 G/DL
HYALINE CASTS: 3 /LPF
IMM GRANULOCYTES NFR BLD AUTO: 0.8 %
INR PPP: 3.24 RATIO
KETONES URINE: NEGATIVE
LEUKOCYTE ESTERASE URINE: ABNORMAL
LYMPHOCYTES # BLD AUTO: 1.2 K/UL
LYMPHOCYTES NFR BLD AUTO: 8 %
MAN DIFF?: NORMAL
MCHC RBC-ENTMCNC: 26.4 PG
MCHC RBC-ENTMCNC: 32.8 GM/DL
MCV RBC AUTO: 80.4 FL
MICROSCOPIC-UA: NORMAL
MONOCYTES # BLD AUTO: 0.68 K/UL
MONOCYTES NFR BLD AUTO: 4.5 %
NEUTROPHILS # BLD AUTO: 12.98 K/UL
NEUTROPHILS NFR BLD AUTO: 86.4 %
NITRITE URINE: NEGATIVE
PH URINE: 6
PLATELET # BLD AUTO: 217 K/UL
POTASSIUM SERPL-SCNC: 5.5 MMOL/L
PROT SERPL-MCNC: 7.2 G/DL
PROT UR-MCNC: 11 MG/DL
PROTEIN URINE: ABNORMAL
PT BLD: 36.5 SEC
RBC # BLD: 4.44 M/UL
RBC # FLD: 17.8 %
RED BLOOD CELLS URINE: 2 /HPF
SODIUM SERPL-SCNC: 137 MMOL/L
SPECIFIC GRAVITY URINE: 1.02
SQUAMOUS EPITHELIAL CELLS: 8 /HPF
URATE SERPL-MCNC: 6.1 MG/DL
UROBILINOGEN URINE: NORMAL
WBC # FLD AUTO: 15.02 K/UL
WHITE BLOOD CELLS URINE: 26 /HPF

## 2021-07-14 ENCOUNTER — APPOINTMENT (OUTPATIENT)
Dept: DERMATOLOGY | Facility: CLINIC | Age: 51
End: 2021-07-14
Payer: COMMERCIAL

## 2021-07-14 ENCOUNTER — LABORATORY RESULT (OUTPATIENT)
Age: 51
End: 2021-07-14

## 2021-07-14 ENCOUNTER — NON-APPOINTMENT (OUTPATIENT)
Age: 51
End: 2021-07-14

## 2021-07-14 VITALS — WEIGHT: 270 LBS | BODY MASS INDEX: 40.92 KG/M2 | HEIGHT: 68 IN

## 2021-07-14 DIAGNOSIS — Z12.83 ENCOUNTER FOR SCREENING FOR MALIGNANT NEOPLASM OF SKIN: ICD-10-CM

## 2021-07-14 DIAGNOSIS — B02.9 ZOSTER W/OUT COMPLICATIONS: ICD-10-CM

## 2021-07-14 DIAGNOSIS — L30.4 ERYTHEMA INTERTRIGO: ICD-10-CM

## 2021-07-14 PROCEDURE — 99072 ADDL SUPL MATRL&STAF TM PHE: CPT

## 2021-07-14 PROCEDURE — 99204 OFFICE O/P NEW MOD 45 MIN: CPT

## 2021-07-14 NOTE — PHYSICAL EXAM
[FreeTextEntry3] : AAOx3, pleasant, NAD, no visual lymphadenopathy\par hair, scalp, face, nose, eyelids, ears, lips, oropharynx, neck, chest, abdomen, back, right arm, left arm, nails, and hands examined with all normal findings,\par pertinent findings include:\par \par multiple benign nevi and lentigines\par punched out lesions in buttocks\par erythema in buttocks

## 2021-07-14 NOTE — HISTORY OF PRESENT ILLNESS
[FreeTextEntry1] : rash on buttocks [de-identified] : 51 year old female here for rash on buttocks. itchy and burning. present 1.5 months.

## 2021-07-14 NOTE — ASSESSMENT
[FreeTextEntry1] : 1) benign findings as above- education\par \par 2) h/o SLE\par \par 3) rash\par favor HSV and intertrigo\par education\par shingles in DDx; will treat for shingles although uncommon for 1.5 months and discussed low efficacy of valtrex after 48 hours\par culture taken\par valtrex 1g TID x7 days; SED\par mix triamcinolone 0.1% cream with ketoconazole cream BID x2 weeks; SED\par \par f.u 6 weeks

## 2021-07-16 ENCOUNTER — NON-APPOINTMENT (OUTPATIENT)
Age: 51
End: 2021-07-16

## 2021-07-21 LAB
MISCELLANEOUS TEST: NORMAL
PROC NAME: NORMAL

## 2021-07-22 ENCOUNTER — RX RENEWAL (OUTPATIENT)
Age: 51
End: 2021-07-22

## 2021-08-16 ENCOUNTER — APPOINTMENT (OUTPATIENT)
Dept: RHEUMATOLOGY | Facility: CLINIC | Age: 51
End: 2021-08-16
Payer: COMMERCIAL

## 2021-08-16 VITALS
HEART RATE: 77 BPM | WEIGHT: 280 LBS | OXYGEN SATURATION: 98 % | DIASTOLIC BLOOD PRESSURE: 79 MMHG | HEIGHT: 68 IN | SYSTOLIC BLOOD PRESSURE: 119 MMHG | TEMPERATURE: 97.2 F | BODY MASS INDEX: 42.44 KG/M2

## 2021-08-16 DIAGNOSIS — M19.90 UNSPECIFIED OSTEOARTHRITIS, UNSPECIFIED SITE: ICD-10-CM

## 2021-08-16 PROCEDURE — 99215 OFFICE O/P EST HI 40 MIN: CPT

## 2021-08-17 LAB
25(OH)D3 SERPL-MCNC: 32.2 NG/ML
APPEARANCE: ABNORMAL
BACTERIA: ABNORMAL
BILIRUBIN URINE: NEGATIVE
BLOOD URINE: ABNORMAL
C3 SERPL-MCNC: 84 MG/DL
C4 SERPL-MCNC: 12 MG/DL
COLOR: YELLOW
COVID-19 SPIKE DOMAIN ANTIBODY INTERPRETATION: NEGATIVE
CREAT SPEC-SCNC: 171 MG/DL
CREAT/PROT UR: 0.1 RATIO
GLUCOSE QUALITATIVE U: NEGATIVE
HYALINE CASTS: 2 /LPF
INR PPP: 1.8 RATIO
KETONES URINE: NEGATIVE
LEUKOCYTE ESTERASE URINE: ABNORMAL
MICROSCOPIC-UA: NORMAL
NITRITE URINE: POSITIVE
PH URINE: 5.5
PROT UR-MCNC: 21 MG/DL
PROTEIN URINE: ABNORMAL
PT BLD: 20.7 SEC
RED BLOOD CELLS URINE: 15 /HPF
SARS-COV-2 AB SERPL IA-ACNC: 0.4 U/ML
SPECIFIC GRAVITY URINE: 1.02
SQUAMOUS EPITHELIAL CELLS: 11 /HPF
URATE SERPL-MCNC: 5.3 MG/DL
URINE COMMENTS: NORMAL
UROBILINOGEN URINE: NORMAL
WHITE BLOOD CELLS URINE: 6 /HPF

## 2021-08-18 LAB
DSDNA AB SER-ACNC: 38 IU/ML
ENA RNP AB SER IA-ACNC: 0.3 AL
ENA SM AB SER IA-ACNC: 1 AL
ENA SS-A AB SER IA-ACNC: 2.4 AL
ENA SS-B AB SER IA-ACNC: <0.2 AL

## 2021-08-23 LAB
MISCELLANEOUS TEST: NORMAL
PROC NAME: NORMAL

## 2021-08-25 ENCOUNTER — APPOINTMENT (OUTPATIENT)
Dept: DERMATOLOGY | Facility: CLINIC | Age: 51
End: 2021-08-25
Payer: COMMERCIAL

## 2021-08-25 VITALS — BODY MASS INDEX: 41.83 KG/M2 | HEIGHT: 68 IN | WEIGHT: 276 LBS

## 2021-08-25 DIAGNOSIS — D23.9 OTHER BENIGN NEOPLASM OF SKIN, UNSPECIFIED: ICD-10-CM

## 2021-08-25 PROCEDURE — 99214 OFFICE O/P EST MOD 30 MIN: CPT

## 2021-08-25 NOTE — HISTORY OF PRESENT ILLNESS
[FreeTextEntry1] : f/u rash on buttocks [de-identified] : 51 year old here for f/u rash on buttocks. was consistent with HSV 2. improving.\par also spot on left shoulder.

## 2021-08-25 NOTE — PHYSICAL EXAM
[FreeTextEntry3] : AAOx3, pleasant, NAD, no visual lymphadenopathy\par hair, scalp, face, nose, eyelids, ears, lips, oropharynx, neck, chest, abdomen, back, right arm, left arm, nails, and hands examined with all normal findings,\par pertinent findings include:\par \par buttocks with healing excoriations\par Firm, pink, subcutaneous buttonholing papule on left shoulder

## 2021-08-25 NOTE — ASSESSMENT
[FreeTextEntry1] : 1) benign findings as above- education\par \par 2) rash; healing\par consistent with HSV2\par given valtrex PRN new flares; SED\par can use triple paste in area\par can stop steroid cream for now

## 2021-09-27 ENCOUNTER — APPOINTMENT (OUTPATIENT)
Dept: RHEUMATOLOGY | Facility: CLINIC | Age: 51
End: 2021-09-27
Payer: COMMERCIAL

## 2021-09-27 VITALS
BODY MASS INDEX: 42.89 KG/M2 | DIASTOLIC BLOOD PRESSURE: 89 MMHG | SYSTOLIC BLOOD PRESSURE: 134 MMHG | OXYGEN SATURATION: 97 % | TEMPERATURE: 97 F | HEART RATE: 75 BPM | WEIGHT: 283 LBS | RESPIRATION RATE: 16 BRPM | HEIGHT: 68 IN

## 2021-09-27 PROCEDURE — 99215 OFFICE O/P EST HI 40 MIN: CPT

## 2021-09-28 LAB
ALBUMIN SERPL ELPH-MCNC: 3.9 G/DL
ALP BLD-CCNC: 83 U/L
ALT SERPL-CCNC: 20 U/L
ANION GAP SERPL CALC-SCNC: 10 MMOL/L
APPEARANCE: ABNORMAL
AST SERPL-CCNC: 17 U/L
BACTERIA: ABNORMAL
BASOPHILS # BLD AUTO: 0.07 K/UL
BASOPHILS NFR BLD AUTO: 0.6 %
BILIRUB SERPL-MCNC: 0.3 MG/DL
BILIRUBIN URINE: NEGATIVE
BLOOD URINE: NEGATIVE
BUN SERPL-MCNC: 31 MG/DL
C3 SERPL-MCNC: 80 MG/DL
C4 SERPL-MCNC: 12 MG/DL
CALCIUM SERPL-MCNC: 9.5 MG/DL
CHLORIDE SERPL-SCNC: 112 MMOL/L
CK SERPL-CCNC: 53 U/L
CO2 SERPL-SCNC: 18 MMOL/L
COLOR: NORMAL
CREAT SERPL-MCNC: 1.88 MG/DL
CREAT SPEC-SCNC: 153 MG/DL
CREAT/PROT UR: 0.1 RATIO
EOSINOPHIL # BLD AUTO: 0.15 K/UL
EOSINOPHIL NFR BLD AUTO: 1.3 %
GLUCOSE QUALITATIVE U: NEGATIVE
HCT VFR BLD CALC: 37.5 %
HGB BLD-MCNC: 11.3 G/DL
HYALINE CASTS: 1 /LPF
IMM GRANULOCYTES NFR BLD AUTO: 0.9 %
INR PPP: 1.31 RATIO
KETONES URINE: NEGATIVE
LEUKOCYTE ESTERASE URINE: NEGATIVE
LYMPHOCYTES # BLD AUTO: 2.34 K/UL
LYMPHOCYTES NFR BLD AUTO: 20.2 %
MAN DIFF?: NORMAL
MCHC RBC-ENTMCNC: 26.1 PG
MCHC RBC-ENTMCNC: 30.1 GM/DL
MCV RBC AUTO: 86.6 FL
MICROSCOPIC-UA: NORMAL
MONOCYTES # BLD AUTO: 1.25 K/UL
MONOCYTES NFR BLD AUTO: 10.8 %
NEUTROPHILS # BLD AUTO: 7.69 K/UL
NEUTROPHILS NFR BLD AUTO: 66.2 %
NITRITE URINE: NEGATIVE
PH URINE: 5.5
PLATELET # BLD AUTO: 183 K/UL
POTASSIUM SERPL-SCNC: 4.8 MMOL/L
PROT SERPL-MCNC: 6.8 G/DL
PROT UR-MCNC: 13 MG/DL
PROTEIN URINE: NORMAL
PT BLD: 15.3 SEC
RBC # BLD: 4.33 M/UL
RBC # FLD: 18.5 %
RED BLOOD CELLS URINE: 1 /HPF
SODIUM SERPL-SCNC: 140 MMOL/L
SPECIFIC GRAVITY URINE: 1.01
SQUAMOUS EPITHELIAL CELLS: 6 /HPF
UROBILINOGEN URINE: NORMAL
WBC # FLD AUTO: 11.6 K/UL
WHITE BLOOD CELLS URINE: 2 /HPF

## 2021-10-01 LAB
MISCELLANEOUS TEST: NORMAL
PROC NAME: NORMAL

## 2021-10-07 ENCOUNTER — RX RENEWAL (OUTPATIENT)
Age: 51
End: 2021-10-07

## 2021-11-01 ENCOUNTER — APPOINTMENT (OUTPATIENT)
Dept: RHEUMATOLOGY | Facility: CLINIC | Age: 51
End: 2021-11-01
Payer: COMMERCIAL

## 2021-11-01 VITALS
BODY MASS INDEX: 42.74 KG/M2 | DIASTOLIC BLOOD PRESSURE: 82 MMHG | RESPIRATION RATE: 16 BRPM | WEIGHT: 282 LBS | TEMPERATURE: 97.3 F | SYSTOLIC BLOOD PRESSURE: 125 MMHG | HEIGHT: 68 IN | OXYGEN SATURATION: 97 % | HEART RATE: 93 BPM

## 2021-11-01 PROCEDURE — 99215 OFFICE O/P EST HI 40 MIN: CPT

## 2021-11-02 LAB
25(OH)D3 SERPL-MCNC: 28.7 NG/ML
ALBUMIN SERPL ELPH-MCNC: 3.7 G/DL
ALP BLD-CCNC: 93 U/L
ALT SERPL-CCNC: 17 U/L
ANION GAP SERPL CALC-SCNC: 17 MMOL/L
APPEARANCE: CLEAR
AST SERPL-CCNC: 15 U/L
BACTERIA: ABNORMAL
BASOPHILS # BLD AUTO: 0.05 K/UL
BASOPHILS NFR BLD AUTO: 0.4 %
BILIRUB SERPL-MCNC: 0.2 MG/DL
BILIRUBIN URINE: NEGATIVE
BLOOD URINE: NEGATIVE
BUN SERPL-MCNC: 41 MG/DL
CALCIUM SERPL-MCNC: 8.7 MG/DL
CHLORIDE SERPL-SCNC: 112 MMOL/L
CK SERPL-CCNC: 52 U/L
CO2 SERPL-SCNC: 14 MMOL/L
COLOR: NORMAL
COVID-19 SPIKE DOMAIN ANTIBODY INTERPRETATION: NEGATIVE
CREAT SERPL-MCNC: 2.13 MG/DL
CREAT SPEC-SCNC: 212 MG/DL
CREAT/PROT UR: 0.1 RATIO
EOSINOPHIL # BLD AUTO: 0.07 K/UL
EOSINOPHIL NFR BLD AUTO: 0.5 %
GLUCOSE QUALITATIVE U: NEGATIVE
HCT VFR BLD CALC: 34.1 %
HGB BLD-MCNC: 10.7 G/DL
HYALINE CASTS: 2 /LPF
IMM GRANULOCYTES NFR BLD AUTO: 0.7 %
INR PPP: 2.65 RATIO
KETONES URINE: NEGATIVE
LEUKOCYTE ESTERASE URINE: ABNORMAL
LYMPHOCYTES # BLD AUTO: 1.74 K/UL
LYMPHOCYTES NFR BLD AUTO: 12.5 %
MAN DIFF?: NORMAL
MCHC RBC-ENTMCNC: 26.1 PG
MCHC RBC-ENTMCNC: 31.4 GM/DL
MCV RBC AUTO: 83.2 FL
MICROSCOPIC-UA: NORMAL
MONOCYTES # BLD AUTO: 1.45 K/UL
MONOCYTES NFR BLD AUTO: 10.5 %
NEUTROPHILS # BLD AUTO: 10.46 K/UL
NEUTROPHILS NFR BLD AUTO: 75.4 %
NITRITE URINE: NEGATIVE
PH URINE: 5.5
PLATELET # BLD AUTO: 193 K/UL
POTASSIUM SERPL-SCNC: 4.1 MMOL/L
PROT SERPL-MCNC: 6.7 G/DL
PROT UR-MCNC: 11 MG/DL
PROTEIN URINE: NORMAL
PT BLD: 29.8 SEC
RBC # BLD: 4.1 M/UL
RBC # FLD: 19.2 %
RED BLOOD CELLS URINE: 1 /HPF
SARS-COV-2 AB SERPL IA-ACNC: 0.4 U/ML
SODIUM SERPL-SCNC: 143 MMOL/L
SPECIFIC GRAVITY URINE: 1.02
SQUAMOUS EPITHELIAL CELLS: 6 /HPF
URATE SERPL-MCNC: 5.5 MG/DL
UROBILINOGEN URINE: NORMAL
WBC # FLD AUTO: 13.87 K/UL
WHITE BLOOD CELLS URINE: 7 /HPF

## 2021-11-03 LAB
C3 SERPL-MCNC: 86 MG/DL
C4 SERPL-MCNC: 12 MG/DL
DSDNA AB SER-ACNC: 42 IU/ML

## 2021-11-04 LAB
MISCELLANEOUS TEST: NORMAL
PROC NAME: NORMAL

## 2021-12-07 ENCOUNTER — FORM ENCOUNTER (OUTPATIENT)
Age: 51
End: 2021-12-07

## 2021-12-08 ENCOUNTER — RESULT REVIEW (OUTPATIENT)
Age: 51
End: 2021-12-08

## 2021-12-13 ENCOUNTER — LABORATORY RESULT (OUTPATIENT)
Age: 51
End: 2021-12-13

## 2021-12-13 ENCOUNTER — APPOINTMENT (OUTPATIENT)
Dept: RHEUMATOLOGY | Facility: CLINIC | Age: 51
End: 2021-12-13
Payer: COMMERCIAL

## 2021-12-13 VITALS
WEIGHT: 283 LBS | OXYGEN SATURATION: 98 % | HEIGHT: 68 IN | BODY MASS INDEX: 42.89 KG/M2 | HEART RATE: 106 BPM | SYSTOLIC BLOOD PRESSURE: 117 MMHG | TEMPERATURE: 97.3 F | DIASTOLIC BLOOD PRESSURE: 79 MMHG

## 2021-12-13 LAB
25(OH)D3 SERPL-MCNC: 22.2 NG/ML
ALBUMIN SERPL ELPH-MCNC: 3.6 G/DL
ALP BLD-CCNC: 75 U/L
ALT SERPL-CCNC: 28 U/L
ANION GAP SERPL CALC-SCNC: 11 MMOL/L
APPEARANCE: ABNORMAL
AST SERPL-CCNC: 18 U/L
BACTERIA: NEGATIVE
BASOPHILS # BLD AUTO: 0.11 K/UL
BASOPHILS NFR BLD AUTO: 0.9 %
BILIRUB SERPL-MCNC: 0.2 MG/DL
BILIRUBIN URINE: NEGATIVE
BLOOD URINE: NEGATIVE
BUN SERPL-MCNC: 27 MG/DL
CALCIUM SERPL-MCNC: 8.9 MG/DL
CHLORIDE SERPL-SCNC: 113 MMOL/L
CK SERPL-CCNC: 57 U/L
CO2 SERPL-SCNC: 17 MMOL/L
COLOR: NORMAL
COVID-19 SPIKE DOMAIN ANTIBODY INTERPRETATION: POSITIVE
CREAT SERPL-MCNC: 1.8 MG/DL
CREAT SPEC-SCNC: 220 MG/DL
CREAT/PROT UR: 0 RATIO
EOSINOPHIL # BLD AUTO: 0.11 K/UL
EOSINOPHIL NFR BLD AUTO: 0.9 %
GLUCOSE QUALITATIVE U: NEGATIVE
HCT VFR BLD CALC: 33.1 %
HGB BLD-MCNC: 10.1 G/DL
HYALINE CASTS: 4 /LPF
INR PPP: 1.59 RATIO
KETONES URINE: NEGATIVE
LEUKOCYTE ESTERASE URINE: ABNORMAL
LYMPHOCYTES # BLD AUTO: 1.48 K/UL
LYMPHOCYTES NFR BLD AUTO: 12.2 %
MAN DIFF?: NORMAL
MCHC RBC-ENTMCNC: 24.3 PG
MCHC RBC-ENTMCNC: 30.5 GM/DL
MCV RBC AUTO: 79.6 FL
MICROSCOPIC-UA: NORMAL
MONOCYTES # BLD AUTO: 0.84 K/UL
MONOCYTES NFR BLD AUTO: 6.9 %
NEUTROPHILS # BLD AUTO: 9.61 K/UL
NEUTROPHILS NFR BLD AUTO: 79.1 %
NITRITE URINE: NEGATIVE
PH URINE: 5.5
PLATELET # BLD AUTO: 195 K/UL
POTASSIUM SERPL-SCNC: 4.5 MMOL/L
PROT SERPL-MCNC: 6.4 G/DL
PROT UR-MCNC: 9 MG/DL
PROTEIN URINE: ABNORMAL
PT BLD: 18.3 SEC
RBC # BLD: 4.16 M/UL
RBC # FLD: 19 %
RED BLOOD CELLS URINE: 2 /HPF
SARS-COV-2 AB SERPL IA-ACNC: 27 U/ML
SODIUM SERPL-SCNC: 141 MMOL/L
SPECIFIC GRAVITY URINE: 1.02
SQUAMOUS EPITHELIAL CELLS: 16 /HPF
URATE SERPL-MCNC: 6 MG/DL
UROBILINOGEN URINE: NORMAL
WBC # FLD AUTO: 12.15 K/UL
WHITE BLOOD CELLS URINE: 14 /HPF

## 2021-12-13 PROCEDURE — 99215 OFFICE O/P EST HI 40 MIN: CPT

## 2021-12-14 LAB
C3 SERPL-MCNC: 83 MG/DL
C4 SERPL-MCNC: 12 MG/DL

## 2021-12-15 LAB — DSDNA AB SER-ACNC: 22 IU/ML

## 2021-12-21 ENCOUNTER — RX RENEWAL (OUTPATIENT)
Age: 51
End: 2021-12-21

## 2021-12-22 LAB
INR PPP: 2.57 RATIO
PT BLD: 29 SEC

## 2022-02-08 ENCOUNTER — NON-APPOINTMENT (OUTPATIENT)
Age: 52
End: 2022-02-08

## 2022-02-08 ENCOUNTER — LABORATORY RESULT (OUTPATIENT)
Age: 52
End: 2022-02-08

## 2022-02-08 ENCOUNTER — APPOINTMENT (OUTPATIENT)
Dept: RHEUMATOLOGY | Facility: CLINIC | Age: 52
End: 2022-02-08
Payer: COMMERCIAL

## 2022-02-08 VITALS
TEMPERATURE: 97.3 F | BODY MASS INDEX: 42.44 KG/M2 | DIASTOLIC BLOOD PRESSURE: 71 MMHG | HEIGHT: 68 IN | SYSTOLIC BLOOD PRESSURE: 103 MMHG | OXYGEN SATURATION: 96 % | RESPIRATION RATE: 18 BRPM | HEART RATE: 89 BPM | WEIGHT: 280 LBS

## 2022-02-08 LAB
APPEARANCE: ABNORMAL
BACTERIA: NEGATIVE
BILIRUBIN URINE: NEGATIVE
BLOOD URINE: NEGATIVE
COLOR: NORMAL
GLUCOSE QUALITATIVE U: NEGATIVE
HYALINE CASTS: 3 /LPF
INR PPP: 10.41 RATIO
KETONES URINE: NEGATIVE
LEUKOCYTE ESTERASE URINE: NEGATIVE
MICROSCOPIC-UA: NORMAL
NITRITE URINE: NEGATIVE
PH URINE: 5.5
PROTEIN URINE: ABNORMAL
PT BLD: 109.9 SEC
RED BLOOD CELLS URINE: 2 /HPF
SPECIFIC GRAVITY URINE: 1.02
SQUAMOUS EPITHELIAL CELLS: 9 /HPF
UROBILINOGEN URINE: NORMAL
WHITE BLOOD CELLS URINE: 6 /HPF

## 2022-02-08 PROCEDURE — 99215 OFFICE O/P EST HI 40 MIN: CPT

## 2022-02-09 ENCOUNTER — INPATIENT (INPATIENT)
Facility: HOSPITAL | Age: 52
LOS: 6 days | Discharge: HOME | DRG: 815 | End: 2022-02-16
Attending: INTERNAL MEDICINE | Admitting: INTERNAL MEDICINE
Payer: COMMERCIAL

## 2022-02-09 ENCOUNTER — NON-APPOINTMENT (OUTPATIENT)
Age: 52
End: 2022-02-09

## 2022-02-09 VITALS
SYSTOLIC BLOOD PRESSURE: 144 MMHG | DIASTOLIC BLOOD PRESSURE: 89 MMHG | WEIGHT: 279.99 LBS | HEIGHT: 69 IN | RESPIRATION RATE: 16 BRPM | TEMPERATURE: 98 F | OXYGEN SATURATION: 99 % | HEART RATE: 119 BPM

## 2022-02-09 DIAGNOSIS — D36.9 BENIGN NEOPLASM, UNSPECIFIED SITE: Chronic | ICD-10-CM

## 2022-02-09 LAB
ALBUMIN SERPL ELPH-MCNC: 3.6 G/DL
ALP BLD-CCNC: 80 U/L
ALT SERPL-CCNC: 13 U/L
ANION GAP SERPL CALC-SCNC: 14 MMOL/L
AST SERPL-CCNC: 22 U/L
BASOPHILS # BLD AUTO: 0 K/UL
BASOPHILS NFR BLD AUTO: 0 %
BILIRUB SERPL-MCNC: 0.3 MG/DL
BUN SERPL-MCNC: 35 MG/DL
C3 SERPL-MCNC: 134 MG/DL
C4 SERPL-MCNC: 16 MG/DL
CALCIUM SERPL-MCNC: 9.3 MG/DL
CHLORIDE SERPL-SCNC: 112 MMOL/L
CO2 SERPL-SCNC: 14 MMOL/L
COVID-19 SPIKE DOMAIN ANTIBODY INTERPRETATION: POSITIVE
CREAT SERPL-MCNC: 2.23 MG/DL
CREAT SPEC-SCNC: 263 MG/DL
CREAT/PROT UR: 0.1 RATIO
DSDNA AB SER-ACNC: 25 IU/ML
ENA RNP AB SER IA-ACNC: 0.3 AL
ENA SM AB SER IA-ACNC: 0.5 AL
ENA SS-A AB SER IA-ACNC: 2.1 AL
ENA SS-B AB SER IA-ACNC: <0.2 AL
EOSINOPHIL # BLD AUTO: 0.1 K/UL
EOSINOPHIL NFR BLD AUTO: 0.9 %
HCT VFR BLD CALC: 32 %
HGB BLD-MCNC: 9.4 G/DL
LYMPHOCYTES # BLD AUTO: 1.5 K/UL
LYMPHOCYTES NFR BLD AUTO: 13 %
MAN DIFF?: NORMAL
MCHC RBC-ENTMCNC: 23.4 PG
MCHC RBC-ENTMCNC: 29.4 GM/DL
MCV RBC AUTO: 79.8 FL
MONOCYTES # BLD AUTO: 0.81 K/UL
MONOCYTES NFR BLD AUTO: 7 %
NEUTROPHILS # BLD AUTO: 8.95 K/UL
NEUTROPHILS NFR BLD AUTO: 77.4 %
PLATELET # BLD AUTO: 234 K/UL
POTASSIUM SERPL-SCNC: 4.7 MMOL/L
PROT SERPL-MCNC: 6.6 G/DL
PROT UR-MCNC: 23 MG/DL
RBC # BLD: 4.01 M/UL
RBC # FLD: 21.2 %
SARS-COV-2 AB SERPL IA-ACNC: >250 U/ML
SODIUM SERPL-SCNC: 140 MMOL/L
URATE SERPL-MCNC: 5.3 MG/DL
WBC # FLD AUTO: 11.56 K/UL

## 2022-02-09 PROCEDURE — 99285 EMERGENCY DEPT VISIT HI MDM: CPT

## 2022-02-09 NOTE — ED ADULT TRIAGE NOTE - NS ED TRIAGE AVPU SCALE
Alert-The patient is alert, awake and responds to voice. The patient is oriented to time, place, and person. The triage nurse is able to obtain subjective information. (0) independent

## 2022-02-09 NOTE — ED ADULT TRIAGE NOTE - CHIEF COMPLAINT QUOTE
Abnormal labs, INR was 11.5 on outpt tests, takes warfarin daily. Swelling to left arm starting yesterday.

## 2022-02-10 DIAGNOSIS — R79.1 ABNORMAL COAGULATION PROFILE: ICD-10-CM

## 2022-02-10 LAB
ALBUMIN SERPL ELPH-MCNC: 3.6 G/DL — SIGNIFICANT CHANGE UP (ref 3.3–5)
ALP SERPL-CCNC: 119 U/L — SIGNIFICANT CHANGE UP (ref 40–120)
ALT FLD-CCNC: 16 U/L — SIGNIFICANT CHANGE UP (ref 10–45)
ANION GAP SERPL CALC-SCNC: 12 MMOL/L — SIGNIFICANT CHANGE UP (ref 5–17)
ANION GAP SERPL CALC-SCNC: 12 MMOL/L — SIGNIFICANT CHANGE UP (ref 5–17)
ANISOCYTOSIS BLD QL: SIGNIFICANT CHANGE UP
APTT BLD: 116.3 SEC — HIGH (ref 27.5–35.5)
APTT BLD: 40.2 SEC — HIGH (ref 27.5–35.5)
AST SERPL-CCNC: 17 U/L — SIGNIFICANT CHANGE UP (ref 10–40)
BASOPHILS # BLD AUTO: 0 K/UL — SIGNIFICANT CHANGE UP (ref 0–0.2)
BASOPHILS NFR BLD AUTO: 0 % — SIGNIFICANT CHANGE UP (ref 0–2)
BILIRUB SERPL-MCNC: 0.2 MG/DL — SIGNIFICANT CHANGE UP (ref 0.2–1.2)
BUN SERPL-MCNC: 38 MG/DL — HIGH (ref 7–23)
BUN SERPL-MCNC: 46 MG/DL — HIGH (ref 7–23)
CALCIUM SERPL-MCNC: 9.3 MG/DL — SIGNIFICANT CHANGE UP (ref 8.4–10.5)
CALCIUM SERPL-MCNC: 9.5 MG/DL — SIGNIFICANT CHANGE UP (ref 8.4–10.5)
CHLORIDE SERPL-SCNC: 110 MMOL/L — HIGH (ref 96–108)
CHLORIDE SERPL-SCNC: 113 MMOL/L — HIGH (ref 96–108)
CO2 SERPL-SCNC: 14 MMOL/L — LOW (ref 22–31)
CO2 SERPL-SCNC: 14 MMOL/L — LOW (ref 22–31)
CREAT SERPL-MCNC: 1.94 MG/DL — HIGH (ref 0.5–1.3)
CREAT SERPL-MCNC: 2.22 MG/DL — HIGH (ref 0.5–1.3)
DACRYOCYTES BLD QL SMEAR: SLIGHT — SIGNIFICANT CHANGE UP
EOSINOPHIL # BLD AUTO: 0.11 K/UL — SIGNIFICANT CHANGE UP (ref 0–0.5)
EOSINOPHIL NFR BLD AUTO: 0.9 % — SIGNIFICANT CHANGE UP (ref 0–6)
GLUCOSE SERPL-MCNC: 100 MG/DL — HIGH (ref 70–99)
GLUCOSE SERPL-MCNC: 101 MG/DL — HIGH (ref 70–99)
HCT VFR BLD CALC: 26.3 % — LOW (ref 34.5–45)
HCT VFR BLD CALC: 26.8 % — LOW (ref 34.5–45)
HGB BLD-MCNC: 8.3 G/DL — LOW (ref 11.5–15.5)
HGB BLD-MCNC: 8.4 G/DL — LOW (ref 11.5–15.5)
HYPOCHROMIA BLD QL: SLIGHT — SIGNIFICANT CHANGE UP
INR BLD: 1.62 RATIO — HIGH (ref 0.88–1.16)
INR BLD: 9.51 RATIO — CRITICAL HIGH (ref 0.88–1.16)
INR PPP: 11.86 RATIO
LYMPHOCYTES # BLD AUTO: 0.89 K/UL — LOW (ref 1–3.3)
LYMPHOCYTES # BLD AUTO: 7.3 % — LOW (ref 13–44)
MACROCYTES BLD QL: SLIGHT — SIGNIFICANT CHANGE UP
MAGNESIUM SERPL-MCNC: 1.6 MG/DL — SIGNIFICANT CHANGE UP (ref 1.6–2.6)
MANUAL SMEAR VERIFICATION: SIGNIFICANT CHANGE UP
MCHC RBC-ENTMCNC: 23.5 PG — LOW (ref 27–34)
MCHC RBC-ENTMCNC: 23.6 PG — LOW (ref 27–34)
MCHC RBC-ENTMCNC: 31.3 GM/DL — LOW (ref 32–36)
MCHC RBC-ENTMCNC: 31.6 GM/DL — LOW (ref 32–36)
MCV RBC AUTO: 74.7 FL — LOW (ref 80–100)
MCV RBC AUTO: 75.1 FL — LOW (ref 80–100)
MICROCYTES BLD QL: SIGNIFICANT CHANGE UP
MONOCYTES # BLD AUTO: 1.22 K/UL — HIGH (ref 0–0.9)
MONOCYTES NFR BLD AUTO: 10 % — SIGNIFICANT CHANGE UP (ref 2–14)
NEUTROPHILS # BLD AUTO: 9.96 K/UL — HIGH (ref 1.8–7.4)
NEUTROPHILS NFR BLD AUTO: 81.8 % — HIGH (ref 43–77)
NRBC # BLD: 1 /100 WBCS — HIGH (ref 0–0)
NRBC # BLD: 3 /100 — HIGH (ref 0–0)
OVALOCYTES BLD QL SMEAR: SLIGHT — SIGNIFICANT CHANGE UP
PHOSPHATE SERPL-MCNC: 3 MG/DL — SIGNIFICANT CHANGE UP (ref 2.5–4.5)
PLAT MORPH BLD: NORMAL — SIGNIFICANT CHANGE UP
PLATELET # BLD AUTO: 204 K/UL — SIGNIFICANT CHANGE UP (ref 150–400)
PLATELET # BLD AUTO: 209 K/UL — SIGNIFICANT CHANGE UP (ref 150–400)
POIKILOCYTOSIS BLD QL AUTO: SIGNIFICANT CHANGE UP
POLYCHROMASIA BLD QL SMEAR: SLIGHT — SIGNIFICANT CHANGE UP
POTASSIUM SERPL-MCNC: 4.2 MMOL/L — SIGNIFICANT CHANGE UP (ref 3.5–5.3)
POTASSIUM SERPL-MCNC: 4.7 MMOL/L — SIGNIFICANT CHANGE UP (ref 3.5–5.3)
POTASSIUM SERPL-SCNC: 4.2 MMOL/L — SIGNIFICANT CHANGE UP (ref 3.5–5.3)
POTASSIUM SERPL-SCNC: 4.7 MMOL/L — SIGNIFICANT CHANGE UP (ref 3.5–5.3)
PROT SERPL-MCNC: 6.6 G/DL — SIGNIFICANT CHANGE UP (ref 6–8.3)
PROTHROM AB SERPL-ACNC: 102.5 SEC — HIGH (ref 10.6–13.6)
PROTHROM AB SERPL-ACNC: 19 SEC — HIGH (ref 10.6–13.6)
PT BLD: 124.4 SEC
RBC # BLD: 3.52 M/UL — LOW (ref 3.8–5.2)
RBC # BLD: 3.57 M/UL — LOW (ref 3.8–5.2)
RBC # FLD: 21.2 % — HIGH (ref 10.3–14.5)
RBC # FLD: 21.2 % — HIGH (ref 10.3–14.5)
RBC BLD AUTO: ABNORMAL
SARS-COV-2 RNA SPEC QL NAA+PROBE: SIGNIFICANT CHANGE UP
SODIUM SERPL-SCNC: 136 MMOL/L — SIGNIFICANT CHANGE UP (ref 135–145)
SODIUM SERPL-SCNC: 139 MMOL/L — SIGNIFICANT CHANGE UP (ref 135–145)
SPHEROCYTES BLD QL SMEAR: SLIGHT — SIGNIFICANT CHANGE UP
WBC # BLD: 12.18 K/UL — HIGH (ref 3.8–10.5)
WBC # BLD: 12.63 K/UL — HIGH (ref 3.8–10.5)
WBC # FLD AUTO: 12.18 K/UL — HIGH (ref 3.8–10.5)
WBC # FLD AUTO: 12.63 K/UL — HIGH (ref 3.8–10.5)

## 2022-02-10 PROCEDURE — 76377 3D RENDER W/INTRP POSTPROCES: CPT | Mod: 26

## 2022-02-10 PROCEDURE — 99221 1ST HOSP IP/OBS SF/LOW 40: CPT

## 2022-02-10 PROCEDURE — 73200 CT UPPER EXTREMITY W/O DYE: CPT | Mod: 26,LT,MA

## 2022-02-10 RX ORDER — FAMOTIDINE 10 MG/ML
20 INJECTION INTRAVENOUS ONCE
Refills: 0 | Status: COMPLETED | OUTPATIENT
Start: 2022-02-10 | End: 2022-02-10

## 2022-02-10 RX ORDER — ACETAMINOPHEN 500 MG
650 TABLET ORAL EVERY 8 HOURS
Refills: 0 | Status: COMPLETED | OUTPATIENT
Start: 2022-02-10 | End: 2022-02-14

## 2022-02-10 RX ORDER — CHOLECALCIFEROL (VITAMIN D3) 125 MCG
5000 CAPSULE ORAL DAILY
Refills: 0 | Status: DISCONTINUED | OUTPATIENT
Start: 2022-02-10 | End: 2022-02-16

## 2022-02-10 RX ORDER — OXYCODONE HYDROCHLORIDE 5 MG/1
5 TABLET ORAL EVERY 6 HOURS
Refills: 0 | Status: DISCONTINUED | OUTPATIENT
Start: 2022-02-10 | End: 2022-02-16

## 2022-02-10 RX ORDER — MYCOPHENOLATE MOFETIL 250 MG/1
500 CAPSULE ORAL
Refills: 0 | Status: DISCONTINUED | OUTPATIENT
Start: 2022-02-10 | End: 2022-02-16

## 2022-02-10 RX ORDER — ACETAMINOPHEN 500 MG
3 TABLET ORAL
Qty: 0 | Refills: 0 | DISCHARGE

## 2022-02-10 RX ORDER — OXYCODONE HYDROCHLORIDE 5 MG/1
5 TABLET ORAL ONCE
Refills: 0 | Status: DISCONTINUED | OUTPATIENT
Start: 2022-02-10 | End: 2022-02-10

## 2022-02-10 RX ORDER — WARFARIN SODIUM 2.5 MG/1
3 TABLET ORAL ONCE
Refills: 0 | Status: COMPLETED | OUTPATIENT
Start: 2022-02-10 | End: 2022-02-10

## 2022-02-10 RX ORDER — HYDROXYCHLOROQUINE SULFATE 200 MG
200 TABLET ORAL DAILY
Refills: 0 | Status: DISCONTINUED | OUTPATIENT
Start: 2022-02-10 | End: 2022-02-16

## 2022-02-10 RX ORDER — PHYTONADIONE (VIT K1) 5 MG
10 TABLET ORAL ONCE
Refills: 0 | Status: COMPLETED | OUTPATIENT
Start: 2022-02-10 | End: 2022-02-10

## 2022-02-10 RX ORDER — PHYTONADIONE (VIT K1) 5 MG
5 TABLET ORAL ONCE
Refills: 0 | Status: DISCONTINUED | OUTPATIENT
Start: 2022-02-10 | End: 2022-02-10

## 2022-02-10 RX ORDER — LOSARTAN POTASSIUM 100 MG/1
100 TABLET, FILM COATED ORAL DAILY
Refills: 0 | Status: DISCONTINUED | OUTPATIENT
Start: 2022-02-10 | End: 2022-02-10

## 2022-02-10 RX ORDER — IBUPROFEN 200 MG
3 TABLET ORAL
Qty: 0 | Refills: 0 | DISCHARGE

## 2022-02-10 RX ADMIN — Medication 200 MILLIGRAM(S): at 17:34

## 2022-02-10 RX ADMIN — MYCOPHENOLATE MOFETIL 500 MILLIGRAM(S): 250 CAPSULE ORAL at 18:25

## 2022-02-10 RX ADMIN — Medication 650 MILLIGRAM(S): at 16:50

## 2022-02-10 RX ADMIN — Medication 650 MILLIGRAM(S): at 16:19

## 2022-02-10 RX ADMIN — OXYCODONE HYDROCHLORIDE 5 MILLIGRAM(S): 5 TABLET ORAL at 01:11

## 2022-02-10 RX ADMIN — OXYCODONE HYDROCHLORIDE 5 MILLIGRAM(S): 5 TABLET ORAL at 22:12

## 2022-02-10 RX ADMIN — WARFARIN SODIUM 3 MILLIGRAM(S): 2.5 TABLET ORAL at 22:12

## 2022-02-10 RX ADMIN — OXYCODONE HYDROCHLORIDE 5 MILLIGRAM(S): 5 TABLET ORAL at 16:20

## 2022-02-10 RX ADMIN — Medication 102 MILLIGRAM(S): at 04:08

## 2022-02-10 RX ADMIN — Medication 650 MILLIGRAM(S): at 22:12

## 2022-02-10 RX ADMIN — Medication 5000 UNIT(S): at 18:25

## 2022-02-10 RX ADMIN — FAMOTIDINE 20 MILLIGRAM(S): 10 INJECTION INTRAVENOUS at 22:12

## 2022-02-10 RX ADMIN — Medication 10 MILLIGRAM(S): at 17:34

## 2022-02-10 RX ADMIN — OXYCODONE HYDROCHLORIDE 5 MILLIGRAM(S): 5 TABLET ORAL at 22:32

## 2022-02-10 RX ADMIN — OXYCODONE HYDROCHLORIDE 5 MILLIGRAM(S): 5 TABLET ORAL at 16:33

## 2022-02-10 RX ADMIN — Medication 650 MILLIGRAM(S): at 22:32

## 2022-02-10 RX ADMIN — OXYCODONE HYDROCHLORIDE 5 MILLIGRAM(S): 5 TABLET ORAL at 04:25

## 2022-02-10 NOTE — CONSULT NOTE ADULT - ASSESSMENT
51F DVT, SLE on coumadin sent in for supratherapeutic INR found to have L axillary possible hematoma    Recommendations:  -hold anticoagulation if no contraindication  -will follow    Discussed with vascular surgery fellow on behalf of Dr. Siddiqui    VASCULAR SURGERY  [id number] 51F RLE DVT 9/2021 on coumadin, SLE sent in for supratherapeutic INR found to have L axillary possible hematoma    Recommendations:  -hold anticoagulation if no contraindication  -will follow    Discussed with vascular surgery fellow on behalf of Dr. Siddiqui    VASCULAR SURGERY  [id number]

## 2022-02-10 NOTE — ED PROVIDER NOTE - ATTENDING CONTRIBUTION TO CARE
Agree with above except noted:     h.o Lupus, DVT/PE (on warfarin), CKD, p.w abn INR to 11 on today's lab and left shoulder pain (atraumatic) for 2 days. no hematuria, hematemesis, hemoptysis, or headaches. Patient denied any chest pain or shortness of breath, or new medications. reports eating more greens/vegetables for weight loss recently. will rpt coag and will get comprehensive labs to evaluate for hematologic abnormality, renal function, electrolyte derangement for possible symptom etiology. will reverse with vit K, will give FFP if needed. will CT shoulder for possible hematoma. no sign of infection on exam or history. dispo pending labs/imaging/further workups.

## 2022-02-10 NOTE — ED ADULT NURSE REASSESSMENT NOTE - NS ED NURSE REASSESS COMMENT FT1
Pt resting in stretcher. A&ox4, spontaneous breathing and equal chest rise. VSS on room air. NAD. Pt to go to ED holding.

## 2022-02-10 NOTE — H&P ADULT - ATTENDING COMMENTS
51 year old female with PMHx of DVT on AC, Gout, HTN, Iron Deficiency Anemia, Obesity, SLE on cellcept, plaquenil and prednisone. Presents to [facility] by Rheumatologist for elevated INR.    #Supratherapeutic INR   INR 9.51 on admission  s/p Vitamin K x1  check stat INR and monitor    #Left Shoulder Pain:  CT Shoulder with extensive dense surrounding subcutaneous inflammatory fat stranding versus contusion  which extends into the medial aspect of the upper arm and along the subclavian vessels. No soft tissue gas. No abscess.  ?hematoma axillary area ?involvement of neurovascular structures  monitor INR for downward trend  monitor cbc  No acute fx or dislocation  Pain mgmt  VAscular cs    #Iron Deficiency Anemia:  H/H 8.3/26.3  Serial CBC's  Transfuse PRN  Cont to monitor     #SLE -resume home meds  on cellcept, plaquenil and prednisone.    #INDIA on CKD baseline cr 1.6- lupus nephritis  Monitor I/O's  Serial Cr, check bmp stat  check UA  Avoid nephrotoxins  Renally dose medications  Continue to monitor    #HTN:  BP stable  Cont home CV meds  Holding Losartan 2/2 INDIA    #Hx of DVT:  On Coumadin  Held for now  Cont to monitor INR      [facility]  [phone]

## 2022-02-10 NOTE — ED PROVIDER NOTE - OBJECTIVE STATEMENT
52 y/o F hx SLE (on prednisone), SLE nephritis, HTN, DVTs on coumadin, sent in for elevated INR to 11 and concern for L shoulder hematoma. Pt states she started experiencing pain in L shoulder yesterday morning which progressively worsened during the day. Also noted she had difficulty moving shoulder joint as the day progressed. States she saw her rheumatologist in clinic yesterday for a routine visit who obtained routine labs including INR. States she was called later in the day and asked come in today for a repeat INR. Repeat INR per pt was also elevated, prompting pt to come into the E.D per recommendation of her rheumatologist. States she has been trying to lose weight and eating a lot of cucumbers recently. Otherwise without antibiotics, new medications or changes in medication regimen.    ROS positive for diffuse bruising on Upper extremities b/l. Denies GI Bleed, hematuria, epistaxis. fevers, chills, nausea, vomiting, chest pain, SOB, headaches, dizziness or light headedness. 52 y/o F hx SLE (on prednisone), SLE nephritis, HTN, DVTs on coumadin, sent in for elevated INR to 11 and concern for L shoulder hematoma. Pt states she started experiencing pain in L shoulder yesterday morning which progressively worsened during the day. Also noted she had difficulty moving shoulder joint as the day progressed. States she saw her rheumatologist in clinic yesterday for a routine visit who obtained routine labs including INR. States she was called later in the day and asked come in today for a repeat INR. Repeat INR per pt was also elevated, prompting pt to come into the E.D per recommendation of her rheumatologist. States she has been trying to lose weight and eating a lot of cucumbers recently. Otherwise without antibiotics, new medications or changes in medication regimen.    ROS positive for diffuse bruising on Upper extremities b/l. Denies GI Bleed, hematuria, epistaxis. fevers, chills, nausea, vomiting, chest pain, SOB, headaches, dizziness or light headedness.    PCP: Kulwant Greco  Rheumatologist: Eb Guerra

## 2022-02-10 NOTE — ED PROVIDER NOTE - NS ED ROS FT
CONSTITUTIONAL:  No weight loss, fever, chills, weakness or fatigue.  HEENT:  No visual loss,  No hearing loss, sneezing, congestion, runny nose or sore throat.  SKIN:  No rash or itching.  CARDIOVASCULAR:  No chest pain, chest pressure or chest discomfort. No palpitations.  RESPIRATORY:  No shortness of breath, cough or sputum.  GASTROINTESTINAL:  No nausea, vomiting or diarrhea. No abdominal pain or blood.  NEUROLOGICAL:  No headache, dizziness, numbness or tingling in the extremities. No change in bowel or bladder control.  MUSCULOSKELETAL:  No muscle, back pain, joint pain or stiffness.  HEMATOLOGIC:  no bleeding, + bruising.  ENDOCRINOLOGIC:  No reports of sweating, cold or heat intolerance. No polyuria or polydipsia.

## 2022-02-10 NOTE — H&P ADULT - NSHPLABSRESULTS_GEN_ALL_CORE
LABS:                        8.3    12.18 )-----------( 209      ( 10 Feb 2022 01:10 )             26.3     02-10    139  |  113<H>  |  46<H>  ----------------------------<  100<H>  4.7   |  14<L>  |  2.22<H>    Ca    9.3      10 Feb 2022 01:10  Phos  3.0     02-10  Mg     1.6     02-10    TPro  6.6  /  Alb  3.6  /  TBili  0.2  /  DBili  x   /  AST  17  /  ALT  16  /  AlkPhos  119  02-10    PT/INR - ( 10 Feb 2022 01:10 )   PT: 102.5 sec;   INR: 9.51 ratio         PTT - ( 10 Feb 2022 01:10 )  PTT:116.3 sec  CAPILLARY BLOOD GLUCOSE          RADIOLOGY & ADDITIONAL TESTS:    < from: CT Shoulder No Cont, Left (02.10.22 @ 01:46) >    Surgical clips in the left axilla/superior left pelvic sidewall with   extensive  dense surrounding subcutaneous inflammatory fat stranding versus contusion  which extends into the medial aspect of the upper arm and along the   subclavian  vessels. No soft tissue gas. No abscess.        ******PRELIMINARY REPORT******      < end of copied text >        Imaging Personally Reviewed:  [x] YES  [ ] NO    Consultant(s) Notes Reviewed:  [x] YES  [ ] NO    Care Discussed with Consultants/Other Providers [x] YES  [ ] NO LABS:                        8.3    12.18 )-----------( 209      ( 10 Feb 2022 01:10 )             26.3     02-10    139  |  113<H>  |  46<H>  ----------------------------<  100<H>  4.7   |  14<L>  |  2.22<H>    Ca    9.3      10 Feb 2022 01:10  Phos  3.0     02-10  Mg     1.6     02-10    TPro  6.6  /  Alb  3.6  /  TBili  0.2  /  DBili  x   /  AST  17  /  ALT  16  /  AlkPhos  119  02-10    PT/INR - ( 10 Feb 2022 01:10 )   PT: 102.5 sec;   INR: 9.51 ratio         PTT - ( 10 Feb 2022 01:10 )  PTT:116.3 sec  CAPILLARY BLOOD GLUCOSE          RADIOLOGY & ADDITIONAL TESTS:    < from: CT Shoulder No Cont, Left (02.10.22 @ 01:46) >    Surgical clips in the left axilla/superior left pelvic sidewall with   extensive  dense surrounding subcutaneous inflammatory fat stranding versus contusion  which extends into the medial aspect of the upper arm and along the   subclavian  vessels. No soft tissue gas. No abscess.    < from: CT 3D Reconstruct w/ Workstation (02.10.22 @ 01:46) >    Ill-defined intermediate to increased attenuation which tracks along the   axillary neurovascular structures in the upper axillary region. This may   be related to ill-defined hematoma or nonspecific inflammatory change.   Correlation with clinical history and laboratory values is recommended.   Follow-up imaging is recommended to confirm resolution exclude any   underlying mass.    No evidence of acute fracture or dislocation.    < end of copied text >            ******PRELIMINARY REPORT******      < end of copied text >        Imaging Personally Reviewed:  [x] YES  [ ] NO    Consultant(s) Notes Reviewed:  [x] YES  [ ] NO    Care Discussed with Consultants/Other Providers [x] YES  [ ] NO

## 2022-02-10 NOTE — H&P ADULT - ASSESSMENT
Patient is a 51 year old female with PMHx of DVT, Gout, HTN, Iron Deficiency Anemia, Obesity and SLE. Presents to [facility] by Rheumatologist for elevated INR.    Elevated INR:  INR 9.51 on admission  s/p Vitamin K x1  Cont to monitor    Left Shoulder Pain:  s/p Oxycodone 5MG PO x1 in ED  CT Shoulder with extensive dense surrounding subcutaneous inflammatory fat stranding versus contusion  which extends into the medial aspect of the upper arm and along the subclavian vessels. No soft tissue gas. No abscess.  Pain mgmt  Cont to monitor    Iron Deficiency Anemia:  H/H 8.3/26.3  Serial CBC's  Transfuse PRN  Cont to monitor     INDIA:  Bun/Cr 46/2.22  GFR 29  Monitor I/O's  Serial Cr  Avoid nephrotoxins  Renally dose medications  Continue to monitor    HTN:  BP stable  Cont home CV meds    Hx of DVT:  On Coumadin  Held for now  Cont to monitor INR    SLE:  Cont Prednisone     DVT ppx    [facility]  [phone]       Patient is a 51 year old female with PMHx of DVT, Gout, HTN, Iron Deficiency Anemia, Obesity and SLE. Presents to [facility] by Rheumatologist for elevated INR.    Elevated INR:  INR 9.51 on admission  s/p Vitamin K x1  Cont to monitor    Left Shoulder Pain:  s/p Oxycodone 5MG PO x1 in ED  CT Shoulder with extensive dense surrounding subcutaneous inflammatory fat stranding versus contusion  which extends into the medial aspect of the upper arm and along the subclavian vessels. No soft tissue gas. No abscess.  No acute fx or dislocation  Pain mgmt  Cont to monitor    Iron Deficiency Anemia:  H/H 8.3/26.3  Serial CBC's  Transfuse PRN  Cont to monitor     CKD  Bun/Cr 46/2.22  GFR 29  Monitor I/O's  Serial Cr  Avoid nephrotoxins  Renally dose medications  Continue to monitor    HTN:  BP stable  Cont home CV meds    Hx of DVT:  On Coumadin  Held for now  Cont to monitor INR    SLE:  Cont Prednisone     DVT ppx    [facility]  [phone]       Patient is a 51 year old female with PMHx of DVT, Gout, HTN, Iron Deficiency Anemia, Obesity and SLE. Presents to [facility] by Rheumatologist for elevated INR.    Elevated INR:  INR 9.51 on admission  s/p Vitamin K x1  Cont to monitor    Left Shoulder Pain:  s/p Oxycodone 5MG PO x1 in ED  CT Shoulder with extensive dense surrounding subcutaneous inflammatory fat stranding versus contusion  which extends into the medial aspect of the upper arm and along the subclavian vessels. No soft tissue gas. No abscess.  No acute fx or dislocation  Pain mgmt  Cont to monitor    Iron Deficiency Anemia:  H/H 8.3/26.3  Serial CBC's  Transfuse PRN  Cont to monitor     INDIA:  Bun/Cr 46/2.22  GFR 29  Monitor I/O's  Serial Cr  Avoid nephrotoxins  Renally dose medications  Continue to monitor    HTN:  BP stable  Cont home CV meds    Hx of DVT:  On Coumadin  Held for now  Cont to monitor INR    SLE:  Cont Prednisone     DVT ppx    [facility]  [phone]       Patient is a 51 year old female with PMHx of DVT, Gout, HTN, Iron Deficiency Anemia, Obesity and SLE. Presents to [facility] by Rheumatologist for elevated INR.    Elevated INR:  INR 9.51 on admission  s/p Vitamin K x1  Cont to monitor    Left Shoulder Pain:  s/p Oxycodone 5MG PO x1 in ED  CT Shoulder with extensive dense surrounding subcutaneous inflammatory fat stranding versus contusion  which extends into the medial aspect of the upper arm and along the subclavian vessels. No soft tissue gas. No abscess.  No acute fx or dislocation  Pain mgmt  Cont to monitor    Iron Deficiency Anemia:  H/H 8.3/26.3  Serial CBC's  Transfuse PRN  Cont to monitor     INDIA:  Bun/Cr 46/2.22  GFR 29  Monitor I/O's  Serial Cr  Avoid nephrotoxins  Renally dose medications  Continue to monitor    HTN:  BP stable  Cont home CV meds  Holding Losartan 2/2 INDIA    Hx of DVT:  On Coumadin  Held for now  Cont to monitor INR    SLE/Lupus Nephritis:  Cont Prednisone/Cellcept     DVT ppx    [facility]  [phone]

## 2022-02-10 NOTE — CONSULT NOTE ADULT - ATTENDING COMMENTS
L arm hematoma whole on A/C  motor and sensation are intact  CT reviewed  no indication for vascular surgery interventions

## 2022-02-10 NOTE — H&P ADULT - NSHPPHYSICALEXAM_GEN_ALL_CORE
Vital Signs Last 24 Hrs  T(C): 36.6 (10 Feb 2022 06:38), Max: 36.8 (09 Feb 2022 23:11)  T(F): 97.8 (10 Feb 2022 06:38), Max: 98.3 (09 Feb 2022 23:11)  HR: 88 (10 Feb 2022 06:38) (88 - 119)  BP: 120/76 (10 Feb 2022 06:38) (105/71 - 144/89)  BP(mean): --  RR: 18 (10 Feb 2022 06:38) (16 - 18)  SpO2: 100% (10 Feb 2022 06:38) (97% - 100%) Vital Signs Last 24 Hrs  T(C): 36.6 (10 Feb 2022 06:38), Max: 36.8 (09 Feb 2022 23:11)  T(F): 97.8 (10 Feb 2022 06:38), Max: 98.3 (09 Feb 2022 23:11)  HR: 88 (10 Feb 2022 06:38) (88 - 119)  BP: 120/76 (10 Feb 2022 06:38) (105/71 - 144/89)  BP(mean): --  RR: 18 (10 Feb 2022 06:38) (16 - 18)  SpO2: 100% (10 Feb 2022 06:38) (97% - 100%)    PHYSICAL EXAM:  GENERAL: NAD, well-developed, comfortable, obese  HEAD:  Atraumatic, Normocephalic  EYES: EOMI, PERRLA, conjunctiva and sclera clear  NECK: Supple, No JVD  CHEST/LUNG: Clear to auscultation bilaterally; No wheeze  HEART: Regular rate and rhythm; No murmurs, rubs, or gallops  ABDOMEN: Soft, Nontender, Nondistended; Bowel sounds present  NEURO: AAOx3, no focal weakness, 5/5 b/l extremity strength, b/l knee no arthritis, no effusion   EXTREMITIES:  2+ Peripheral Pulses, No clubbing, cyanosis, or edema, limited ROM to left shoulder, TTP around shoulder  SKIN: No rashes or lesions

## 2022-02-10 NOTE — ED PROVIDER NOTE - PROGRESS NOTE DETAILS
Obtain PT/PTT/INR w/ CT L shoulder. Oxycodone 5mg PO x 1 for pain. Pain mildly improved s/p oxy. CT shoulder w/ extensive  dense surrounding subcutaneous inflammatory fat stranding versus contusion. No soft tissue gas. No abscess. No hematoma. INR 9.5, will give vit K 10mg x1 and admit for pain control/ INR

## 2022-02-10 NOTE — CONSULT NOTE ADULT - SUBJECTIVE AND OBJECTIVE BOX
General Surgery Consult  Consulting surgical team: VASCULAR SURGERY  Consulting attending: Dr. Siddiqui    HPI:  51F hx of DVT, gout, HTN, GUADALUPE, obesity, SLE on coumadin sent in for elevated INR found to have INR of 9.5 --> 1.6 s/p vitK 10mg here and L axillary area hematoma. Vascular surgery consulted.    PAST MEDICAL HISTORY:  Hypertension    Obesity    SLE (systemic lupus erythematosus)    Lupus nephritis    Iron deficiency anemia    Abnormal uterine and vaginal bleeding, unspecified    Gout    DVT, lower extremity        PAST SURGICAL HISTORY:  Benign tumor        MEDICATIONS:  acetaminophen     Tablet .. 650 milliGRAM(s) Oral every 8 hours  cholecalciferol 5000 Unit(s) Oral daily  hydroxychloroquine 200 milliGRAM(s) Oral daily  mycophenolate mofetil 500 milliGRAM(s) Oral two times a day  oxyCODONE    IR 5 milliGRAM(s) Oral every 6 hours PRN  predniSONE   Tablet 10 milliGRAM(s) Oral daily  warfarin 3 milliGRAM(s) Oral once      ALLERGIES:  Imuran (Rash)      VITALS & I/Os:  Vital Signs Last 24 Hrs  T(C): 36.7 (10 Feb 2022 14:12), Max: 36.8 (09 Feb 2022 23:11)  T(F): 98 (10 Feb 2022 14:12), Max: 98.3 (09 Feb 2022 23:11)  HR: 99 (10 Feb 2022 14:12) (75 - 119)  BP: 134/94 (10 Feb 2022 14:12) (105/71 - 144/89)  BP(mean): --  RR: 18 (10 Feb 2022 14:12) (16 - 18)  SpO2: 99% (10 Feb 2022 14:12) (96% - 100%)    I&O's Summary      PHYSICAL EXAM:  General: No acute distress  Respiratory: Nonlabored  Cardiovascular: appears well perfused  Abdominal: Soft, nondistended, nontender. No rebound or guarding. No organomegaly, no palpable mass.  Extremities: Warm    LABS:                        8.4    12.63 )-----------( 204      ( 10 Feb 2022 14:15 )             26.8     02-10    136  |  110<H>  |  38<H>  ----------------------------<  101<H>  4.2   |  14<L>  |  1.94<H>    Ca    9.5      10 Feb 2022 14:15  Phos  3.0     02-10  Mg     1.6     02-10    TPro  6.6  /  Alb  3.6  /  TBili  0.2  /  DBili  x   /  AST  17  /  ALT  16  /  AlkPhos  119  02-10    Lactate:    PT/INR - ( 10 Feb 2022 14:15 )   PT: 19.0 sec;   INR: 1.62 ratio         PTT - ( 10 Feb 2022 14:15 )  PTT:40.2 sec              IMAGING:    ACC: [id number] EXAM:  CT 3D RECONSTRUCT GALDINO CALLE                        ACC: [id number] EXAM:  CT SHOULDER ONLY LT                          PROCEDURE DATE:  02/10/2022          INTERPRETATION:  HISTORY: Left shoulder pain and swelling since   yesterday. Elevated INR with bruising. Concern for hematoma.    Helical CT imaging of the left shoulder was performed without intravenous   contrast. Sagittal and coronal reformats were provided. 3-D reformats   were performed on a separate workstation.    No prior studies available for comparison.    FINDINGS:    There is no evidence of acute fracture or dislocation. The glenohumeral   and acromioclavicular joint spaces are maintained.    Surgical clips are seen within the left axillary region. Within the upper   left axillary region there is ill-defined intermediate to increased   attenuation which tracks along the axillary neurovascular structures   which may be related to ill-defined hematoma or nonspecific inflammatory   change. Correlation with clinical history and laboratory values is   recommended. This area spans a craniocaudal dimension of approximately   10.8 cm, in anteroposterior posterior dimension of approximately 3.8 cm,   and a transverse dimension of approximately 5.2 cm. There is no fatty   atrophy of the musculature. There is no subcutaneous air.    IMPRESSION:    Ill-defined intermediate to increased attenuation which tracks along the   axillary neurovascular structures in the upper axillary region. This may   be related to ill-defined hematoma or nonspecific inflammatory change.   Correlation with clinical history and laboratory values is recommended.   Follow-up imaging is recommended to confirm resolution exclude any   underlying mass.    No evidence of acute fracture or dislocation.    --- End of Report ---      KALINA MCLEOD MD; Attending Radiologist  This document has been electronically signed. Feb 10 2022  8:44AM                                                                                               General Surgery Consult  Consulting surgical team: VASCULAR SURGERY  Consulting attending: Dr. Siddiqui    HPI:  51F hx of RLE DVT 9/2021 on coumadin, gout, HTN, GUADALUPE, obesity, SLE sent in for elevated INR found to have INR of 9.5 --> 1.6 s/p vitK 10mg here and L axillary area hematoma. Vascular surgery consulted.    PAST MEDICAL HISTORY:  Hypertension    Obesity    SLE (systemic lupus erythematosus)    Lupus nephritis    Iron deficiency anemia    Abnormal uterine and vaginal bleeding, unspecified    Gout    DVT, lower extremity        PAST SURGICAL HISTORY:  Benign tumor        MEDICATIONS:  acetaminophen     Tablet .. 650 milliGRAM(s) Oral every 8 hours  cholecalciferol 5000 Unit(s) Oral daily  hydroxychloroquine 200 milliGRAM(s) Oral daily  mycophenolate mofetil 500 milliGRAM(s) Oral two times a day  oxyCODONE    IR 5 milliGRAM(s) Oral every 6 hours PRN  predniSONE   Tablet 10 milliGRAM(s) Oral daily  warfarin 3 milliGRAM(s) Oral once      ALLERGIES:  Imuran (Rash)      VITALS & I/Os:  Vital Signs Last 24 Hrs  T(C): 36.7 (10 Feb 2022 14:12), Max: 36.8 (09 Feb 2022 23:11)  T(F): 98 (10 Feb 2022 14:12), Max: 98.3 (09 Feb 2022 23:11)  HR: 99 (10 Feb 2022 14:12) (75 - 119)  BP: 134/94 (10 Feb 2022 14:12) (105/71 - 144/89)  BP(mean): --  RR: 18 (10 Feb 2022 14:12) (16 - 18)  SpO2: 99% (10 Feb 2022 14:12) (96% - 100%)    I&O's Summary      PHYSICAL EXAM:  General: No acute distress  Respiratory: Nonlabored  Cardiovascular: appears well perfused  Abdominal: Soft, nondistended, nontender. No rebound or guarding. No organomegaly, no palpable mass.  Extremities: Warm  decreased motor in L shoulder 2/2 pain, L lower arm and hand full strength 5/5 and intact ROM  sensation intact  L axillary fullness with tenderness    LABS:                        8.4    12.63 )-----------( 204      ( 10 Feb 2022 14:15 )             26.8     02-10    136  |  110<H>  |  38<H>  ----------------------------<  101<H>  4.2   |  14<L>  |  1.94<H>    Ca    9.5      10 Feb 2022 14:15  Phos  3.0     02-10  Mg     1.6     02-10    TPro  6.6  /  Alb  3.6  /  TBili  0.2  /  DBili  x   /  AST  17  /  ALT  16  /  AlkPhos  119  02-10    Lactate:    PT/INR - ( 10 Feb 2022 14:15 )   PT: 19.0 sec;   INR: 1.62 ratio         PTT - ( 10 Feb 2022 14:15 )  PTT:40.2 sec              IMAGING:    ACC: [id number] EXAM:  CT 3D RECONSTRUCT GALDINO CALLE                        ACC: [id number] EXAM:  CT SHOULDER ONLY LT                          PROCEDURE DATE:  02/10/2022          INTERPRETATION:  HISTORY: Left shoulder pain and swelling since   yesterday. Elevated INR with bruising. Concern for hematoma.    Helical CT imaging of the left shoulder was performed without intravenous   contrast. Sagittal and coronal reformats were provided. 3-D reformats   were performed on a separate workstation.    No prior studies available for comparison.    FINDINGS:    There is no evidence of acute fracture or dislocation. The glenohumeral   and acromioclavicular joint spaces are maintained.    Surgical clips are seen within the left axillary region. Within the upper   left axillary region there is ill-defined intermediate to increased   attenuation which tracks along the axillary neurovascular structures   which may be related to ill-defined hematoma or nonspecific inflammatory   change. Correlation with clinical history and laboratory values is   recommended. This area spans a craniocaudal dimension of approximately   10.8 cm, in anteroposterior posterior dimension of approximately 3.8 cm,   and a transverse dimension of approximately 5.2 cm. There is no fatty   atrophy of the musculature. There is no subcutaneous air.    IMPRESSION:    Ill-defined intermediate to increased attenuation which tracks along the   axillary neurovascular structures in the upper axillary region. This may   be related to ill-defined hematoma or nonspecific inflammatory change.   Correlation with clinical history and laboratory values is recommended.   Follow-up imaging is recommended to confirm resolution exclude any   underlying mass.    No evidence of acute fracture or dislocation.    --- End of Report ---      KALINA MCLEOD MD; Attending Radiologist  This document has been electronically signed. Feb 10 2022  8:44AM

## 2022-02-10 NOTE — CHART NOTE - NSCHARTNOTEFT_GEN_A_CORE
As per PMD Dr Guo  pt with  stable H/H she has a RLE DVT d jerri Sept 2021 her INR is now currently  subtherapeutic INR 1.6 .   Will restart  low dose  3mg coumadin tonight  as per PMD's ordered and watch H./H closely tomorrow morning.   Will avoid Lovenox for now due to pts INDIA/CKD. As per PMD Dr Guo  pt with  stable H/H she has a RLE DVT d jerri Sept 2021 her INR is now currently  subtherapeutic INR 1.6 .   Will restart  low dose  3mg coumadin tonight  as per PMD's ordered and watch H./H closely tomorrow morning.   Will avoid Lovenox for now due to pts INDIA/CKD.      Pt weight 130 kg  she takes coumaidn 10mg daily at home. She also  only has her INR checked every  2 months as outpt by her Rheumatologist.

## 2022-02-10 NOTE — H&P ADULT - HISTORY OF PRESENT ILLNESS
Patient is a 51 year old female with PMHx of DVT, Gout, HTN, Iron Deficiency Anemia, Obesity and SLE. Presents to [facility] by Rheumatologist for elevated INR. Patient states she went to see her Rheumatologist on Wednesday for a routine check up and was called later that evening with a result of an abnormal INR and was instructed to stop taking her Coumadin until Friday and then will repeat blood work. Patient then received another call to repeat her blood work to check for an error and again, patients INR was elevated. Patient also c/o of constant, sharp, 8/10 left shoulder pain associated with limited ROM. Denies any cp/sob, n/v/d, fever, chills, abdominal pain or changes in elimination patterns. Presents to ED for further evaluation.

## 2022-02-10 NOTE — ED PROVIDER NOTE - CARE PLAN
Detail Level: Zone Initiate Treatment: Triamcinolone cream 0.1% Apply twice daily to rash up to 2 weeks/month as needed 1 Principal Discharge DX:	Elevated INR  Secondary Diagnosis:	Left shoulder pain

## 2022-02-10 NOTE — ED PROVIDER NOTE - PHYSICAL EXAMINATION
GENERAL APPEARANCE: Well developed, NAD  HEENT:  PERRL, EOMI. hearing grossly intact.  NECK: Neck supple, non-tender no lymphadenopathy, masses or thyromegaly.  CARDIAC: Normal S1 and S2. no mrg. RRR  LUNGS: Clear to auscultation B/L, no rales, rhonchi, or wheezing  ABDOMEN: Soft , NTND, bowel sounds normal. No guarding or rebound.   MUSCULOSKELETAL: ROM limited in LUE 2/2 pain w. mild TTP around shoulder joint.  No appreciable joint erythema or swelling  EXTREMITIES: No edema. Peripheral pulses intact.   NEUROLOGICAL: Grossly Non focal. Strength limited in LUE 2/2 pain   SKIN: Warm and dry , Well perfused  PSYCHIATRIC: AOx3 , Normal mood and affect

## 2022-02-10 NOTE — ED ADULT NURSE NOTE - OBJECTIVE STATEMENT
50 Y/O F with hx of lupus presents to ED with c/o left arm swelling. Pt noticed left shoulder pain yesterday but thought she pulled a muscle. She noticed LUE edema throughout the day. Pt denies recent fall or trauma to the LUE. Positive active ROM and sensation to LUE. Pt denies numbness, tingling, calf pain, CP, SOB, fever, chills. Pt states she has hx of bilateral DVTs and is on warfarin. 52 Y/O F with hx of lupus presents to ED due to elevated INR (11.5) and c/o left arm swelling. Pt noticed left shoulder pain yesterday but thought she pulled a muscle. She noticed LUE edema throughout the day. Pt denies recent fall or trauma to the LUE. Positive active ROM and sensation to LUE. Pt denies numbness, tingling, calf pain, CP, SOB, fever, chills. Pt states she has hx of bilateral DVTs and is on warfarin.

## 2022-02-10 NOTE — H&P ADULT - NSHPREVIEWOFSYSTEMS_GEN_ALL_CORE
GENERAL: no weakness, no fever/chills, no weight loss/gain  EYES/ENT: No visual changes, no vertigo or throat pain  NECK: No pain or stiffness   RESPIRATORY: no cough, no wheezing, no hemoptysis, no dyspnea, no shortness of breath  CARDIOVASCULAR: no chest pain or palpitations  GASTROINTESTINAL: no n/v/d, no abdominal or epigastric pain  GENITOURINARY: no dysuria, no frequency, no nocturia, no hematuria  MUSCULOSKELETAL: no trauma, no sprain/strain, no myalgias, no arthralgias, no fracture, + left shoulder pain  NEUROLOGICAL: no HA, no dizziness, no weakness, no numbness  SKIN: No itching, rashes

## 2022-02-11 LAB
ANION GAP SERPL CALC-SCNC: 11 MMOL/L — SIGNIFICANT CHANGE UP (ref 5–17)
APTT BLD: 32.8 SEC — SIGNIFICANT CHANGE UP (ref 27.5–35.5)
BUN SERPL-MCNC: 35 MG/DL — HIGH (ref 7–23)
CALCIUM SERPL-MCNC: 9.4 MG/DL — SIGNIFICANT CHANGE UP (ref 8.4–10.5)
CHLORIDE SERPL-SCNC: 110 MMOL/L — HIGH (ref 96–108)
CO2 SERPL-SCNC: 14 MMOL/L — LOW (ref 22–31)
CREAT SERPL-MCNC: 1.89 MG/DL — HIGH (ref 0.5–1.3)
GLUCOSE SERPL-MCNC: 94 MG/DL — SIGNIFICANT CHANGE UP (ref 70–99)
HCT VFR BLD CALC: 24.1 % — LOW (ref 34.5–45)
HCT VFR BLD CALC: 24.1 % — LOW (ref 34.5–45)
HGB BLD-MCNC: 7.5 G/DL — LOW (ref 11.5–15.5)
HGB BLD-MCNC: 7.6 G/DL — LOW (ref 11.5–15.5)
INR BLD: 1.26 RATIO — HIGH (ref 0.88–1.16)
MCHC RBC-ENTMCNC: 23.4 PG — LOW (ref 27–34)
MCHC RBC-ENTMCNC: 23.8 PG — LOW (ref 27–34)
MCHC RBC-ENTMCNC: 31.1 GM/DL — LOW (ref 32–36)
MCHC RBC-ENTMCNC: 31.5 GM/DL — LOW (ref 32–36)
MCV RBC AUTO: 75.1 FL — LOW (ref 80–100)
MCV RBC AUTO: 75.3 FL — LOW (ref 80–100)
NRBC # BLD: 2 /100 WBCS — HIGH (ref 0–0)
NRBC # BLD: 2 /100 WBCS — HIGH (ref 0–0)
PLATELET # BLD AUTO: 202 K/UL — SIGNIFICANT CHANGE UP (ref 150–400)
PLATELET # BLD AUTO: 202 K/UL — SIGNIFICANT CHANGE UP (ref 150–400)
POTASSIUM SERPL-MCNC: 4.8 MMOL/L — SIGNIFICANT CHANGE UP (ref 3.5–5.3)
POTASSIUM SERPL-SCNC: 4.8 MMOL/L — SIGNIFICANT CHANGE UP (ref 3.5–5.3)
PROTHROM AB SERPL-ACNC: 15 SEC — HIGH (ref 10.6–13.6)
RBC # BLD: 3.2 M/UL — LOW (ref 3.8–5.2)
RBC # BLD: 3.21 M/UL — LOW (ref 3.8–5.2)
RBC # FLD: 21.1 % — HIGH (ref 10.3–14.5)
RBC # FLD: 21.2 % — HIGH (ref 10.3–14.5)
SODIUM SERPL-SCNC: 135 MMOL/L — SIGNIFICANT CHANGE UP (ref 135–145)
WBC # BLD: 10.65 K/UL — HIGH (ref 3.8–10.5)
WBC # BLD: 13.55 K/UL — HIGH (ref 3.8–10.5)
WBC # FLD AUTO: 10.65 K/UL — HIGH (ref 3.8–10.5)
WBC # FLD AUTO: 13.55 K/UL — HIGH (ref 3.8–10.5)

## 2022-02-11 RX ORDER — ONDANSETRON 8 MG/1
4 TABLET, FILM COATED ORAL ONCE
Refills: 0 | Status: COMPLETED | OUTPATIENT
Start: 2022-02-11 | End: 2022-02-11

## 2022-02-11 RX ORDER — HEPARIN SODIUM 5000 [USP'U]/ML
5000 INJECTION INTRAVENOUS; SUBCUTANEOUS EVERY 8 HOURS
Refills: 0 | Status: DISCONTINUED | OUTPATIENT
Start: 2022-02-11 | End: 2022-02-16

## 2022-02-11 RX ADMIN — HEPARIN SODIUM 5000 UNIT(S): 5000 INJECTION INTRAVENOUS; SUBCUTANEOUS at 21:13

## 2022-02-11 RX ADMIN — OXYCODONE HYDROCHLORIDE 5 MILLIGRAM(S): 5 TABLET ORAL at 18:28

## 2022-02-11 RX ADMIN — Medication 650 MILLIGRAM(S): at 21:13

## 2022-02-11 RX ADMIN — Medication 10 MILLIGRAM(S): at 05:03

## 2022-02-11 RX ADMIN — Medication 5000 UNIT(S): at 11:11

## 2022-02-11 RX ADMIN — Medication 650 MILLIGRAM(S): at 13:01

## 2022-02-11 RX ADMIN — ONDANSETRON 4 MILLIGRAM(S): 8 TABLET, FILM COATED ORAL at 22:25

## 2022-02-11 RX ADMIN — MYCOPHENOLATE MOFETIL 500 MILLIGRAM(S): 250 CAPSULE ORAL at 05:04

## 2022-02-11 RX ADMIN — OXYCODONE HYDROCHLORIDE 5 MILLIGRAM(S): 5 TABLET ORAL at 18:00

## 2022-02-11 RX ADMIN — Medication 650 MILLIGRAM(S): at 13:30

## 2022-02-11 RX ADMIN — Medication 650 MILLIGRAM(S): at 05:34

## 2022-02-11 RX ADMIN — Medication 200 MILLIGRAM(S): at 11:11

## 2022-02-11 RX ADMIN — Medication 650 MILLIGRAM(S): at 21:43

## 2022-02-11 RX ADMIN — MYCOPHENOLATE MOFETIL 500 MILLIGRAM(S): 250 CAPSULE ORAL at 18:01

## 2022-02-11 RX ADMIN — HEPARIN SODIUM 5000 UNIT(S): 5000 INJECTION INTRAVENOUS; SUBCUTANEOUS at 13:01

## 2022-02-11 RX ADMIN — Medication 650 MILLIGRAM(S): at 05:04

## 2022-02-11 NOTE — PROGRESS NOTE ADULT - ASSESSMENT
51 year old female with PMHx of DVT on AC, Gout, HTN, Iron Deficiency Anemia, Obesity, SLE on cellcept, plaquenil and prednisone. Presents to [facility] by Rheumatologist for elevated INR.    #Supratherapeutic INR - s/ vit k  INR down to 1.2- subtherapeutic now    #Left Shoulder Pain: ?hematoma axillary area ?involvement of neurovascular structures  CT Shoulder with extensive dense surrounding subcutaneous inflammatory fat stranding versus contusion  which extends into the medial aspect of the upper arm and along the subclavian vessels. No soft tissue gas. No abscess.  INR now subtherapeutic,   Hb downtrending, rpt hb at 4p  cont to hold coumadin  Pain mgmt  Vascular cs- no interventions    #Iron Deficiency Anemia:, now w blood loss anemia dt hematoma  Serial CBC's  Transfuse PRN  Cont to monitor     #SLE -resume home meds  on cellcept, plaquenil and prednisone.  rheum cs  fu    #INDIA on CKD baseline cr 1.6- lupus nephritis  Monitor I/O's  Serial Cr, improving  Avoid nephrotoxins  Renally dose medications  Continue to monitor    #HTN:  BP stable  Cont home CV meds  Holding Losartan 2/2 INDIA    #Hx of DVT dx 6/21  On Coumadin- will hold dt hematoma and downtrending Hb  checK LE doppler  Cont to monitor INR- suntherapeutic  start hep sq for dvt ppx  rheum cs fu( Dr Guerra)      [facility]  [phone]

## 2022-02-11 NOTE — PROGRESS NOTE ADULT - ASSESSMENT
51F RLE DVT 9/2021 on coumadin, SLE sent in for supratherapeutic INR found to have L axillary possible hematoma    Recommendations:  -okay to resume anticoagulation  - monitor H/H, hemodynamics, size of hematoma  -will follow    Discussed with vascular surgery fellow on behalf of Dr. Siddiqui    VASCULAR SURGERY  [id number]   51F RLE DVT 9/2021 on coumadin, SLE sent in for supratherapeutic INR found to have L axillary possible hematoma    Recommendations:  -okay to resume anticoagulation  - monitor H/H, hemodynamics, size of hematoma    Discussed with vascular surgery fellow on behalf of Dr. Siddiqui    VASCULAR SURGERY  [id number]

## 2022-02-12 LAB
APTT BLD: 29 SEC — SIGNIFICANT CHANGE UP (ref 27.5–35.5)
HCT VFR BLD CALC: 24.6 % — LOW (ref 34.5–45)
HGB BLD-MCNC: 7.6 G/DL — LOW (ref 11.5–15.5)
INR BLD: 1.15 RATIO — SIGNIFICANT CHANGE UP (ref 0.88–1.16)
MCHC RBC-ENTMCNC: 23.4 PG — LOW (ref 27–34)
MCHC RBC-ENTMCNC: 30.9 GM/DL — LOW (ref 32–36)
MCV RBC AUTO: 75.7 FL — LOW (ref 80–100)
NRBC # BLD: 3 /100 WBCS — HIGH (ref 0–0)
PLATELET # BLD AUTO: 195 K/UL — SIGNIFICANT CHANGE UP (ref 150–400)
PROTHROM AB SERPL-ACNC: 13.7 SEC — HIGH (ref 10.6–13.6)
RBC # BLD: 3.25 M/UL — LOW (ref 3.8–5.2)
RBC # FLD: 21 % — HIGH (ref 10.3–14.5)
WBC # BLD: 12.94 K/UL — HIGH (ref 3.8–10.5)
WBC # FLD AUTO: 12.94 K/UL — HIGH (ref 3.8–10.5)

## 2022-02-12 RX ORDER — WARFARIN SODIUM 2.5 MG/1
5 TABLET ORAL ONCE
Refills: 0 | Status: COMPLETED | OUTPATIENT
Start: 2022-02-12 | End: 2022-02-12

## 2022-02-12 RX ADMIN — Medication 650 MILLIGRAM(S): at 13:24

## 2022-02-12 RX ADMIN — Medication 650 MILLIGRAM(S): at 05:30

## 2022-02-12 RX ADMIN — Medication 5000 UNIT(S): at 12:10

## 2022-02-12 RX ADMIN — HEPARIN SODIUM 5000 UNIT(S): 5000 INJECTION INTRAVENOUS; SUBCUTANEOUS at 13:25

## 2022-02-12 RX ADMIN — HEPARIN SODIUM 5000 UNIT(S): 5000 INJECTION INTRAVENOUS; SUBCUTANEOUS at 05:43

## 2022-02-12 RX ADMIN — MYCOPHENOLATE MOFETIL 500 MILLIGRAM(S): 250 CAPSULE ORAL at 05:43

## 2022-02-12 RX ADMIN — Medication 650 MILLIGRAM(S): at 21:17

## 2022-02-12 RX ADMIN — Medication 200 MILLIGRAM(S): at 12:10

## 2022-02-12 RX ADMIN — Medication 10 MILLIGRAM(S): at 05:43

## 2022-02-12 RX ADMIN — MYCOPHENOLATE MOFETIL 500 MILLIGRAM(S): 250 CAPSULE ORAL at 17:23

## 2022-02-12 RX ADMIN — WARFARIN SODIUM 5 MILLIGRAM(S): 2.5 TABLET ORAL at 22:57

## 2022-02-12 RX ADMIN — Medication 650 MILLIGRAM(S): at 21:47

## 2022-02-12 RX ADMIN — Medication 650 MILLIGRAM(S): at 14:10

## 2022-02-12 RX ADMIN — Medication 650 MILLIGRAM(S): at 05:43

## 2022-02-12 RX ADMIN — HEPARIN SODIUM 5000 UNIT(S): 5000 INJECTION INTRAVENOUS; SUBCUTANEOUS at 21:16

## 2022-02-12 NOTE — PROGRESS NOTE ADULT - ASSESSMENT
51 year old female with PMHx of DVT on AC, Gout, HTN, Iron Deficiency Anemia, Obesity, SLE on cellcept, plaquenil and prednisone. Presents to [facility] by Rheumatologist for elevated INR.    #Supratherapeutic INR - s/ vit k  INR down to 1.2- subtherapeutic now    #Left Shoulder Pain: ?hematoma axillary area ?involvement of neurovascular structures  CT Shoulder with extensive dense surrounding subcutaneous inflammatory fat stranding versus contusion  which extends into the medial aspect of the upper arm and along the subclavian vessels. No soft tissue gas. No abscess.  INR now subtherapeutic,   Hb satble  will resume coumadin and closely monitor Hb  Pain mgmt  Vascular cs- no interventions    #Iron Deficiency Anemia:, now w blood loss anemia dt hematoma  Serial CBC's  Transfuse PRN fro symptoms or hb <7.5  Cont to monitor     #Heart burn/nausea- start PPI pt on chronic steroids  check EKG    #SLE -resume home meds  on cellcept, plaquenil and prednisone.  rheum cs  fu    #INDIA on CKD baseline cr 1.6- lupus nephritis  Monitor I/O's  Serial Cr, improving  Avoid nephrotoxins  Renally dose medications  Continue to monitor    #HTN:  BP stable  Cont home CV meds  Holding Losartan 2/2 [place]IA    #Hx of DVT dx 6/21  On Coumadin- will hold dt hematoma and downtrending Hb  checK LE doppler  Cont to monitor INR- subtherapeutic  start hep sq for dvt ppx  rheum cs fu( Dr Guerra)      [facility]  [phone]

## 2022-02-13 LAB
ANION GAP SERPL CALC-SCNC: 11 MMOL/L — SIGNIFICANT CHANGE UP (ref 5–17)
BLD GP AB SCN SERPL QL: NEGATIVE — SIGNIFICANT CHANGE UP
BUN SERPL-MCNC: 31 MG/DL — HIGH (ref 7–23)
CALCIUM SERPL-MCNC: 9.3 MG/DL — SIGNIFICANT CHANGE UP (ref 8.4–10.5)
CHLORIDE SERPL-SCNC: 108 MMOL/L — SIGNIFICANT CHANGE UP (ref 96–108)
CO2 SERPL-SCNC: 16 MMOL/L — LOW (ref 22–31)
CREAT SERPL-MCNC: 1.79 MG/DL — HIGH (ref 0.5–1.3)
GLUCOSE SERPL-MCNC: 130 MG/DL — HIGH (ref 70–99)
HCT VFR BLD CALC: 22.3 % — LOW (ref 34.5–45)
HCT VFR BLD CALC: 24.1 % — LOW (ref 34.5–45)
HGB BLD-MCNC: 6.9 G/DL — CRITICAL LOW (ref 11.5–15.5)
HGB BLD-MCNC: 7.4 G/DL — LOW (ref 11.5–15.5)
INR BLD: 1.17 RATIO — HIGH (ref 0.88–1.16)
MCHC RBC-ENTMCNC: 23.3 PG — LOW (ref 27–34)
MCHC RBC-ENTMCNC: 23.6 PG — LOW (ref 27–34)
MCHC RBC-ENTMCNC: 30.7 GM/DL — LOW (ref 32–36)
MCHC RBC-ENTMCNC: 30.9 GM/DL — LOW (ref 32–36)
MCV RBC AUTO: 75.8 FL — LOW (ref 80–100)
MCV RBC AUTO: 76.4 FL — LOW (ref 80–100)
NRBC # BLD: 4 /100 WBCS — HIGH (ref 0–0)
NRBC # BLD: 4 /100 WBCS — HIGH (ref 0–0)
PLATELET # BLD AUTO: 182 K/UL — SIGNIFICANT CHANGE UP (ref 150–400)
PLATELET # BLD AUTO: 187 K/UL — SIGNIFICANT CHANGE UP (ref 150–400)
POTASSIUM SERPL-MCNC: 4.3 MMOL/L — SIGNIFICANT CHANGE UP (ref 3.5–5.3)
POTASSIUM SERPL-SCNC: 4.3 MMOL/L — SIGNIFICANT CHANGE UP (ref 3.5–5.3)
PROTHROM AB SERPL-ACNC: 13.9 SEC — HIGH (ref 10.6–13.6)
RBC # BLD: 2.92 M/UL — LOW (ref 3.8–5.2)
RBC # BLD: 3.18 M/UL — LOW (ref 3.8–5.2)
RBC # FLD: 21 % — HIGH (ref 10.3–14.5)
RBC # FLD: 21.2 % — HIGH (ref 10.3–14.5)
RH IG SCN BLD-IMP: POSITIVE — SIGNIFICANT CHANGE UP
SODIUM SERPL-SCNC: 135 MMOL/L — SIGNIFICANT CHANGE UP (ref 135–145)
WBC # BLD: 9.48 K/UL — SIGNIFICANT CHANGE UP (ref 3.8–10.5)
WBC # BLD: 9.78 K/UL — SIGNIFICANT CHANGE UP (ref 3.8–10.5)
WBC # FLD AUTO: 9.48 K/UL — SIGNIFICANT CHANGE UP (ref 3.8–10.5)
WBC # FLD AUTO: 9.78 K/UL — SIGNIFICANT CHANGE UP (ref 3.8–10.5)

## 2022-02-13 PROCEDURE — 99223 1ST HOSP IP/OBS HIGH 75: CPT | Mod: GC

## 2022-02-13 PROCEDURE — 93970 EXTREMITY STUDY: CPT | Mod: 26

## 2022-02-13 RX ORDER — WARFARIN SODIUM 2.5 MG/1
5 TABLET ORAL ONCE
Refills: 0 | Status: COMPLETED | OUTPATIENT
Start: 2022-02-13 | End: 2022-02-13

## 2022-02-13 RX ADMIN — MYCOPHENOLATE MOFETIL 500 MILLIGRAM(S): 250 CAPSULE ORAL at 17:29

## 2022-02-13 RX ADMIN — HEPARIN SODIUM 5000 UNIT(S): 5000 INJECTION INTRAVENOUS; SUBCUTANEOUS at 21:07

## 2022-02-13 RX ADMIN — Medication 10 MILLIGRAM(S): at 05:08

## 2022-02-13 RX ADMIN — HEPARIN SODIUM 5000 UNIT(S): 5000 INJECTION INTRAVENOUS; SUBCUTANEOUS at 05:07

## 2022-02-13 RX ADMIN — WARFARIN SODIUM 5 MILLIGRAM(S): 2.5 TABLET ORAL at 21:07

## 2022-02-13 RX ADMIN — Medication 650 MILLIGRAM(S): at 14:40

## 2022-02-13 RX ADMIN — HEPARIN SODIUM 5000 UNIT(S): 5000 INJECTION INTRAVENOUS; SUBCUTANEOUS at 13:59

## 2022-02-13 RX ADMIN — Medication 650 MILLIGRAM(S): at 21:37

## 2022-02-13 RX ADMIN — Medication 200 MILLIGRAM(S): at 11:23

## 2022-02-13 RX ADMIN — Medication 650 MILLIGRAM(S): at 05:07

## 2022-02-13 RX ADMIN — MYCOPHENOLATE MOFETIL 500 MILLIGRAM(S): 250 CAPSULE ORAL at 05:08

## 2022-02-13 RX ADMIN — Medication 5000 UNIT(S): at 12:26

## 2022-02-13 RX ADMIN — Medication 650 MILLIGRAM(S): at 21:07

## 2022-02-13 RX ADMIN — Medication 650 MILLIGRAM(S): at 05:37

## 2022-02-13 RX ADMIN — OXYCODONE HYDROCHLORIDE 5 MILLIGRAM(S): 5 TABLET ORAL at 04:15

## 2022-02-13 RX ADMIN — OXYCODONE HYDROCHLORIDE 5 MILLIGRAM(S): 5 TABLET ORAL at 03:45

## 2022-02-13 RX ADMIN — Medication 650 MILLIGRAM(S): at 14:00

## 2022-02-13 NOTE — CONSULT NOTE ADULT - ASSESSMENT
52 yo F with PMHx significant for SLE, Lupus Nephritis, Two DVTs on Warfarin presented with elevated INR and Left shoulder pain. CT shoulder suggestive of hematoma. No signs or symptoms of SLE flare. C3, C4, DSDNA levels appropriate a few days ago. Patient requires lifelong anticoagulation for multiple DVTs. No formal diagnosis of APLS but risk for APLS elevated with SLE.    Plan  - recommend continuation of Warfarin unless concern for active bleeding  - monitor hemoglobin, check  hemolysis labs,  transfuse for hgb >7  - Monitoring of Left shoulder hematoma; f/u US left shoulder/axillary area  - Vascular recs appreciated  - check APLS serologies  - f/u with Dr. Guerra outpatient      Will order above labs    Discussed with Dr. Funes, Attending    Chai Guajardo MD  Rheumatology Fellow, PGY-4  Pager: [phone] 50 yo F with PMHx significant for SLE, Lupus Nephritis, Two DVTs on Warfarin presented with elevated INR and Left shoulder pain. CT shoulder suggestive of hematoma. No signs or symptoms of SLE flare. C3, C4, DSDNA levels appropriate a few days ago. Patient requires lifelong anticoagulation for multiple DVTs. No formal diagnosis of APLS but risk for APLS elevated with SLE.    Plan  - recommend continuation of Warfarin unless concern for active bleeding  - monitor hemoglobin, check  hemolysis labs,  transfuse for hgb >7  - Monitoring of Left shoulder hematoma; f/u US left shoulder/axillary area  - Vascular recs appreciated  - check APLS serologies  - Home Cellcept dose is 2,500 mg daily divided in two doses (AM and PM)  - f/u with Dr. Guerra outpatient      Will order above labs    Discussed with Dr. Funes, Attending    Chai Guajardo MD  Rheumatology Fellow, PGY-4  Pager: [phone]

## 2022-02-13 NOTE — PROGRESS NOTE ADULT - ASSESSMENT
51 year old female with PMHx of DVT on AC, Gout, HTN, Iron Deficiency Anemia, Obesity, SLE on cellcept, plaquenil and prednisone. Presents to [facility] by Rheumatologist for elevated INR.    #Supratherapeutic INR - s/ vit k  INR down to 1.2- subtherapeutic now    #Left Shoulder Pain: ?hematoma axillary area ?involvement of neurovascular structures  CT Shoulder with extensive dense surrounding subcutaneous inflammatory fat stranding versus contusion  which extends into the medial aspect of the upper arm and along the subclavian vessels. No soft tissue gas. No abscess.  INR now subtherapeutic,   Hb satble  will hold coumadin and closely monitor Hb  Pain mgmt  Vascular cs- no interventions    #Iron Deficiency Anemia:,  w blood loss anemia dt hematoma  Serial CBC's  Transfuse PRN fro symptoms or hb <7.5  Cont to monitor     #Heart burn/nausea- start PPI pt on chronic steroids  check EKG reviewed    #SLE -resume home meds  on cellcept, plaquenil and prednisone.  rheum cs  fu    #INDIA on CKD baseline cr 1.6- lupus nephritis  Monitor I/O's  Serial Cr, improving  Avoid nephrotoxins  Renally dose medications  Continue to monitor    #HTN:  BP stable  Cont home CV meds  Holding Losartan 2/2 [place]IA    #Hx of DVT dx 6/21  On Coumadin- will hold dt hematoma and downtrending Hb  checK LE doppler neg DVT  Cont to monitor INR- subtherapeutic  start hep sq for dvt ppx  rheum cs fu( Dr Guerra)      [facility]  [phone]

## 2022-02-13 NOTE — CHART NOTE - NSCHARTNOTEFT_GEN_A_CORE
Patient seen and examined at bedside. Note to have some pain in the left shoulder to lower left axillary region. On exam, soft fluid filled area noted in axillary region.  Recommend obtaining duplex to reevaulate size of hematoma.

## 2022-02-13 NOTE — PROVIDER CONTACT NOTE (OTHER) - SITUATION
Needed bloodwork for verification of warfarin from pharmacy, confirming with NP both to be given tonight

## 2022-02-13 NOTE — CONSULT NOTE ADULT - SUBJECTIVE AND OBJECTIVE BOX
LEATHA Lansdowne  [id number]    HISTORY OF PRESENT ILLNESS:  Patient is a 51 year old female with PMHx of DVT, Gout, HTN, Iron Deficiency Anemia, Obesity and SLE. Presents to [facility] by Rheumatologist for elevated INR. Patient states she went to see her Rheumatologist on Wednesday for a routine check up and was called later that evening with a result of an abnormal INR and was instructed to stop taking her Coumadin until Friday and then will repeat blood work. Patient then received another call to repeat her blood work to check for an error and again, patients INR was elevated. Patient also c/o of constant, sharp, 8/10 left shoulder pain associated with limited ROM. Denies any cp/sob, n/v/d, fever, chills, abdominal pain or changes in elimination patterns. Presents to ED for further evaluation.      Rheumatology consulted for hx of SLE and on AC. As per above, had elevated INR and left shoulder pain and imaging findings suggestive of hematoma.  She denied any other complaints. No trauma to left shoulder. She had COVID 19 about a month ago.  She has had two below the knee DVTs in the past. She had been put on Warfarin to cover her for APLS. Patient's shoulder pain had been improving but yesterday she noticed more swelling and pain on movement. She denies fevers, chills, rash, oral ulcers,  arthralgias, chest pain, shortness of breath, abdominal pain, n/v/d, dysuria.    SLE and LN Hx:   SLE dx in 2014 consisting of arthritis, nasal ulcers, Raynauds, alopecia, rash, and nephritis. 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 She has been treated with CellCept and remained on 2500 mg daily.        PAST MEDICAL & SURGICAL HISTORY:  Hypertension    Obesity    SLE (systemic lupus erythematosus)    Lupus nephritis    Iron deficiency anemia    Gout    DVT, lower extremity  left leg 2012, resolved    Benign tumor  h/o right thumb        Review of Systems:  Gen:  No fevers/chills, weight loss  HEENT: No blurry vision, no difficulty swallowing  CVS: No chest pain/palpitations  Resp: No SOB/wheezing  GI: No N/V/C/D/abdominal pain  MSK: Left shoulder pain  Skin: No new rashes  Neuro: No headaches    MEDICATIONS  (STANDING):  acetaminophen     Tablet .. 650 milliGRAM(s) Oral every 8 hours  cholecalciferol 5000 Unit(s) Oral daily  heparin   Injectable 5000 Unit(s) SubCutaneous every 8 hours  hydroxychloroquine 200 milliGRAM(s) Oral daily  mycophenolate mofetil 500 milliGRAM(s) Oral two times a day  predniSONE   Tablet 10 milliGRAM(s) Oral daily    MEDICATIONS  (PRN):  oxyCODONE    IR 5 milliGRAM(s) Oral every 6 hours PRN Severe Pain (7 - 10)      Allergies    Imuran (Rash)    Intolerances        PERTINENT MEDICATION HISTORY:    SOCIAL HISTORY:  Denies toxic habits  FAMILY HISTORY:  Family history of heart disease    Family history of breast cancer (Sibling)        Vital Signs Last 24 Hrs  T(C): 36.4 (13 Feb 2022 18:47), Max: 37.1 (13 Feb 2022 18:27)  T(F): 97.6 (13 Feb 2022 18:47), Max: 98.7 (13 Feb 2022 18:27)  HR: 87 (13 Feb 2022 18:47) (71 - 87)  BP: 135/98 (13 Feb 2022 18:47) (112/76 - 143/86)  BP(mean): --  RR: 18 (13 Feb 2022 18:47) (18 - 18)  SpO2: 97% (13 Feb 2022 18:47) (97% - 99%)    Daily     Daily     Physical Exam:  General: No apparent distress  HEENT: EOMI, MMM  CVS: +S1/S2, RRR  Resp: CTA b/l  GI: Soft, NT/ND  MSK: Left shoulder with reduced ROM 2/2 pain; increased fullness in left axillary region   Neuro: AAOx3  Skin: no  acute rashes    LABS:                        7.4    9.48  )-----------( 187      ( 13 Feb 2022 10:37 )             24.1     02-13    135  |  108  |  31<H>  ----------------------------<  130<H>  4.3   |  16<L>  |  1.79<H>    Ca    9.3      13 Feb 2022 10:37      PT/INR - ( 13 Feb 2022 07:38 )   PT: 13.9 sec;   INR: 1.17 ratio         PTT - ( 12 Feb 2022 22:04 )  PTT:29.0 sec      RADIOLOGY & ADDITIONAL STUDIES:  < from: CT 3D Reconstruct w/ Workstation (02.10.22 @ 01:46) >    ACC: [id number] EXAM:  CT 3D RECONSTRUCT W ALVA                        ACC: [id number] EXAM:  CT SHOULDER ONLY LT                          PROCEDURE DATE:  02/10/2022          INTERPRETATION:  HISTORY: Left shoulder pain and swelling since   yesterday. Elevated INR with bruising. Concern for hematoma.    Helical CT imaging of the left shoulder was performed without intravenous   contrast. Sagittal and coronal reformats were provided. 3-D reformats   were performed on a separate workstation.    No prior studies available for comparison.    FINDINGS:    There is no evidence of acute fracture or dislocation. The glenohumeral   and acromioclavicular joint spaces are maintained.    Surgical clips are seen within the left axillary region. Withinthe upper   left axillary region there is ill-defined intermediate to increased   attenuation which tracks along the axillary neurovascular structures   which may be related to ill-defined hematoma or nonspecific inflammatory   change. Correlation with clinical history and laboratory values is   recommended. This area spans a craniocaudal dimension of approximately   10.8 cm, in anteroposterior posterior dimension of approximately 3.8 cm,   and a transverse dimension of approximately 5.2 cm. There is no fatty   atrophy of the musculature. There is no subcutaneous air.    IMPRESSION:    Ill-defined intermediate to increased attenuation which tracks along the   axillary neurovascular structures in the upper axillary region. This may   be related to ill-defined hematoma or nonspecific inflammatory change.   Correlation with clinical history and laboratory values is recommended.   Follow-up imaging is recommended to confirm resolution exclude any   underlying mass.    No evidence of acute fracture or dislocation.    --- End of Report ---        < end of copied text >

## 2022-02-14 LAB
ANION GAP SERPL CALC-SCNC: 15 MMOL/L — SIGNIFICANT CHANGE UP (ref 5–17)
BUN SERPL-MCNC: 31 MG/DL — HIGH (ref 7–23)
CALCIUM SERPL-MCNC: 9.3 MG/DL — SIGNIFICANT CHANGE UP (ref 8.4–10.5)
CHLORIDE SERPL-SCNC: 110 MMOL/L — HIGH (ref 96–108)
CO2 SERPL-SCNC: 14 MMOL/L — LOW (ref 22–31)
CREAT SERPL-MCNC: 1.79 MG/DL — HIGH (ref 0.5–1.3)
GLUCOSE SERPL-MCNC: 73 MG/DL — SIGNIFICANT CHANGE UP (ref 70–99)
HAPTOGLOB SERPL-MCNC: 76 MG/DL — SIGNIFICANT CHANGE UP (ref 34–200)
HCT VFR BLD CALC: 26.6 % — LOW (ref 34.5–45)
HGB BLD-MCNC: 8.4 G/DL — LOW (ref 11.5–15.5)
INR BLD: 1.16 RATIO — SIGNIFICANT CHANGE UP (ref 0.88–1.16)
LDH SERPL L TO P-CCNC: 421 U/L — HIGH (ref 50–242)
MCHC RBC-ENTMCNC: 24.2 PG — LOW (ref 27–34)
MCHC RBC-ENTMCNC: 31.6 GM/DL — LOW (ref 32–36)
MCV RBC AUTO: 76.7 FL — LOW (ref 80–100)
NRBC # BLD: 4 /100 WBCS — HIGH (ref 0–0)
PLATELET # BLD AUTO: 187 K/UL — SIGNIFICANT CHANGE UP (ref 150–400)
POTASSIUM SERPL-MCNC: 4.5 MMOL/L — SIGNIFICANT CHANGE UP (ref 3.5–5.3)
POTASSIUM SERPL-SCNC: 4.5 MMOL/L — SIGNIFICANT CHANGE UP (ref 3.5–5.3)
PROTHROM AB SERPL-ACNC: 13.8 SEC — HIGH (ref 10.6–13.6)
RBC # BLD: 3.47 M/UL — LOW (ref 3.8–5.2)
RBC # FLD: 21.2 % — HIGH (ref 10.3–14.5)
SODIUM SERPL-SCNC: 139 MMOL/L — SIGNIFICANT CHANGE UP (ref 135–145)
WBC # BLD: 10.48 K/UL — SIGNIFICANT CHANGE UP (ref 3.8–10.5)
WBC # FLD AUTO: 10.48 K/UL — SIGNIFICANT CHANGE UP (ref 3.8–10.5)

## 2022-02-14 PROCEDURE — 76882 US LMTD JT/FCL EVL NVASC XTR: CPT | Mod: 26,LT

## 2022-02-14 RX ADMIN — HEPARIN SODIUM 5000 UNIT(S): 5000 INJECTION INTRAVENOUS; SUBCUTANEOUS at 05:06

## 2022-02-14 RX ADMIN — OXYCODONE HYDROCHLORIDE 5 MILLIGRAM(S): 5 TABLET ORAL at 21:45

## 2022-02-14 RX ADMIN — Medication 650 MILLIGRAM(S): at 05:06

## 2022-02-14 RX ADMIN — HEPARIN SODIUM 5000 UNIT(S): 5000 INJECTION INTRAVENOUS; SUBCUTANEOUS at 15:38

## 2022-02-14 RX ADMIN — Medication 10 MILLIGRAM(S): at 05:06

## 2022-02-14 RX ADMIN — MYCOPHENOLATE MOFETIL 500 MILLIGRAM(S): 250 CAPSULE ORAL at 05:06

## 2022-02-14 RX ADMIN — HEPARIN SODIUM 5000 UNIT(S): 5000 INJECTION INTRAVENOUS; SUBCUTANEOUS at 21:45

## 2022-02-14 RX ADMIN — OXYCODONE HYDROCHLORIDE 5 MILLIGRAM(S): 5 TABLET ORAL at 14:21

## 2022-02-14 RX ADMIN — OXYCODONE HYDROCHLORIDE 5 MILLIGRAM(S): 5 TABLET ORAL at 22:15

## 2022-02-14 RX ADMIN — Medication 5000 UNIT(S): at 15:38

## 2022-02-14 RX ADMIN — Medication 650 MILLIGRAM(S): at 06:53

## 2022-02-14 RX ADMIN — OXYCODONE HYDROCHLORIDE 5 MILLIGRAM(S): 5 TABLET ORAL at 02:00

## 2022-02-14 RX ADMIN — Medication 200 MILLIGRAM(S): at 15:38

## 2022-02-14 RX ADMIN — OXYCODONE HYDROCHLORIDE 5 MILLIGRAM(S): 5 TABLET ORAL at 02:30

## 2022-02-14 RX ADMIN — MYCOPHENOLATE MOFETIL 500 MILLIGRAM(S): 250 CAPSULE ORAL at 18:47

## 2022-02-14 NOTE — PROGRESS NOTE ADULT - ASSESSMENT
51F RLE DVT 9/2021 on coumadin, SLE sent in for supratherapeutic INR found to have L axillary possible hematoma    Recommendations:  - no acute vascular surgery intervention, exam stable without neurovascular deficits  - monitor H/H, hemodynamics, size of hematoma    VASCULAR SURGERY  [id number]

## 2022-02-14 NOTE — PROGRESS NOTE ADULT - ASSESSMENT
51 year old female with PMHx of DVT on AC, Gout, HTN, Iron Deficiency Anemia, Obesity, SLE on cellcept, plaquenil and prednisone. Presents to [facility] by Rheumatologist for elevated INR.    #Supratherapeutic INR - s/ vit k  INR down to 1.2- subtherapeutic now    #Left Shoulder Pain: ?hematoma axillary area -involvement of neurovascular structures  CT Shoulder with extensive dense surrounding subcutaneous inflammatory fat stranding versus contusion  which extends into the medial aspect of the upper arm and along the subclavian vessels. No soft tissue gas. No abscess.  INR now subtherapeutic,   Hb stable post transfusion  resume coumadin and closely monitor Hb  Pain mgmt  Vascular cs- no interventions  US lt UE-to eval hematoma- none seen    #Iron Deficiency Anemia:,  w blood loss anemia dt hematoma  Serial CBC's  Transfuse PRN fro symptoms or hb <7.5  Cont to monitor - sp1u prbc 2/13    #Heart burn/nausea- resolved  started PPI pt on chronic steroids  check EKG reviewed- no ischemic  changes    #SLE -resume home meds  on cellcept, plaquenil and prednisone.  rheum cs  fu    #INDIA on CKD baseline cr 1.6- lupus nephritis  Monitor I/O's  Serial Cr, improving  Avoid nephrotoxins  Renally dose medications  Continue to monitor    #HTN:  BP stable  Cont home CV meds  Holding Losartan 2/2 INDIA    #Hx of DVT dx 6/21  On Coumadin- will hold dt hematoma and downtrending Hb  checK LE doppler neg DVT  Cont to monitor INR- subtherapeutic  start hep sq for dvt ppx  rheum cs fu( Dr Guerra)      [facility]  [phone]

## 2022-02-15 LAB
B2 GLYCOPROT1 AB SER QL: NEGATIVE — SIGNIFICANT CHANGE UP
CARDIOLIPIN AB SER-ACNC: NEGATIVE — SIGNIFICANT CHANGE UP

## 2022-02-15 RX ORDER — ACETAMINOPHEN 500 MG
650 TABLET ORAL ONCE
Refills: 0 | Status: COMPLETED | OUTPATIENT
Start: 2022-02-15 | End: 2022-02-15

## 2022-02-15 RX ORDER — ACETAMINOPHEN 500 MG
650 TABLET ORAL EVERY 6 HOURS
Refills: 0 | Status: DISCONTINUED | OUTPATIENT
Start: 2022-02-15 | End: 2022-02-16

## 2022-02-15 RX ORDER — WARFARIN SODIUM 2.5 MG/1
5 TABLET ORAL ONCE
Refills: 0 | Status: COMPLETED | OUTPATIENT
Start: 2022-02-15 | End: 2022-02-15

## 2022-02-15 RX ADMIN — HEPARIN SODIUM 5000 UNIT(S): 5000 INJECTION INTRAVENOUS; SUBCUTANEOUS at 05:06

## 2022-02-15 RX ADMIN — HEPARIN SODIUM 5000 UNIT(S): 5000 INJECTION INTRAVENOUS; SUBCUTANEOUS at 13:22

## 2022-02-15 RX ADMIN — Medication 650 MILLIGRAM(S): at 23:12

## 2022-02-15 RX ADMIN — Medication 10 MILLIGRAM(S): at 05:06

## 2022-02-15 RX ADMIN — HEPARIN SODIUM 5000 UNIT(S): 5000 INJECTION INTRAVENOUS; SUBCUTANEOUS at 22:43

## 2022-02-15 RX ADMIN — WARFARIN SODIUM 5 MILLIGRAM(S): 2.5 TABLET ORAL at 22:42

## 2022-02-15 RX ADMIN — OXYCODONE HYDROCHLORIDE 5 MILLIGRAM(S): 5 TABLET ORAL at 05:35

## 2022-02-15 RX ADMIN — OXYCODONE HYDROCHLORIDE 5 MILLIGRAM(S): 5 TABLET ORAL at 05:05

## 2022-02-15 RX ADMIN — Medication 200 MILLIGRAM(S): at 11:45

## 2022-02-15 RX ADMIN — Medication 650 MILLIGRAM(S): at 00:40

## 2022-02-15 RX ADMIN — Medication 650 MILLIGRAM(S): at 11:44

## 2022-02-15 RX ADMIN — Medication 650 MILLIGRAM(S): at 00:20

## 2022-02-15 RX ADMIN — Medication 650 MILLIGRAM(S): at 12:14

## 2022-02-15 RX ADMIN — MYCOPHENOLATE MOFETIL 500 MILLIGRAM(S): 250 CAPSULE ORAL at 17:08

## 2022-02-15 RX ADMIN — Medication 650 MILLIGRAM(S): at 22:42

## 2022-02-15 RX ADMIN — MYCOPHENOLATE MOFETIL 500 MILLIGRAM(S): 250 CAPSULE ORAL at 05:11

## 2022-02-15 RX ADMIN — Medication 5000 UNIT(S): at 11:45

## 2022-02-15 NOTE — DIETITIAN NUTRITION RISK NOTIFICATION - TREATMENT: THE FOLLOWING DIET HAS BEEN RECOMMENDED
Diet, Regular:   DASH/TLC {Sodium & Cholesterol Restricted} (DASH)  Low Sodium (02-10-22 @ 08:44) [Active]

## 2022-02-15 NOTE — PHYSICAL THERAPY INITIAL EVALUATION ADULT - PRECAUTIONS/LIMITATIONS, REHAB EVAL
Patient also c/o of constant, sharp, 8/10 left shoulder pain associated with limited ROM.  CT shoulder: Ill-defined hematoma or nonspecific inflammatory change.

## 2022-02-15 NOTE — DIETITIAN INITIAL EVALUATION ADULT. - PERTINENT MEDS FT
MEDICATIONS  (STANDING):  cholecalciferol 5000 Unit(s) Oral daily  heparin   Injectable 5000 Unit(s) SubCutaneous every 8 hours  hydroxychloroquine 200 milliGRAM(s) Oral daily  mycophenolate mofetil 500 milliGRAM(s) Oral two times a day  predniSONE   Tablet 10 milliGRAM(s) Oral daily    MEDICATIONS  (PRN):  oxyCODONE    IR 5 milliGRAM(s) Oral every 6 hours PRN Severe Pain (7 - 10)

## 2022-02-15 NOTE — DIETITIAN INITIAL EVALUATION ADULT. - REASON FOR ADMISSION
Pt is 52 y/o F with PMH as per chart: "DVT on AC, Gout, HTN, Iron Deficiency Anemia, Obesity, SLE on cellcept, plaquenil and prednisone. Presents to [facility] by Rheumatologist for elevated INR."

## 2022-02-15 NOTE — CHART NOTE - NSCHARTNOTEFT_GEN_A_CORE
Chart reviewed with attending    Patient is a 51 year old female with PMHx of DVT, Gout, HTN, Iron Deficiency Anemia, Obesity and SLE. Presented to [facility] by Rheumatologist for elevated INR. CT shoulder suggestive of hematoma. No signs or symptoms of SLE flare at time of admission. C3, C4, DSDNA levels appropriate a few days ago. Lysis labs, ACL negative.    -Patient requires lifelong anticoagulation for multiple DVTs with warfarin, no formal diagnosis of APLS but risk for APLS elevated with SLE.  - patient should continue Warfarin when there is no contraindication (i.e., active bleeding)  - continue home immunosuppression: Cellcept dose is 2,500 mg daily divided in two doses (AM and PM), PO prednisine 10mg/day  - continue home Plaquenil 200mg weekdays and 400mg weekends  - patient will require follow up with Dr. Guerra upon discharge from the hospital. Address: [address] [place] [address] [address] [address]. Phone: [phone]    Rheumatology will sign off. Please re-consult if there are questions.   Plan discussed with Dr. Xiomy Romero DO  Rheumatology Fellow PGY 4  Pager [phone] Chart reviewed with attending    Patient is a 51 year old female with PMHx of DVT, Gout, HTN, Iron Deficiency Anemia, Obesity and SLE. Presented to [facility] by Rheumatologist for elevated INR. CT shoulder suggestive of hematoma. No signs or symptoms of SLE flare at time of admission. C3, C4, DSDNA levels appropriate a few days ago. Lysis labs, ACL negative.    -Patient requires lifelong anticoagulation for multiple DVTs with warfarin. 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 b2gp IgA is not part of the [place] criteria for APLS. However, given clinical content, I feel confidence pt has APLS.   - patient should continue Warfarin when there is no contraindication (i.e., active bleeding)  - continue home immunosuppression: Cellcept dose is 2,500 mg daily divided in two doses (AM and PM), PO prednisine 10mg/day  - continue home Plaquenil 200mg weekdays and 400mg weekends  - patient will require follow up with Dr. Guerra upon discharge from the hospital. Address: [address] [place] [address] #[facility], [place] [address]. Phone: [phone]    Rheumatology will sign off. Please re-consult if there are questions.   Plan discussed with Dr. Xiomy Romero DO  Rheumatology Fellow PGY 4  Pager [phone]

## 2022-02-15 NOTE — DIETITIAN INITIAL EVALUATION ADULT. - OTHER CALCULATIONS
Dosing wt 279.9 pounds used for calorie need calculation;  pounds (BMI 41.3) used for protein needs calculation. Defer fluids for team per fluid accumulation status

## 2022-02-15 NOTE — PROGRESS NOTE ADULT - SUBJECTIVE AND OBJECTIVE BOX
Patient is a 51y old  Female who presents with a chief complaint of abnormal lab result (14 Feb 2022 08:58)      INTERVAL HPI/OVERNIGHT EVENTS: noted  pt seen and examined this am   events noted  lt shoulder pain persists      Vital Signs Last 24 Hrs  T(C): 37.1 (14 Feb 2022 14:01), Max: 37.1 (13 Feb 2022 18:27)  T(F): 98.8 (14 Feb 2022 14:01), Max: 98.8 (14 Feb 2022 14:01)  HR: 89 (14 Feb 2022 14:01) (84 - 93)  BP: 126/82 (14 Feb 2022 14:01) (106/74 - 135/98)  BP(mean): --  RR: 18 (14 Feb 2022 14:01) (17 - 18)  SpO2: 97% (14 Feb 2022 14:01) (95% - 99%)    cholecalciferol 5000 Unit(s) Oral daily  heparin   Injectable 5000 Unit(s) SubCutaneous every 8 hours  hydroxychloroquine 200 milliGRAM(s) Oral daily  mycophenolate mofetil 500 milliGRAM(s) Oral two times a day  oxyCODONE    IR 5 milliGRAM(s) Oral every 6 hours PRN  predniSONE   Tablet 10 milliGRAM(s) Oral daily      PHYSICAL EXAM:  GENERAL: NAD,   EYES: conjunctiva and sclera clear  ENMT: Moist mucous membranes  NECK: Supple, No JVD, Normal thyroid  CHEST/LUNG: non labored, cta b/l  HEART: Regular rate and rhythm; No murmurs, rubs, or gallops  ABDOMEN: Soft, Nontender, Nondistended; Bowel sounds present  EXTREMITIES:  2+ Peripheral Pulses, No clubbing, cyanosis, or edema  LYMPH: No lymphadenopathy noted  SKIN: No rashes or lesions    Consultant(s) Notes Reviewed:  [x ] YES  [ ] NO  Care Discussed with Consultants/Other Providers [ x] YES  [ ] NO    LABS:                        8.4    10.48 )-----------( 187      ( 14 Feb 2022 13:12 )             26.6     02-14    139  |  110<H>  |  31<H>  ----------------------------<  73  4.5   |  14<L>  |  1.79<H>    Ca    9.3      14 Feb 2022 07:46      PT/INR - ( 14 Feb 2022 13:12 )   PT: 13.8 sec;   INR: 1.16 ratio         PTT - ( 12 Feb 2022 22:04 )  PTT:29.0 sec    CAPILLARY BLOOD GLUCOSE                  RADIOLOGY & ADDITIONAL TESTS:    Imaging Personally Reviewed:  [x ] YES  [ ] NO
Patient is a 51y old  Female who presents with a chief complaint of abnormal lab result (10 Feb 2022 16:57)      INTERVAL HPI/OVERNIGHT EVENTS: noted  pt seen and examined this am   events noted      Vital Signs Last 24 Hrs  T(C): 36.7 (11 Feb 2022 12:38), Max: 36.7 (10 Feb 2022 14:12)  T(F): 98.1 (11 Feb 2022 12:38), Max: 98.1 (10 Feb 2022 20:35)  HR: 92 (11 Feb 2022 12:38) (75 - 106)  BP: 104/71 (11 Feb 2022 12:38) (104/71 - 134/94)  BP(mean): --  RR: 18 (11 Feb 2022 12:38) (18 - 18)  SpO2: 98% (11 Feb 2022 12:38) (95% - 100%)    acetaminophen     Tablet .. 650 milliGRAM(s) Oral every 8 hours  cholecalciferol 5000 Unit(s) Oral daily  heparin   Injectable 5000 Unit(s) SubCutaneous every 8 hours  hydroxychloroquine 200 milliGRAM(s) Oral daily  mycophenolate mofetil 500 milliGRAM(s) Oral two times a day  oxyCODONE    IR 5 milliGRAM(s) Oral every 6 hours PRN  predniSONE   Tablet 10 milliGRAM(s) Oral daily      PHYSICAL EXAM:  GENERAL: NAD,   EYES: conjunctiva and sclera clear  ENMT: Moist mucous membranes  NECK: Supple, No JVD, Normal thyroid  CHEST/LUNG: non labored, cta b/l  HEART: Regular rate and rhythm; No murmurs, rubs, or gallops  ABDOMEN: Soft, Nontender, Nondistended; Bowel sounds present  EXTREMITIES:  2+ Peripheral Pulses, No clubbing, cyanosis, or edema  LYMPH: No lymphadenopathy noted  SKIN: No rashes or lesions    Consultant(s) Notes Reviewed:  [x ] YES  [ ] NO  Care Discussed with Consultants/Other Providers [ x] YES  [ ] NO    LABS:                        7.5    10.65 )-----------( 202      ( 11 Feb 2022 07:45 )             24.1     02-11    135  |  110<H>  |  35<H>  ----------------------------<  94  4.8   |  14<L>  |  1.89<H>    Ca    9.4      11 Feb 2022 07:45  Phos  3.0     02-10  Mg     1.6     02-10    TPro  6.6  /  Alb  3.6  /  TBili  0.2  /  DBili  x   /  AST  17  /  ALT  16  /  AlkPhos  119  02-10    PT/INR - ( 11 Feb 2022 07:45 )   PT: 15.0 sec;   INR: 1.26 ratio         PTT - ( 11 Feb 2022 07:45 )  PTT:32.8 sec    CAPILLARY BLOOD GLUCOSE                  RADIOLOGY & ADDITIONAL TESTS:    Imaging Personally Reviewed:  [x ] YES  [ ] NO
SUBJECTIVE / OVERNIGHT EVENTS:    no events overnight  patient seen and examined  c/o left shoulder pain    --------------------------------------------------------------------------------------------  LABS:                        8.4    10.48 )-----------( 187      ( 14 Feb 2022 13:12 )             26.6     02-14    139  |  110<H>  |  31<H>  ----------------------------<  73  4.5   |  14<L>  |  1.79<H>    Ca    9.3      14 Feb 2022 07:46      PT/INR - ( 14 Feb 2022 13:12 )   PT: 13.8 sec;   INR: 1.16 ratio           CAPILLARY BLOOD GLUCOSE                RADIOLOGY & ADDITIONAL TESTS:    Imaging Personally Reviewed:  [x] YES  [ ] NO    Consultant(s) Notes Reviewed:  [x] YES  [ ] NO    MEDICATIONS  (STANDING):  cholecalciferol 5000 Unit(s) Oral daily  heparin   Injectable 5000 Unit(s) SubCutaneous every 8 hours  hydroxychloroquine 200 milliGRAM(s) Oral daily  mycophenolate mofetil 500 milliGRAM(s) Oral two times a day  predniSONE   Tablet 10 milliGRAM(s) Oral daily    MEDICATIONS  (PRN):  acetaminophen     Tablet .. 650 milliGRAM(s) Oral every 6 hours PRN Moderate Pain (4 - 6)  oxyCODONE    IR 5 milliGRAM(s) Oral every 6 hours PRN Severe Pain (7 - 10)      Care Discussed with Consultants/Other Providers [x] YES  [ ] NO    Vital Signs Last 24 Hrs  T(C): 36.7 (15 Feb 2022 08:38), Max: 37.1 (14 Feb 2022 14:01)  T(F): 98 (15 Feb 2022 08:38), Max: 98.8 (14 Feb 2022 14:01)  HR: 94 (15 Feb 2022 08:38) (75 - 94)  BP: 129/90 (15 Feb 2022 08:38) (109/77 - 140/92)  BP(mean): --  RR: 18 (15 Feb 2022 08:38) (18 - 18)  SpO2: 96% (15 Feb 2022 08:38) (93% - 98%)  I&O's Summary    14 Feb 2022 07:01  -  15 Feb 2022 07:00  --------------------------------------------------------  IN: 830 mL / OUT: 0 mL / NET: 830 mL    PHYSICAL EXAM:  GENERAL: NAD,   EYES: conjunctiva and sclera clear  ENMT: Moist mucous membranes  NECK: Supple, No JVD, Normal thyroid  CHEST/LUNG: non labored, cta b/l  HEART: Regular rate and rhythm; No murmurs, rubs, or gallops  ABDOMEN: Soft, Nontender, Nondistended; Bowel sounds present  EXTREMITIES:  2+ Peripheral Pulses, No clubbing, cyanosis, or edema  LYMPH: No lymphadenopathy noted  SKIN: No rashes or lesions        
Vascular Surgery Progress Note    SUBJECTIVE:  Reports pain is well controlled  Has no numbness/weakness in extremities  No chest pain or sob    OBJECTIVE:  Vital Signs Last 24 Hrs  T(C): 36.4 (11 Feb 2022 21:16), Max: 36.7 (11 Feb 2022 00:00)  T(F): 97.6 (11 Feb 2022 21:16), Max: 98.1 (11 Feb 2022 00:00)  HR: 67 (11 Feb 2022 21:16) (67 - 99)  BP: 125/81 (11 Feb 2022 21:16) (104/71 - 125/81)  BP(mean): --  RR: 18 (11 Feb 2022 21:16) (18 - 18)  SpO2: 95% (11 Feb 2022 21:16) (95% - 100%)    Physical Examination:  GEN: NAD, resting quietly  NEURO: AAOx3, CN II-XII grossly intact, no focal deficits  PULM: symmetric chest rise bilaterally, no increased WOB  EXTR: no LE erythema, moving all extremities  VASC: Left axilla mildly swollen with reduced range of motion on external rotation and extension at shoulder, upper extremity radial pulses palpable    I&O's Detail    10 Feb 2022 07:01  -  11 Feb 2022 07:00  --------------------------------------------------------  IN:    Oral Fluid: 640 mL  Total IN: 640 mL    OUT:  Total OUT: 0 mL    Total NET: 640 mL      11 Feb 2022 07:01  -  11 Feb 2022 21:41  --------------------------------------------------------  IN:    Oral Fluid: 700 mL  Total IN: 700 mL    OUT:  Total OUT: 0 mL    Total NET: 700 mL            LABS:                        7.6    13.55 )-----------( 202      ( 11 Feb 2022 16:43 )             24.1       02-11    135  |  110<H>  |  35<H>  ----------------------------<  94  4.8   |  14<L>  |  1.89<H>    Ca    9.4      11 Feb 2022 07:45  Phos  3.0     02-10  Mg     1.6     02-10    TPro  6.6  /  Alb  3.6  /  TBili  0.2  /  DBili  x   /  AST  17  /  ALT  16  /  AlkPhos  119  02-10        IMAGING:  []
Patient is a 51y old  Female who presents with a chief complaint of abnormal lab result (13 Feb 2022 19:13)      INTERVAL HPI/OVERNIGHT EVENTS: noted  pt seen and examined this am   events   lt shoulder pain back last night  thiaam better      Vital Signs Last 24 Hrs  T(C): 36.4 (13 Feb 2022 18:47), Max: 37.1 (13 Feb 2022 18:27)  T(F): 97.6 (13 Feb 2022 18:47), Max: 98.7 (13 Feb 2022 18:27)  HR: 87 (13 Feb 2022 18:47) (71 - 87)  BP: 135/98 (13 Feb 2022 18:47) (112/76 - 143/86)  BP(mean): --  RR: 18 (13 Feb 2022 18:47) (18 - 18)  SpO2: 97% (13 Feb 2022 18:47) (97% - 99%)    acetaminophen     Tablet .. 650 milliGRAM(s) Oral every 8 hours  cholecalciferol 5000 Unit(s) Oral daily  heparin   Injectable 5000 Unit(s) SubCutaneous every 8 hours  hydroxychloroquine 200 milliGRAM(s) Oral daily  mycophenolate mofetil 500 milliGRAM(s) Oral two times a day  oxyCODONE    IR 5 milliGRAM(s) Oral every 6 hours PRN  predniSONE   Tablet 10 milliGRAM(s) Oral daily  warfarin 5 milliGRAM(s) Oral once      PHYSICAL EXAM:  GENERAL: NAD,   EYES: conjunctiva and sclera clear  ENMT: Moist mucous membranes  NECK: Supple, No JVD, Normal thyroid  CHEST/LUNG: non labored, cta b/l  HEART: Regular rate and rhythm; No murmurs, rubs, or gallops  ABDOMEN: Soft, Nontender, Nondistended; Bowel sounds present  EXTREMITIES:  2+ Peripheral Pulses, No clubbing, cyanosis, or edema  LYMPH: No lymphadenopathy noted  SKIN: No rashes or lesions    Consultant(s) Notes Reviewed:  [x ] YES  [ ] NO  Care Discussed with Consultants/Other Providers [ x] YES  [ ] NO    LABS:                        7.4    9.48  )-----------( 187      ( 13 Feb 2022 10:37 )             24.1     02-13    135  |  108  |  31<H>  ----------------------------<  130<H>  4.3   |  16<L>  |  1.79<H>    Ca    9.3      13 Feb 2022 10:37      PT/INR - ( 13 Feb 2022 07:38 )   PT: 13.9 sec;   INR: 1.17 ratio         PTT - ( 12 Feb 2022 22:04 )  PTT:29.0 sec    CAPILLARY BLOOD GLUCOSE                  RADIOLOGY & ADDITIONAL TESTS:    Imaging Personally Reviewed:  [x ] YES  [ ] NO
Vascular Surgery Progress Note    SUBJECTIVE:  Some tightness in pain in shoulder, denies numbness/weakness in LUE  No fevers, chest pain, or shortness of breath    OBJECTIVE:  Vital Signs Last 24 Hrs  T(C): 36.4 (14 Feb 2022 04:35), Max: 37.1 (13 Feb 2022 18:27)  T(F): 97.5 (14 Feb 2022 04:35), Max: 98.7 (13 Feb 2022 18:27)  HR: 84 (14 Feb 2022 04:35) (71 - 93)  BP: 108/80 (14 Feb 2022 04:35) (106/74 - 135/98)  BP(mean): --  RR: 17 (14 Feb 2022 04:35) (17 - 18)  SpO2: 99% (14 Feb 2022 04:35) (97% - 99%)    Physical Examination:  GEN: NAD, resting quietly  NEURO: AAOx3, CN II-XII grossly intact, no focal deficits  PULM: symmetric chest rise bilaterally, no increased WOB  EXTR: no LE erythema, moving all extremities  VASC: Left axilla mildly swollen with reduced range of motion on external rotation and extension at shoulder, upper extremity radial pulses palpable    I&O's Detail    13 Feb 2022 07:01  -  14 Feb 2022 07:00  --------------------------------------------------------  IN:    Oral Fluid: 820 mL    PRBCs (Packed Red Blood Cells): 1 mL  Total IN: 821 mL    OUT:  Total OUT: 0 mL    Total NET: 821 mL            LABS:                        7.4    9.48  )-----------( 187      ( 13 Feb 2022 10:37 )             24.1       02-14    139  |  110<H>  |  31<H>  ----------------------------<  73  4.5   |  14<L>  |  1.79<H>    Ca    9.3      14 Feb 2022 07:46          IMAGING:  []
Patient is a 51y old  Female who presents with a chief complaint of abnormal lab result (11 Feb 2022 21:40)      INTERVAL HPI/OVERNIGHT EVENTS: noted  pt seen and examined this am   events noted  c/o heart burn and nausea last night- relieved with maalox  this am denies any cp/sob/n/v      Vital Signs Last 24 Hrs  T(C): 36.7 (12 Feb 2022 05:30), Max: 36.7 (11 Feb 2022 12:38)  T(F): 98.1 (12 Feb 2022 05:30), Max: 98.1 (11 Feb 2022 12:38)  HR: 91 (12 Feb 2022 05:30) (67 - 97)  BP: 101/61 (12 Feb 2022 05:30) (101/61 - 125/81)  BP(mean): --  RR: 18 (12 Feb 2022 05:30) (18 - 18)  SpO2: 98% (12 Feb 2022 05:30) (95% - 98%)    acetaminophen     Tablet .. 650 milliGRAM(s) Oral every 8 hours  cholecalciferol 5000 Unit(s) Oral daily  heparin   Injectable 5000 Unit(s) SubCutaneous every 8 hours  hydroxychloroquine 200 milliGRAM(s) Oral daily  mycophenolate mofetil 500 milliGRAM(s) Oral two times a day  oxyCODONE    IR 5 milliGRAM(s) Oral every 6 hours PRN  predniSONE   Tablet 10 milliGRAM(s) Oral daily      PHYSICAL EXAM:  GENERAL: NAD,   EYES: conjunctiva and sclera clear  ENMT: Moist mucous membranes  NECK: Supple, No JVD, Normal thyroid  CHEST/LUNG: non labored, cta b/l  HEART: Regular rate and rhythm; No murmurs, rubs, or gallops  ABDOMEN: Soft, Nontender, Nondistended; Bowel sounds present  EXTREMITIES:  2+ Peripheral Pulses, No clubbing, cyanosis, or edema  LYMPH: No lymphadenopathy noted  SKIN: No rashes or lesions    Consultant(s) Notes Reviewed:  [x ] YES  [ ] NO  Care Discussed with Consultants/Other Providers [ x] YES  [ ] NO    LABS:                        7.6    13.55 )-----------( 202      ( 11 Feb 2022 16:43 )             24.1     02-11    135  |  110<H>  |  35<H>  ----------------------------<  94  4.8   |  14<L>  |  1.89<H>    Ca    9.4      11 Feb 2022 07:45      PT/INR - ( 11 Feb 2022 07:45 )   PT: 15.0 sec;   INR: 1.26 ratio         PTT - ( 11 Feb 2022 07:45 )  PTT:32.8 sec    CAPILLARY BLOOD GLUCOSE                  RADIOLOGY & ADDITIONAL TESTS:    Imaging Personally Reviewed:  [x ] YES  [ ] NO

## 2022-02-15 NOTE — PROGRESS NOTE ADULT - PROVIDER SPECIALTY LIST ADULT
Internal Medicine
Vascular Surgery
Internal Medicine
Vascular Surgery

## 2022-02-15 NOTE — PROGRESS NOTE ADULT - REASON FOR ADMISSION
abnormal lab result

## 2022-02-15 NOTE — DIETITIAN INITIAL EVALUATION ADULT. - CONTINUE CURRENT NUTRITION CARE PLAN
Continue current diet regimen: DASH diet; dairy and lactose intolerance (per pt report) updated in chart. RD provided education to pt; monitor BM and PO intake, as well as nausea symptoms./yes Continue current diet regimen: DASH diet; dairy and lactose intolerance (per pt report) updated in chart. RD provided education to pt; monitor BM and PO intake, as well as nausea symptoms. Nutrition Alert for Morbid Obesity placed in medical record./yes

## 2022-02-15 NOTE — PROGRESS NOTE ADULT - ATTENDING COMMENTS
supratherapeutic INR w Lt axillary pain radiating to lt arm  CT UE-likely axillary hematoma  causing neurovascular symptoms  US UE soft tissue- limited-showed no hematoma  will resume coumadin and monitor INR  LT UE persistant pain- need vascular reeval- to r/o ?other pathology  ?need rpt CT UE  pain control tylenol and oxyIR  d/w NP    [facility]

## 2022-02-15 NOTE — DIETITIAN INITIAL EVALUATION ADULT. - OTHER INFO
GASTROINTESTINAL:  Last BM:   Bowel Regimen: Miralax, senna    NUTRITION STATUS:  - Supplementation: vitamin D   - Heart burn/nausea- resolved ?    WEIGHT HISTORY:     EDUCATION  - Coumadin -vitamin K   - Wt management (BMI>40)  - DASH diet GASTROINTESTINAL:  BM: Pt reports last BM was yesterday   Bowel Regimen: none  - Pt complains of nausea   - Pt denies vomiting, diarrhea, or constipation    NUTRITION STATUS:  - Pt denies difficulty chewing/ swallowing  - Pt repots consuming ~3/4 meals in house; % as per flowsheets   - Supplementation: vitamin D   - Noted pt with CKD, INDIA; Phos, +K WNL at this time, per lab review; continue to monitor and adjust diet as needed; on DASH low Na diet now    WEIGHT HISTORY:   -2/15 bedscale wt checked by RD upon visit, 292.5 pounds   - UBW reported by pt 280 pounds 2/8  - Noted dosing wt     EDUCATION  - Coumadin -vitamin K   Provided pt with coumadin written material. Discussed drug-nutrient interaction of vitamin K and coumadin, the importance of consuming a consistent amount of vitamin K throughout the day, foods low in vitamin K, foods high in vitamin K. Pt verbalized understanding of nutrition education.   - Wt management (BMI>40)-provided weight loss tips (discussed consistent meal patterns, portion control and balanced meals).   - Discussed DASH diet education. Reviewed foods high in Na and cholesterol to avoid. Reviewed ways to decrease Na in your diet, discussed meal and snack options, tips for eating out.  -RD remains available to education review/reinforcement.

## 2022-02-15 NOTE — PROGRESS NOTE ADULT - NUTRITIONAL ASSESSMENT
This patient has been assessed with a concern for Malnutrition and has been determined to have a diagnosis/diagnoses of Morbid obesity (BMI > 40).    This patient is being managed with:   Diet Regular-  DASH/TLC {Sodium & Cholesterol Restricted} (DASH)  Low Sodium  Entered: Feb 10 2022  8:44AM

## 2022-02-15 NOTE — DIETITIAN INITIAL EVALUATION ADULT. - ORAL INTAKE PTA/DIET HISTORY
Diet PTA:  Nutrition status PTA:  Nutrition Supplements PTA: Rx- steroids   Food Allergies:  Other Information: Diet PTA: Pt reports LACTOSE INTOLERANT and dairy free (states diarrhea, stomach upset, bloating)- updated in chart. Pt states her  cooks at home, tries to follow low Na diet but not 100% adherence. Pt confirms No known food allergies. Pt reports taking Coumadin PTA and was educated on vitamin K interactions, but appreciated reviewing with RD.  Pt reports good appetite and PO intake PTA  Nutrition Supplements PTA: Rx- steroids

## 2022-02-15 NOTE — PROGRESS NOTE ADULT - ASSESSMENT
51 year old female with PMHx of DVT on AC, Gout, HTN, Iron Deficiency Anemia, Obesity, SLE on cellcept, plaquenil and prednisone. Presents to [facility] by Rheumatologist for elevated INR.    #Supratherapeutic INR - s/ vit k  INR down to 1.2- subtherapeutic now    #Left Shoulder Pain: ?hematoma axillary area -involvement of neurovascular structures  CT Shoulder with extensive dense surrounding subcutaneous inflammatory fat stranding versus contusion  which extends into the medial aspect of the upper arm and along the subclavian vessels. No soft tissue gas. No abscess.  INR now subtherapeutic,   Hb stable post transfusion  resume coumadin and closely monitor Hb  Pain mgmt  Vascular cs- no interventions  US lt UE-to eval hematoma- none seen    #Iron Deficiency Anemia:,  w blood loss anemia dt hematoma  Serial CBC's  Transfuse PRN fro symptoms or hb <7.5  Cont to monitor - sp1u prbc 2/13    #Heart burn/nausea- resolved  started PPI pt on chronic steroids  check EKG reviewed- no ischemic  changes    #SLE -resume home meds  on cellcept, plaquenil and prednisone.  rheum cs  fu    #INDIA on CKD baseline cr 1.6- lupus nephritis  Monitor I/O's  Serial Cr, improving  Avoid nephrotoxins  Renally dose medications  Continue to monitor    #HTN:  BP stable  Cont home CV meds  Holding Losartan 2/2 INDIA    #Hx of DVT dx 6/21  On Coumadin- will hold dt hematoma and downtrending Hb  checK LE doppler neg DVT  Cont to monitor INR- subtherapeutic  start hep sq for dvt ppx      [facility]  [phone]

## 2022-02-15 NOTE — DIETITIAN INITIAL EVALUATION ADULT. - REASON INDICATOR FOR ASSESSMENT
Pt seen for meeting BMI >40 kg/m2 criteria   Information obtained from pt interview and medical record

## 2022-02-15 NOTE — PHYSICAL THERAPY INITIAL EVALUATION ADULT - PERTINENT HX OF CURRENT PROBLEM, REHAB EVAL
Pt is 52 y/o female admitted to [facility] on 2/10/22 PMHx of DVT, Gout, HTN, Iron Deficiency Anemia, Obesity and SLE. Presents to [facility] by Rheumatologist for elevated INR.  Pt states she went to see her Rheumatologist on Wednesday for a routine check up and was called later that evening with a result of an abnormal INR and was instructed to stop taking her Coumadin until Friday and then will repeat blood work.

## 2022-02-16 ENCOUNTER — TRANSCRIPTION ENCOUNTER (OUTPATIENT)
Age: 52
End: 2022-02-16

## 2022-02-16 VITALS
OXYGEN SATURATION: 99 % | HEART RATE: 84 BPM | DIASTOLIC BLOOD PRESSURE: 74 MMHG | RESPIRATION RATE: 18 BRPM | TEMPERATURE: 98 F | SYSTOLIC BLOOD PRESSURE: 107 MMHG

## 2022-02-16 LAB
ANION GAP SERPL CALC-SCNC: 13 MMOL/L — SIGNIFICANT CHANGE UP (ref 5–17)
BUN SERPL-MCNC: 28 MG/DL — HIGH (ref 7–23)
CALCIUM SERPL-MCNC: 9.1 MG/DL — SIGNIFICANT CHANGE UP (ref 8.4–10.5)
CHLORIDE SERPL-SCNC: 109 MMOL/L — HIGH (ref 96–108)
CO2 SERPL-SCNC: 17 MMOL/L — LOW (ref 22–31)
CREAT SERPL-MCNC: 1.73 MG/DL — HIGH (ref 0.5–1.3)
GLUCOSE SERPL-MCNC: 103 MG/DL — HIGH (ref 70–99)
HCT VFR BLD CALC: 26 % — LOW (ref 34.5–45)
HGB BLD-MCNC: 8.1 G/DL — LOW (ref 11.5–15.5)
INR BLD: 1.36 RATIO — HIGH (ref 0.88–1.16)
MCHC RBC-ENTMCNC: 24.5 PG — LOW (ref 27–34)
MCHC RBC-ENTMCNC: 31.2 GM/DL — LOW (ref 32–36)
MCV RBC AUTO: 78.8 FL — LOW (ref 80–100)
NRBC # BLD: 4 /100 WBCS — HIGH (ref 0–0)
PLATELET # BLD AUTO: 165 K/UL — SIGNIFICANT CHANGE UP (ref 150–400)
POTASSIUM SERPL-MCNC: 4 MMOL/L — SIGNIFICANT CHANGE UP (ref 3.5–5.3)
POTASSIUM SERPL-SCNC: 4 MMOL/L — SIGNIFICANT CHANGE UP (ref 3.5–5.3)
PROTHROM AB SERPL-ACNC: 16.1 SEC — HIGH (ref 10.6–13.6)
RBC # BLD: 3.3 M/UL — LOW (ref 3.8–5.2)
RBC # FLD: 21.7 % — HIGH (ref 10.3–14.5)
SODIUM SERPL-SCNC: 139 MMOL/L — SIGNIFICANT CHANGE UP (ref 135–145)
WBC # BLD: 9.83 K/UL — SIGNIFICANT CHANGE UP (ref 3.8–10.5)
WBC # FLD AUTO: 9.83 K/UL — SIGNIFICANT CHANGE UP (ref 3.8–10.5)

## 2022-02-16 PROCEDURE — 99285 EMERGENCY DEPT VISIT HI MDM: CPT | Mod: 25

## 2022-02-16 PROCEDURE — 80053 COMPREHEN METABOLIC PANEL: CPT

## 2022-02-16 PROCEDURE — U0005: CPT

## 2022-02-16 PROCEDURE — 83735 ASSAY OF MAGNESIUM: CPT

## 2022-02-16 PROCEDURE — 85610 PROTHROMBIN TIME: CPT

## 2022-02-16 PROCEDURE — 76882 US LMTD JT/FCL EVL NVASC XTR: CPT

## 2022-02-16 PROCEDURE — 80048 BASIC METABOLIC PNL TOTAL CA: CPT

## 2022-02-16 PROCEDURE — 96374 THER/PROPH/DIAG INJ IV PUSH: CPT

## 2022-02-16 PROCEDURE — 86146 BETA-2 GLYCOPROTEIN ANTIBODY: CPT

## 2022-02-16 PROCEDURE — 86900 BLOOD TYPING SEROLOGIC ABO: CPT

## 2022-02-16 PROCEDURE — 84100 ASSAY OF PHOSPHORUS: CPT

## 2022-02-16 PROCEDURE — 93970 EXTREMITY STUDY: CPT

## 2022-02-16 PROCEDURE — 85730 THROMBOPLASTIN TIME PARTIAL: CPT

## 2022-02-16 PROCEDURE — 85025 COMPLETE CBC W/AUTO DIFF WBC: CPT

## 2022-02-16 PROCEDURE — 86850 RBC ANTIBODY SCREEN: CPT

## 2022-02-16 PROCEDURE — 85613 RUSSELL VIPER VENOM DILUTED: CPT

## 2022-02-16 PROCEDURE — 86923 COMPATIBILITY TEST ELECTRIC: CPT

## 2022-02-16 PROCEDURE — 85027 COMPLETE CBC AUTOMATED: CPT

## 2022-02-16 PROCEDURE — 73200 CT UPPER EXTREMITY W/O DYE: CPT | Mod: MA

## 2022-02-16 PROCEDURE — 86147 CARDIOLIPIN ANTIBODY EA IG: CPT

## 2022-02-16 PROCEDURE — 93005 ELECTROCARDIOGRAM TRACING: CPT

## 2022-02-16 PROCEDURE — 36415 COLL VENOUS BLD VENIPUNCTURE: CPT

## 2022-02-16 PROCEDURE — P9040: CPT

## 2022-02-16 PROCEDURE — 83010 ASSAY OF HAPTOGLOBIN QUANT: CPT

## 2022-02-16 PROCEDURE — 83615 LACTATE (LD) (LDH) ENZYME: CPT

## 2022-02-16 PROCEDURE — 76377 3D RENDER W/INTRP POSTPROCES: CPT

## 2022-02-16 PROCEDURE — 97161 PT EVAL LOW COMPLEX 20 MIN: CPT

## 2022-02-16 PROCEDURE — 86901 BLOOD TYPING SEROLOGIC RH(D): CPT

## 2022-02-16 PROCEDURE — 36430 TRANSFUSION BLD/BLD COMPNT: CPT

## 2022-02-16 PROCEDURE — U0003: CPT

## 2022-02-16 RX ORDER — WARFARIN SODIUM 2.5 MG/1
1 TABLET ORAL
Qty: 0 | Refills: 0 | DISCHARGE

## 2022-02-16 RX ORDER — WARFARIN SODIUM 2.5 MG/1
5 TABLET ORAL ONCE
Refills: 0 | Status: COMPLETED | OUTPATIENT
Start: 2022-02-16 | End: 2022-02-16

## 2022-02-16 RX ORDER — WARFARIN SODIUM 2.5 MG/1
1 TABLET ORAL
Qty: 30 | Refills: 0
Start: 2022-02-16 | End: 2022-03-17

## 2022-02-16 RX ADMIN — HEPARIN SODIUM 5000 UNIT(S): 5000 INJECTION INTRAVENOUS; SUBCUTANEOUS at 05:55

## 2022-02-16 RX ADMIN — HEPARIN SODIUM 5000 UNIT(S): 5000 INJECTION INTRAVENOUS; SUBCUTANEOUS at 13:19

## 2022-02-16 RX ADMIN — WARFARIN SODIUM 5 MILLIGRAM(S): 2.5 TABLET ORAL at 12:28

## 2022-02-16 RX ADMIN — Medication 5000 UNIT(S): at 11:42

## 2022-02-16 RX ADMIN — Medication 200 MILLIGRAM(S): at 11:42

## 2022-02-16 RX ADMIN — Medication 10 MILLIGRAM(S): at 05:56

## 2022-02-16 RX ADMIN — MYCOPHENOLATE MOFETIL 500 MILLIGRAM(S): 250 CAPSULE ORAL at 05:55

## 2022-02-16 NOTE — DISCHARGE NOTE PROVIDER - CARE PROVIDERS DIRECT ADDRESSES
[email or web address]

## 2022-02-16 NOTE — DISCHARGE NOTE PROVIDER - NSDCMRMEDTOKEN_GEN_ALL_CORE_FT
Bystolic 10 mg oral tablet: 1 tab(s) orally once a day am  CellCept 500 mg oral tablet: 2 tab(s) orally in am   3 tabs orally in the evening  febuxostat 40 mg oral tablet: 1 tab(s) orally once a day am  hydroxychloroquine 200 mg oral tablet: 1 tablet orally monday-Friday  2 tablets orally on Saturday and Sunday  irbesartan 300 mg oral tablet: 1 tab(s) orally once a day am  predniSONE 10 mg oral tablet: 1 tab(s) orally once a day  Vitamin D3 125 mcg (5000 intl units) oral capsule: 1 cap(s) orally once a day  warfarin 5 mg oral tablet: 1 tab(s) orally once a day

## 2022-02-16 NOTE — DISCHARGE NOTE NURSING/CASE MANAGEMENT/SOCIAL WORK - NSDCPEFALRISK_GEN_ALL_CORE
For information on Fall & Injury Prevention, visit: [email or web address] OR  [email or web address] OR  https://www.cdc.gov/steadi/patient.html

## 2022-02-16 NOTE — DISCHARGE NOTE PROVIDER - CARE PROVIDER_API CALL
Eb Guerra)  Internal Medicine; Rheumatology  [address] [facility] [facility], [facility] [address]  [place] [address]  Phone: [phone]  Fax: [phone]  Established Patient  Follow Up Time: 1-3 days    Kulwant Greco  INTERNAL MEDICINE  [address] [place] [id number]  Phone: [phone]  Fax: [phone]  Established Patient  Follow Up Time: 2 weeks

## 2022-02-16 NOTE — DISCHARGE NOTE NURSING/CASE MANAGEMENT/SOCIAL WORK - NSDCVIVACCINE_GEN_ALL_CORE_FT
influenza, injectable, quadrivalent, preservative free; 16-Oct-2017 14:45; Myla York (RN); [employer]; [id number]; IntraMuscular; Deltoid Left.; 0.5 milliLiter(s); VIS (VIS Published: 07-Aug-2015, VIS Presented: 16-Oct-2017);

## 2022-02-16 NOTE — DISCHARGE NOTE PROVIDER - NSDCCPCAREPLAN_GEN_ALL_CORE_FT
PRINCIPAL DISCHARGE DIAGNOSIS  Diagnosis: Elevated INR  Assessment and Plan of Treatment: You had an elevated INR to 11 and were given vitamin K to help bring down the level. As of 2/16 your INR is 1.36. Continue Coumadin 5 mg by mouth daily until you have your INR rechecked this Friday      SECONDARY DISCHARGE DIAGNOSES  Diagnosis: Left shoulder pain  Assessment and Plan of Treatment: You developed a hematoma (collection of blood under the skin). Continue warm compresses as directed.

## 2022-02-16 NOTE — DISCHARGE NOTE PROVIDER - PROVIDER TOKENS
PROVIDER:[TOKEN:[[id number]],FOLLOWUP:[1-3 days],ESTABLISHEDPATIENT:[T]],PROVIDER:[TOKEN:[[id number]],FOLLOWUP:[2 weeks],ESTABLISHEDPATIENT:[T]]

## 2022-02-16 NOTE — DISCHARGE NOTE PROVIDER - DETAILS OF MALNUTRITION DIAGNOSIS/DIAGNOSES
This patient has been assessed with a concern for Malnutrition and was treated during this hospitalization for the following Nutrition diagnosis/diagnoses:     -  02/15/2022: Morbid obesity (BMI > 40)

## 2022-02-16 NOTE — CHART NOTE - NSCHARTNOTEFT_GEN_A_CORE
No hematoma appreciated currently.  Pt stable  No contraindication to dc from vascular standpoint    Discussed with vascular fellow    Vascular Surgery  [id number]

## 2022-02-16 NOTE — DISCHARGE NOTE PROVIDER - NSDCFUADDINST_GEN_ALL_CORE_FT
Follow up this Friday 2/18 for a recheck of your INR. Take only 5 mg of Coumadin daily until your doctor adjusts the dose.

## 2022-02-16 NOTE — DISCHARGE NOTE PROVIDER - HOSPITAL COURSE
51 year old female with PMHx of DVT on AC, Gout, HTN, Iron Deficiency Anemia, Obesity, SLE on cellcept, plaquenil and prednisone. Presents to [facility] by Rheumatologist for elevated INR.    #Supratherapeutic INR - s/ vit k  INR down to 1.2- subtherapeutic now    #Left Shoulder Pain: ?hematoma axillary area -involvement of neurovascular structures  CT Shoulder with extensive dense surrounding subcutaneous inflammatory fat stranding versus contusion  which extends into the medial aspect of the upper arm and along the subclavian vessels. No soft tissue gas. No abscess.  INR now subtherapeutic,   Hb stable post transfusion  resume coumadin and closely monitor Hb  Pain mgmt  Vascular cs- no interventions  US lt UE-to eval hematoma- none seen    #Iron Deficiency Anemia:,  w blood loss anemia dt hematoma  Serial CBC's  Transfuse PRN fro symptoms or hb <7.5  Cont to monitor - sp1u prbc 2/13    #Heart burn/nausea- resolved  started PPI pt on chronic steroids  check EKG reviewed- no ischemic  changes    #SLE -resume home meds  on cellcept, plaquenil and prednisone.  rheum cs  fu    #INDIA on CKD baseline cr 1.6- lupus nephritis  Monitor I/O's  Serial Cr, improving  Avoid nephrotoxins  Renally dose medications  Continue to monitor    #HTN:  BP stable  Cont home CV meds  Holding Losartan 2/2 INDIA    #Hx of DVT dx 6/21  On Coumadin- will hold dt hematoma and downtrending Hb  checK LE doppler neg DVT  Cont to monitor INR- subtherapeutic

## 2022-02-16 NOTE — DISCHARGE NOTE NURSING/CASE MANAGEMENT/SOCIAL WORK - PATIENT PORTAL LINK FT
You can access the FollowMyHealth Patient Portal offered by [facility] by registering at the following website: [email or web address]. By joining [employer]’s FollowMyHealth portal, you will also be able to view your health information using other applications (apps) compatible with our system.

## 2022-02-19 LAB
INR PPP: 1.49 RATIO
PT BLD: 17.2 SEC

## 2022-02-24 LAB
INR PPP: 2 RATIO
PT BLD: 23.6 SEC

## 2022-02-27 ENCOUNTER — NON-APPOINTMENT (OUTPATIENT)
Age: 52
End: 2022-02-27

## 2022-03-05 LAB
INR PPP: 2.82 RATIO
PT BLD: 33.1 SEC

## 2022-03-18 LAB
INR PPP: 2.17 RATIO
PT BLD: 25.8 SEC

## 2022-03-29 ENCOUNTER — APPOINTMENT (OUTPATIENT)
Dept: MAMMOGRAPHY | Facility: IMAGING CENTER | Age: 52
End: 2022-03-29
Payer: COMMERCIAL

## 2022-03-29 ENCOUNTER — OUTPATIENT (OUTPATIENT)
Dept: OUTPATIENT SERVICES | Facility: HOSPITAL | Age: 52
LOS: 1 days | End: 2022-03-29
Payer: COMMERCIAL

## 2022-03-29 ENCOUNTER — APPOINTMENT (OUTPATIENT)
Dept: ULTRASOUND IMAGING | Facility: IMAGING CENTER | Age: 52
End: 2022-03-29
Payer: COMMERCIAL

## 2022-03-29 DIAGNOSIS — D36.9 BENIGN NEOPLASM, UNSPECIFIED SITE: Chronic | ICD-10-CM

## 2022-03-29 DIAGNOSIS — Z00.8 ENCOUNTER FOR OTHER GENERAL EXAMINATION: ICD-10-CM

## 2022-03-29 PROCEDURE — 76641 ULTRASOUND BREAST COMPLETE: CPT | Mod: 26,50

## 2022-03-29 PROCEDURE — 77067 SCR MAMMO BI INCL CAD: CPT

## 2022-03-29 PROCEDURE — 77063 BREAST TOMOSYNTHESIS BI: CPT | Mod: 26

## 2022-03-29 PROCEDURE — 77067 SCR MAMMO BI INCL CAD: CPT | Mod: 26

## 2022-03-29 PROCEDURE — 77063 BREAST TOMOSYNTHESIS BI: CPT

## 2022-03-29 PROCEDURE — 76641 ULTRASOUND BREAST COMPLETE: CPT

## 2022-03-31 ENCOUNTER — NON-APPOINTMENT (OUTPATIENT)
Age: 52
End: 2022-03-31

## 2022-04-07 LAB
INR PPP: 1.65 RATIO
PT BLD: 19.4 SEC

## 2022-04-11 ENCOUNTER — APPOINTMENT (OUTPATIENT)
Dept: RHEUMATOLOGY | Facility: CLINIC | Age: 52
End: 2022-04-11

## 2022-04-12 LAB
INR PPP: 1.65 RATIO
PT BLD: 19.4 SEC

## 2022-04-13 ENCOUNTER — APPOINTMENT (OUTPATIENT)
Dept: ULTRASOUND IMAGING | Facility: IMAGING CENTER | Age: 52
End: 2022-04-13
Payer: COMMERCIAL

## 2022-04-13 ENCOUNTER — OUTPATIENT (OUTPATIENT)
Dept: OUTPATIENT SERVICES | Facility: HOSPITAL | Age: 52
LOS: 1 days | End: 2022-04-13
Payer: COMMERCIAL

## 2022-04-13 DIAGNOSIS — D36.9 BENIGN NEOPLASM, UNSPECIFIED SITE: Chronic | ICD-10-CM

## 2022-04-13 DIAGNOSIS — Z00.8 ENCOUNTER FOR OTHER GENERAL EXAMINATION: ICD-10-CM

## 2022-04-13 PROCEDURE — 76642 ULTRASOUND BREAST LIMITED: CPT

## 2022-04-13 PROCEDURE — 76642 ULTRASOUND BREAST LIMITED: CPT | Mod: 26,LT

## 2022-04-18 ENCOUNTER — APPOINTMENT (OUTPATIENT)
Dept: RHEUMATOLOGY | Facility: CLINIC | Age: 52
End: 2022-04-18
Payer: COMMERCIAL

## 2022-04-18 ENCOUNTER — LABORATORY RESULT (OUTPATIENT)
Age: 52
End: 2022-04-18

## 2022-04-18 VITALS
WEIGHT: 289.8 LBS | BODY MASS INDEX: 43.92 KG/M2 | OXYGEN SATURATION: 96 % | HEIGHT: 68 IN | TEMPERATURE: 97.2 F | DIASTOLIC BLOOD PRESSURE: 77 MMHG | SYSTOLIC BLOOD PRESSURE: 124 MMHG | HEART RATE: 85 BPM

## 2022-04-18 DIAGNOSIS — R60.9 EDEMA, UNSPECIFIED: ICD-10-CM

## 2022-04-18 DIAGNOSIS — Z79.52 LONG TERM (CURRENT) USE OF SYSTEMIC STEROIDS: ICD-10-CM

## 2022-04-18 LAB
INR PPP: 1.94 RATIO
PT BLD: 22.8 SEC
RBC # BLD: 4.19 M/UL
RETICS # AUTO: 2.8 %
RETICS AGGREG/RBC NFR: 115.6 K/UL

## 2022-04-18 PROCEDURE — 99214 OFFICE O/P EST MOD 30 MIN: CPT

## 2022-04-19 LAB
25(OH)D3 SERPL-MCNC: 23.8 NG/ML
ALBUMIN SERPL ELPH-MCNC: 3.9 G/DL
ALP BLD-CCNC: 102 U/L
ALT SERPL-CCNC: 12 U/L
ANION GAP SERPL CALC-SCNC: 12 MMOL/L
APPEARANCE: CLEAR
AST SERPL-CCNC: 12 U/L
BACTERIA: NEGATIVE
BASOPHILS # BLD AUTO: 0.05 K/UL
BASOPHILS NFR BLD AUTO: 0.2 %
BILIRUB SERPL-MCNC: 0.2 MG/DL
BILIRUBIN URINE: NEGATIVE
BLOOD URINE: NEGATIVE
BUN SERPL-MCNC: 27 MG/DL
C3 SERPL-MCNC: 108 MG/DL
C4 SERPL-MCNC: 12 MG/DL
CALCIUM SERPL-MCNC: 9 MG/DL
CHLORIDE SERPL-SCNC: 110 MMOL/L
CK SERPL-CCNC: 57 U/L
CO2 SERPL-SCNC: 17 MMOL/L
COLOR: NORMAL
CREAT SERPL-MCNC: 1.65 MG/DL
CREAT SPEC-SCNC: 140 MG/DL
CREAT/PROT UR: 0.2 RATIO
DIRECT COOMBS: NORMAL
DSDNA AB SER-ACNC: 32 IU/ML
EGFR: 38 ML/MIN/1.73M2
EOSINOPHIL # BLD AUTO: 0.04 K/UL
EOSINOPHIL NFR BLD AUTO: 0.2 %
GLUCOSE QUALITATIVE U: NEGATIVE
HAPTOGLOB SERPL-MCNC: 145 MG/DL
HCT VFR BLD CALC: 33 %
HGB BLD-MCNC: 10.1 G/DL
HYALINE CASTS: 3 /LPF
IMM GRANULOCYTES NFR BLD AUTO: 0.9 %
IRON SATN MFR SERPL: 11 %
IRON SERPL-MCNC: 41 UG/DL
KETONES URINE: NEGATIVE
LEUKOCYTE ESTERASE URINE: NEGATIVE
LYMPHOCYTES # BLD AUTO: 1.44 K/UL
LYMPHOCYTES NFR BLD AUTO: 6.6 %
MAN DIFF?: NORMAL
MCHC RBC-ENTMCNC: 24.1 PG
MCHC RBC-ENTMCNC: 30.6 GM/DL
MCV RBC AUTO: 78.8 FL
MICROSCOPIC-UA: NORMAL
MONOCYTES # BLD AUTO: 1.49 K/UL
MONOCYTES NFR BLD AUTO: 6.9 %
NEUTROPHILS # BLD AUTO: 18.48 K/UL
NEUTROPHILS NFR BLD AUTO: 85.2 %
NITRITE URINE: NEGATIVE
PH URINE: 6
PLATELET # BLD AUTO: 225 K/UL
POTASSIUM SERPL-SCNC: 4.4 MMOL/L
PROT SERPL-MCNC: 7.1 G/DL
PROT UR-MCNC: 22 MG/DL
PROTEIN URINE: ABNORMAL
RBC # BLD: 4.19 M/UL
RBC # FLD: 20.4 %
RED BLOOD CELLS URINE: 1 /HPF
SODIUM SERPL-SCNC: 139 MMOL/L
SPECIFIC GRAVITY URINE: 1.02
SQUAMOUS EPITHELIAL CELLS: 8 /HPF
TIBC SERPL-MCNC: 367 UG/DL
UIBC SERPL-MCNC: 325 UG/DL
UROBILINOGEN URINE: NORMAL
WBC # FLD AUTO: 21.69 K/UL
WHITE BLOOD CELLS URINE: 5 /HPF

## 2022-06-13 ENCOUNTER — LABORATORY RESULT (OUTPATIENT)
Age: 52
End: 2022-06-13

## 2022-06-13 ENCOUNTER — APPOINTMENT (OUTPATIENT)
Dept: RHEUMATOLOGY | Facility: CLINIC | Age: 52
End: 2022-06-13
Payer: COMMERCIAL

## 2022-06-13 VITALS
SYSTOLIC BLOOD PRESSURE: 93 MMHG | HEIGHT: 69 IN | DIASTOLIC BLOOD PRESSURE: 70 MMHG | HEART RATE: 73 BPM | WEIGHT: 139 LBS | TEMPERATURE: 96.8 F | BODY MASS INDEX: 20.59 KG/M2 | OXYGEN SATURATION: 98 %

## 2022-06-13 VITALS — SYSTOLIC BLOOD PRESSURE: 112 MMHG | RESPIRATION RATE: 16 BRPM | DIASTOLIC BLOOD PRESSURE: 76 MMHG | HEART RATE: 65 BPM

## 2022-06-13 LAB
25(OH)D3 SERPL-MCNC: 22 NG/ML
ALBUMIN SERPL ELPH-MCNC: 4 G/DL
ALP BLD-CCNC: 81 U/L
ALT SERPL-CCNC: 17 U/L
ANION GAP SERPL CALC-SCNC: 14 MMOL/L
APPEARANCE: ABNORMAL
AST SERPL-CCNC: 16 U/L
BACTERIA: NEGATIVE
BILIRUB SERPL-MCNC: 0.2 MG/DL
BILIRUBIN URINE: NEGATIVE
BLOOD URINE: NEGATIVE
BUN SERPL-MCNC: 34 MG/DL
CALCIUM SERPL-MCNC: 8.9 MG/DL
CHLORIDE SERPL-SCNC: 109 MMOL/L
CK SERPL-CCNC: 52 U/L
CO2 SERPL-SCNC: 14 MMOL/L
COLOR: NORMAL
CREAT SERPL-MCNC: 1.86 MG/DL
CREAT SPEC-SCNC: 134 MG/DL
CREAT/PROT UR: 0.1 RATIO
EGFR: 33 ML/MIN/1.73M2
GLUCOSE QUALITATIVE U: NEGATIVE
HYALINE CASTS: 3 /LPF
INR PPP: 2.25 RATIO
KETONES URINE: NEGATIVE
LEUKOCYTE ESTERASE URINE: ABNORMAL
MICROSCOPIC-UA: NORMAL
NITRITE URINE: NEGATIVE
PH URINE: 6
POTASSIUM SERPL-SCNC: 4.9 MMOL/L
PROT SERPL-MCNC: 6.9 G/DL
PROT UR-MCNC: 7 MG/DL
PROTEIN URINE: NORMAL
PT BLD: 26.6 SEC
RED BLOOD CELLS URINE: 1 /HPF
SODIUM SERPL-SCNC: 137 MMOL/L
SPECIFIC GRAVITY URINE: 1.02
SQUAMOUS EPITHELIAL CELLS: 8 /HPF
URATE SERPL-MCNC: 5.7 MG/DL
UROBILINOGEN URINE: NORMAL
WHITE BLOOD CELLS URINE: 6 /HPF

## 2022-06-13 PROCEDURE — 99214 OFFICE O/P EST MOD 30 MIN: CPT

## 2022-06-14 LAB
BASOPHILS # BLD AUTO: 0 K/UL
BASOPHILS NFR BLD AUTO: 0 %
C3 SERPL-MCNC: 88 MG/DL
C4 SERPL-MCNC: 13 MG/DL
DSDNA AB SER-ACNC: 27 IU/ML
EOSINOPHIL # BLD AUTO: 0 K/UL
EOSINOPHIL NFR BLD AUTO: 0 %
HCT VFR BLD CALC: 31.3 %
HGB BLD-MCNC: 9.7 G/DL
LYMPHOCYTES # BLD AUTO: 1.39 K/UL
LYMPHOCYTES NFR BLD AUTO: 9.6 %
MAN DIFF?: NORMAL
MCHC RBC-ENTMCNC: 23.7 PG
MCHC RBC-ENTMCNC: 31 GM/DL
MCV RBC AUTO: 76.5 FL
MONOCYTES # BLD AUTO: 0.77 K/UL
MONOCYTES NFR BLD AUTO: 5.3 %
NEUTROPHILS # BLD AUTO: 12.32 K/UL
NEUTROPHILS NFR BLD AUTO: 85.1 %
PLATELET # BLD AUTO: 234 K/UL
RBC # BLD: 4.09 M/UL
RBC # FLD: 20.7 %
WBC # FLD AUTO: 14.48 K/UL

## 2022-06-29 ENCOUNTER — RX RENEWAL (OUTPATIENT)
Age: 52
End: 2022-06-29

## 2022-08-01 ENCOUNTER — RX RENEWAL (OUTPATIENT)
Age: 52
End: 2022-08-01

## 2022-08-15 ENCOUNTER — APPOINTMENT (OUTPATIENT)
Dept: RHEUMATOLOGY | Facility: CLINIC | Age: 52
End: 2022-08-15

## 2022-08-15 ENCOUNTER — LABORATORY RESULT (OUTPATIENT)
Age: 52
End: 2022-08-15

## 2022-08-15 LAB
25(OH)D3 SERPL-MCNC: 23.3 NG/ML
ALBUMIN SERPL ELPH-MCNC: 3.5 G/DL
ALP BLD-CCNC: 94 U/L
ALT SERPL-CCNC: 10 U/L
ANION GAP SERPL CALC-SCNC: 14 MMOL/L
AST SERPL-CCNC: 12 U/L
BILIRUB SERPL-MCNC: 0.2 MG/DL
BUN SERPL-MCNC: 27 MG/DL
C3 SERPL-MCNC: 83 MG/DL
C4 SERPL-MCNC: 14 MG/DL
CALCIUM SERPL-MCNC: 8.9 MG/DL
CALCIUM SERPL-MCNC: 8.9 MG/DL
CHLORIDE SERPL-SCNC: 117 MMOL/L
CK SERPL-CCNC: 67 U/L
CO2 SERPL-SCNC: 14 MMOL/L
CREAT SERPL-MCNC: 1.85 MG/DL
CREAT SPEC-SCNC: 128 MG/DL
CREAT/PROT UR: 0.1 RATIO
EGFR: 33 ML/MIN/1.73M2
INR PPP: 1.31 RATIO
IRON SATN MFR SERPL: 11 %
IRON SERPL-MCNC: 32 UG/DL
MAGNESIUM SERPL-MCNC: 1.5 MG/DL
PARATHYROID HORMONE INTACT: 132 PG/ML
PHOSPHATE SERPL-MCNC: 3.7 MG/DL
POTASSIUM SERPL-SCNC: 4.7 MMOL/L
PROT SERPL-MCNC: 6.5 G/DL
PROT UR-MCNC: 9 MG/DL
PT BLD: 15.4 SEC
SODIUM SERPL-SCNC: 145 MMOL/L
TIBC SERPL-MCNC: 299 UG/DL
UIBC SERPL-MCNC: 267 UG/DL
URATE SERPL-MCNC: 5 MG/DL

## 2022-08-15 PROCEDURE — 99215 OFFICE O/P EST HI 40 MIN: CPT | Mod: 25

## 2022-08-15 PROCEDURE — 90750 HZV VACC RECOMBINANT IM: CPT

## 2022-08-15 PROCEDURE — 90471 IMMUNIZATION ADMIN: CPT

## 2022-08-15 RX ORDER — IRON POLYSACCHARIDE COMPLEX 150 MG
150 CAPSULE ORAL
Qty: 30 | Refills: 3 | Status: ACTIVE | COMMUNITY
Start: 2022-08-15 | End: 1900-01-01

## 2022-08-16 ENCOUNTER — RX RENEWAL (OUTPATIENT)
Age: 52
End: 2022-08-16

## 2022-08-16 LAB
APPEARANCE: CLEAR
BACTERIA: ABNORMAL
BASOPHILS # BLD AUTO: 0.05 K/UL
BASOPHILS NFR BLD AUTO: 0.4 %
BILIRUBIN URINE: NEGATIVE
BLOOD URINE: ABNORMAL
COLOR: NORMAL
EOSINOPHIL # BLD AUTO: 0.08 K/UL
EOSINOPHIL NFR BLD AUTO: 0.7 %
GLUCOSE QUALITATIVE U: NEGATIVE
HCT VFR BLD CALC: 28.2 %
HGB BLD-MCNC: 8.6 G/DL
HYALINE CASTS: 0 /LPF
IMM GRANULOCYTES NFR BLD AUTO: 0.5 %
KETONES URINE: NEGATIVE
LEUKOCYTE ESTERASE URINE: ABNORMAL
LYMPHOCYTES # BLD AUTO: 1.67 K/UL
LYMPHOCYTES NFR BLD AUTO: 14.7 %
MAN DIFF?: NORMAL
MCHC RBC-ENTMCNC: 24.3 PG
MCHC RBC-ENTMCNC: 30.5 GM/DL
MCV RBC AUTO: 79.7 FL
MICROSCOPIC-UA: NORMAL
MONOCYTES # BLD AUTO: 1.11 K/UL
MONOCYTES NFR BLD AUTO: 9.8 %
NEUTROPHILS # BLD AUTO: 8.36 K/UL
NEUTROPHILS NFR BLD AUTO: 73.9 %
NITRITE URINE: NEGATIVE
PH URINE: 6
PLATELET # BLD AUTO: 189 K/UL
PROTEIN URINE: NORMAL
RBC # BLD: 3.54 M/UL
RBC # FLD: 21.2 %
RED BLOOD CELLS URINE: 5 /HPF
SPECIFIC GRAVITY URINE: 1.02
SQUAMOUS EPITHELIAL CELLS: 10 /HPF
UROBILINOGEN URINE: NORMAL
WBC # FLD AUTO: 11.33 K/UL
WHITE BLOOD CELLS URINE: 6 /HPF

## 2022-08-17 LAB
DSDNA AB SER-ACNC: 30 IU/ML
ENA RNP AB SER IA-ACNC: 0.3 AL
ENA SM AB SER IA-ACNC: 0.8 AL
ENA SS-A AB SER IA-ACNC: 2 AL
ENA SS-B AB SER IA-ACNC: <0.2 AL

## 2022-08-30 NOTE — H&P ADULT - NSICDXFAMILYHX_GEN_ALL_CORE_FT
no rashes , no suspicious lesions , no areas of discoloration
FAMILY HISTORY:  Family history of heart disease    Sibling  Still living? Yes, Estimated age: Age Unknown  Family history of breast cancer, Age at diagnosis: Age Unknown

## 2022-09-08 NOTE — ED ADULT NURSE NOTE - MODE OF DISCHARGE
I have called and talked to this patient. He tells me that he is not taking HCTZ, but is taking the Spironolactone and Lasix. He says that Radha Sanchez at [facility] advised him to take both.
Requested Prescriptions     Pending Prescriptions Disp Refills    furosemide (LASIX) 20 mg tablet 30 Tablet 0     Sig: Take 1 Tablet by mouth daily. Patient called saying that his liver doctor, Dr. Wes Keller and Hayder PATEL advised that he increase his lasix to 40 mg and he is now out.
Ambulatory

## 2022-09-26 NOTE — ASU PREOP CHECKLIST - ASSESSMENT, HISTORY & PHYSICAL COMPLETED AND ON MEDICAL RECORD
Bleeding that does not stop/Swelling that gets worse/Pain not relieved by Medications/Fever greater than (need to indicate Fahrenheit or Celsius)/Wound/Surgical Site with redness, or foul smelling discharge or pus/Inability to tolerate liquids or foods
done

## 2022-10-03 ENCOUNTER — APPOINTMENT (OUTPATIENT)
Dept: RHEUMATOLOGY | Facility: CLINIC | Age: 52
End: 2022-10-03

## 2022-10-31 ENCOUNTER — APPOINTMENT (OUTPATIENT)
Dept: RHEUMATOLOGY | Facility: CLINIC | Age: 52
End: 2022-10-31

## 2022-10-31 VITALS
WEIGHT: 288 LBS | RESPIRATION RATE: 16 BRPM | TEMPERATURE: 97.7 F | HEART RATE: 67 BPM | BODY MASS INDEX: 42.65 KG/M2 | SYSTOLIC BLOOD PRESSURE: 116 MMHG | OXYGEN SATURATION: 97 % | DIASTOLIC BLOOD PRESSURE: 74 MMHG | HEIGHT: 69 IN

## 2022-10-31 DIAGNOSIS — D64.9 ANEMIA, UNSPECIFIED: ICD-10-CM

## 2022-10-31 LAB
INR PPP: 2.65 RATIO
PT BLD: 31.3 SEC

## 2022-10-31 PROCEDURE — 90471 IMMUNIZATION ADMIN: CPT

## 2022-10-31 PROCEDURE — 99214 OFFICE O/P EST MOD 30 MIN: CPT | Mod: 25

## 2022-10-31 PROCEDURE — 90750 HZV VACC RECOMBINANT IM: CPT

## 2022-11-01 LAB
25(OH)D3 SERPL-MCNC: 24.6 NG/ML
ALBUMIN SERPL ELPH-MCNC: 4.1 G/DL
ALP BLD-CCNC: 87 U/L
ALT SERPL-CCNC: 17 U/L
ANION GAP SERPL CALC-SCNC: 11 MMOL/L
APPEARANCE: ABNORMAL
AST SERPL-CCNC: 17 U/L
BACTERIA: NEGATIVE
BASOPHILS # BLD AUTO: 0.05 K/UL
BASOPHILS NFR BLD AUTO: 0.4 %
BILIRUB SERPL-MCNC: 0.2 MG/DL
BILIRUBIN URINE: NEGATIVE
BLOOD URINE: ABNORMAL
BUN SERPL-MCNC: 31 MG/DL
C3 SERPL-MCNC: 92 MG/DL
C4 SERPL-MCNC: 16 MG/DL
CALCIUM SERPL-MCNC: 9.5 MG/DL
CHLORIDE SERPL-SCNC: 114 MMOL/L
CHROMATIN AB SERPL-ACNC: 1.4 AL
CK SERPL-CCNC: 101 U/L
CO2 SERPL-SCNC: 17 MMOL/L
COLOR: YELLOW
CREAT SERPL-MCNC: 1.83 MG/DL
CREAT SPEC-SCNC: 132 MG/DL
CREAT/PROT UR: 0.1 RATIO
DSDNA AB SER-ACNC: 32 IU/ML
EGFR: 33 ML/MIN/1.73M2
EOSINOPHIL # BLD AUTO: 0.07 K/UL
EOSINOPHIL NFR BLD AUTO: 0.6 %
GLUCOSE QUALITATIVE U: NEGATIVE
HCT VFR BLD CALC: 27.9 %
HGB BLD-MCNC: 8.2 G/DL
HYALINE CASTS: 4 /LPF
IMM GRANULOCYTES NFR BLD AUTO: 0.3 %
IRON SATN MFR SERPL: 8 %
IRON SERPL-MCNC: 31 UG/DL
KETONES URINE: NEGATIVE
LEUKOCYTE ESTERASE URINE: ABNORMAL
LYMPHOCYTES # BLD AUTO: 0.97 K/UL
LYMPHOCYTES NFR BLD AUTO: 7.9 %
MAN DIFF?: NORMAL
MCHC RBC-ENTMCNC: 22.8 PG
MCHC RBC-ENTMCNC: 29.4 GM/DL
MCV RBC AUTO: 77.5 FL
MICROSCOPIC-UA: NORMAL
MONOCYTES # BLD AUTO: 0.53 K/UL
MONOCYTES NFR BLD AUTO: 4.3 %
NEUTROPHILS # BLD AUTO: 10.61 K/UL
NEUTROPHILS NFR BLD AUTO: 86.5 %
NITRITE URINE: NEGATIVE
PH URINE: 6
PLATELET # BLD AUTO: 212 K/UL
POTASSIUM SERPL-SCNC: 4.7 MMOL/L
PROT SERPL-MCNC: 6.9 G/DL
PROT UR-MCNC: 11 MG/DL
PROTEIN URINE: ABNORMAL
RBC # BLD: 3.6 M/UL
RBC # FLD: 20.9 %
RED BLOOD CELLS URINE: 35 /HPF
SODIUM SERPL-SCNC: 142 MMOL/L
SPECIFIC GRAVITY URINE: 1.01
SQUAMOUS EPITHELIAL CELLS: 20 /HPF
TIBC SERPL-MCNC: 372 UG/DL
UIBC SERPL-MCNC: 341 UG/DL
URATE SERPL-MCNC: 5.9 MG/DL
UROBILINOGEN URINE: NORMAL
WBC # FLD AUTO: 12.27 K/UL
WHITE BLOOD CELLS URINE: 16 /HPF

## 2022-11-08 RX ORDER — WARFARIN 10 MG/1
10 TABLET ORAL
Qty: 90 | Refills: 3 | Status: ACTIVE | COMMUNITY
Start: 2021-06-23 | End: 1900-01-01

## 2022-12-07 ENCOUNTER — NON-APPOINTMENT (OUTPATIENT)
Age: 52
End: 2022-12-07

## 2023-01-03 ENCOUNTER — FORM ENCOUNTER (OUTPATIENT)
Age: 53
End: 2023-01-03

## 2023-01-22 ENCOUNTER — OUTPATIENT (OUTPATIENT)
Dept: OUTPATIENT SERVICES | Facility: HOSPITAL | Age: 53
LOS: 1 days | End: 2023-01-22
Payer: COMMERCIAL

## 2023-01-22 ENCOUNTER — APPOINTMENT (OUTPATIENT)
Dept: MRI IMAGING | Facility: IMAGING CENTER | Age: 53
End: 2023-01-22
Payer: COMMERCIAL

## 2023-01-22 DIAGNOSIS — D36.9 BENIGN NEOPLASM, UNSPECIFIED SITE: Chronic | ICD-10-CM

## 2023-01-22 DIAGNOSIS — Z00.8 ENCOUNTER FOR OTHER GENERAL EXAMINATION: ICD-10-CM

## 2023-01-22 PROCEDURE — A9585: CPT

## 2023-01-22 PROCEDURE — 72197 MRI PELVIS W/O & W/DYE: CPT

## 2023-01-22 PROCEDURE — 72197 MRI PELVIS W/O & W/DYE: CPT | Mod: 26

## 2023-01-27 ENCOUNTER — FORM ENCOUNTER (OUTPATIENT)
Age: 53
End: 2023-01-27

## 2023-02-06 ENCOUNTER — APPOINTMENT (OUTPATIENT)
Dept: RHEUMATOLOGY | Facility: CLINIC | Age: 53
End: 2023-02-06
Payer: COMMERCIAL

## 2023-02-06 ENCOUNTER — LABORATORY RESULT (OUTPATIENT)
Age: 53
End: 2023-02-06

## 2023-02-06 VITALS
OXYGEN SATURATION: 98 % | DIASTOLIC BLOOD PRESSURE: 86 MMHG | HEIGHT: 69 IN | HEART RATE: 77 BPM | WEIGHT: 283 LBS | BODY MASS INDEX: 41.92 KG/M2 | SYSTOLIC BLOOD PRESSURE: 127 MMHG

## 2023-02-06 PROCEDURE — 99215 OFFICE O/P EST HI 40 MIN: CPT

## 2023-02-06 RX ORDER — FEBUXOSTAT 40 MG/1
40 TABLET ORAL DAILY
Qty: 90 | Refills: 3 | Status: ACTIVE | COMMUNITY
Start: 2023-02-06 | End: 1900-01-01

## 2023-02-06 NOTE — ASSESSMENT
[FreeTextEntry1] : 1.  SLE:  consisting of arthritis, nasal ulcers, Raynauds, alopecia, rash, and nephritis.  Rash was present during late 2014 and 2015, although during the late summer 2015, it wasn't particularly active. However, the rash was quite active in the spring, summer, and into the fall 2016 despite her medical regimen. It was  most prominent on the left arm and back.  Into 2017 the rash quieted down with the use of topical steroids. In 2018 it was intermittently active, but in late 2018/early 2019 the rash was quite active on the face, back, and hands.  She received an injection and topical therapy by her dermatologist.  The rash has remained active on her arms and thighs.  In addition, she has developed red spots (some tender) on the palmar aspects of her fingers. Her rash on the arms started to scale/improve. Arthralgias more prominent in summer, which is what is happening in summer 2019. Fingers, elbows and knees. Buttock rash present in 5/19 faded, but returned on arms, back. She saw VALARIE Flores who prescribed topicals, which have been helpful-as of Jan 2020 the active rash was confined to her buttocks. However, during the first part of 2020 the rash worsened despite the use of topicals.  The rash was quiet in early 2021.  She was not able to reduce prednisone to 7.5 mg/d as her hand arthritis worsened. The rash on her flanks became active, and she used topical steroids (triamcinolone 0.1%) with minimal success.  In Jul 2021 she reported  pain and swelling in her joints, mainly the hands. Rash was active on the RUE in Sep 2021. This improved, but she developed a rash on the right flank. In Feb 2020 there were few active manifestations of SLE. In 2023 she developed rash in the right flank, which partially responded to topical steroids. \par 2.  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  She has been treated with CellCept and remained on 2500 mg daily. She has had low-grade proteinuria. A 24 hour urine was to be collected in November 2016.  I was always concerned she had smoldering LN activity.  In the spring 2017 she stopped the CellCept on her own and then developed a major flare with an increase in creatinine to 2.5 with a urinary Pr/Cr of 5.  She was put back on CellCept and prednisone 40 mg/d.  The biopsy from late September 2017 demonstrated a class IV-G (A and C) lesion with 44% of the glomeruli sclerosed.  In addition, she had tubular atrophy and interstitial fibrosis estimated at about 50% (Activity: 10/24; Chronicity: 9/12).  It was decided despite the chronicity to try pulse steroids. Her creatinine went up but came back down to the high 2's. It dropped further to the low 2's. She was not seen from Dec 2017 until early Mar 2018.  She was doing relatively well clinically.  Lab tests were in order however. With a slight increase in furosemide, the edema improved. The creatinine has come down, albeit not normal. Given her chronic serologic activity and proteinuria, a repeat biopsy is in order.  She had a lot of chronicity on her last biopsy. A repeat biopsy was discussed to determine whether activity persists and whether a new therapeutic approach should be taken. Based on labs there seems to be a mixed picture, and without a kidney biopsy the activity of her kidney cannot be discerned, although she will no doubt have a lot of damage. The biopsy, which was performed in the spring 2019, failed to show activity; however, there was damage.  Therefore, I can't justify changing therapies. \par 3.  Lower extremity edema:  she had an ECHO and was told it was OK.  No back pain.  The edema came and went, but it has been diuretic responsive.  Her regimen of furosemide alternating with hydrochlorothiazide was switched to torsemide. She then went off all diuretics in early November 2017. The edema returned in the spring of 2019 perhaps because of an increase in prednisone. Pt could not tolerate furosemide 60 mg, so went down to 40 mg/d. However, on this dose she had edema, and it was suggested that she raise the dose. The edema disappeared when she took a higher dose of her furosemide. It got much better when she stopped the amlodipine. \par 4.  Chronic CellCept use:  tolerating without GI symptoms.\par 5.  Chronic Plaquenil use: no ocular symptoms\par 6.  Vitamin D deficiency\par 7.  Ocular migraine:  episode in late summer 2015\par 8.  URI\par 9.  Knee pain\par 10.  Angioedema:  episodes in the fall 2015 involving the lips and eyes prompted an evaluation of allergy.  No allergen was identified.  \par 12.  Dysesthesias in the legs: etiology unclear, but this symptom began in early 2016.\par 13.  Muscle cramps: she feels is related to diuretic use.  She cut down by using HCTZ, but this has not been an effective diuretic. \par 14.  Dry eyes: using topicals\par 15.  Hypertension:  remains elevated.  She was seen by GYN in the late spring 2017, her BP was 150/100 as it was on her June 28, 2017 visit. Additional therapy was warranted. Into 2019 her BP remained high. During her first visit of 2020 it was high again-though she had run out of Bystolic. She has noted feeling pre-syncopal at home when arising from a seated position.  With a low BP recorded on Jun 14, 2021, perhaps she is relatively hypotensive at home.  \par 16.  Shingles:  outbreak on the right hemithorax end of October 2017 responsive to Valtrex. \par 17.  Hyperkalemia: she had high potassium's, but it was unclear if they were a result of difficulty drawing blood or from nephritis and/or valsartan.  The valsartan was stopped.  She started another what sounds like ARB (probably eprosartan).\par 18.  GERD: evaluated by CHEL Flores, including an endoscopy.  A variety of drugs failed.  She was referred for a motility evaluation.  \par 19.  Gout:  episode of podagra left foot in August 2018.  She received prednisone with initial relief; however, a recurrence required another course.  No hx of kidney stones, tophi. She then experienced a similar episode in the right great toe necessitating more steroids.  Uloric was prescribed but not taken. In Sept 2019 she presented with 1 month of left 1st MTP pain and swelling. She decided to take Uloric, but in early 2020 stopped. It was restarted, but it may be that she needs a higher dose.  In late Apr 2021 she developed podagra (right great toe) that was unresponsive to increasing prednisone to 15 mg/d. This has lingered far longer than expected and requires 20-40 mg/d of prednisone. \par 20.  Rash: onset of rash left buttock in Oct 2018; etiology unclear but did not seem like shingles.  This rash returned in the spring 2019.  She used topical steroids. \par 21.  Headache: HA relieved with Tylenol during the early part of March 2019.  BPs have not been high enough to explain the HA. \par 22.  Anemia: the pattern appeared to be iron deficiency.  In 2023 she mentioned heavy periods were responsible.\par 23.  Jaw pain: onset of bilateral jaw pain in Jan 2020 for which she attained some relief by increasing the prednisone to 20 mg/d for two days.  Whether it was truly TMJ or perhaps parotid is unclear. It reproducibly improved on steroids. \par 24.  Low back pain: onset in the late spring 2020 of low back pain without trauma or radiation. \par 25.  Muscle cramps: episode in mid-Oct 2020 of diffuse muscle cramps, the cause of which was not known. Perhaps related to diuretics. The cramps were quite bothersome during 2022.\par 26. Paresthesias:  affecting fingertips and toes reported in early 2021. Noted at night. No color changes.  \par 27. DVT: a below the knee DVT in the right leg.  With positive aPL Ab, I decided to treat with warfarin.  For how long is another question. \par 28.  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 Recurrences treated with Valtrex. \par 27.  Waynoka's pain: pain at insertion end of Oct 2021.  I suppose this could be gout. It prevents her from walking and generally subsides in one week. \par 28.  Rash: She reported in Aug 2022 solitary pruritic lesions on her lower back that dry up with steroid cream. \par \par Plan:\par 1.  Lab tests\par 2.  Return 6-8 weeks\par 3.  Reduce MMF to 1 g 2xd\par 4.  Continue q 1-2 weekly INR checks\par 5.  Try to reduce prednisone to 5 mg/d \par 6.  Calcium 600 mg 2xd\par 7.  May need parenteral iron\par 8.  ? SGLT2I\par 9.  High risk medications used in the treatment of rheumatic diseases include steroids, disease modifying agents, immunosuppressive therapies, antimalarials, biologics, and chemotherapy. Regardless of which drug or class of drug, the potential toxicities of these therapies mandate close monitoring in the form of a history, physical, and laboratory tests.\par 10.  Systemic Lupus Erythematosus, known as lupus, is a chronic autoimmune disease that can affect any organ in the body posing threats to proper organ function and even to life. Therefore, close surveillance of all bodily functions is required, including but not limited to central and peripheral nervous system, ocular and auditory systems, cardiopulmonary function, kidney function, mucocutaneous and musculoskeletal systems as well as constitutional manifestations. Surveillance consists of history, physical, and laboratory tests. Treatment varies, but most of the drugs used are high risk and therefore also require close monitoring in the form of blood and urine tests.\par \par   \par \par \par

## 2023-02-06 NOTE — PHYSICAL EXAM
[General Appearance - Alert] : alert [General Appearance - In No Acute Distress] : in no acute distress [General Appearance - Well Nourished] : well nourished [General Appearance - Well Developed] : well developed [General Appearance - Well-Appearing] : healthy appearing [Sclera] : the sclera and conjunctiva were normal [Extraocular Movements] : extraocular movements were intact [Strabismus] : no strabismus was seen [Outer Ear] : the ears and nose were normal in appearance [Examination Of The Oral Cavity] : the lips and gums were normal [Nasal Cavity] : the nasal mucosa and septum were normal [Oropharynx] : the oropharynx was normal [Neck Appearance] : the appearance of the neck was normal [Neck Cervical Mass (___cm)] : no neck mass was observed [Jugular Venous Distention Increased] : there was no jugular-venous distention [Thyroid Diffuse Enlargement] : the thyroid was not enlarged [Respiration, Rhythm And Depth] : normal respiratory rhythm and effort [Exaggerated Use Of Accessory Muscles For Inspiration] : no accessory muscle use [Auscultation Breath Sounds / Voice Sounds] : lungs were clear to auscultation bilaterally [Heart Rate And Rhythm] : heart rate was normal and rhythm regular [Heart Sounds] : normal S1 and S2 [Heart Sounds Gallop] : no gallops [Murmurs] : no murmurs [Heart Sounds Pericardial Friction Rub] : no pericardial rub [Veins - Varicosity Changes] : there were no varicosital changes [Bowel Sounds] : normal bowel sounds [Abdomen Soft] : soft [Abdomen Tenderness] : non-tender [] : no hepato-splenomegaly [Abdomen Mass (___ Cm)] : no abdominal mass palpated [Cervical Lymph Nodes Enlarged Posterior Bilaterally] : posterior cervical [Cervical Lymph Nodes Enlarged Anterior Bilaterally] : anterior cervical [Supraclavicular Lymph Nodes Enlarged Bilaterally] : supraclavicular [No CVA Tenderness] : no ~M costovertebral angle tenderness [No Spinal Tenderness] : no spinal tenderness [Abnormal Walk] : normal gait [Nail Clubbing] : no clubbing  or cyanosis of the fingernails [Motor Tone] : muscle strength and tone were normal [Skin Turgor] : normal skin turgor [FreeTextEntry1] : scattered hyperpigmentation; active rash about the size of a half dollar right flank [Sensation] : the sensory exam was normal to light touch and pinprick [Motor Exam] : the motor exam was normal [Oriented To Time, Place, And Person] : oriented to person, place, and time [Impaired Insight] : insight and judgment were intact [Affect] : the affect was normal [Mood] : the mood was normal

## 2023-02-06 NOTE — HISTORY OF PRESENT ILLNESS
[FreeTextEntry1] : 1.  SLE:  consisting of arthritis, nasal ulcers, Raynauds, alopecia, rash, and nephritis.  Rash was present during late 2014 and 2015, although during the late summer 2015, it wasn't particularly active. However, the rash was quite active in the spring, summer, and into the fall 2016 despite her medical regimen. It was  most prominent on the left arm and back.  Into 2017 the rash quieted down with the use of topical steroids. In 2018 it was intermittently active, but in late 2018/early 2019 the rash was quite active on the face, back, and hands.  She received an injection and topical therapy by her dermatologist.  The rash has remained active on her arms and thighs.  In addition, she has developed red spots (some tender) on the palmar aspects of her fingers. Her rash on the arms started to scale/improve. Arthralgias more prominent in summer, which is what is happening in summer 2019. Fingers, elbows and knees. Buttock rash present in 5/19 faded, but returned on arms, back. She saw VALARIE Flores who prescribed topicals, which have been helpful-as of Jan 2020 the active rash was confined to her buttocks. However, during the first part of 2020 the rash worsened despite the use of topicals.  The rash was quiet in early 2021.  She was not able to reduce prednisone to 7.5 mg/d as her hand arthritis worsened. The rash on her flanks became active, and she used topical steroids (triamcinolone 0.1%) with minimal success.  In Jul 2021 she reported  pain and swelling in her joints, mainly the hands. Rash was active on the RUE in Sep 2021. This improved, but she developed a rash on the right flank. In Feb 2020 there were few active manifestations of SLE. In 2023 she developed rash in the right flank, which partially responded to topical steroids. \par 2.  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  She has been treated with CellCept and remained on 2500 mg daily. She has had low-grade proteinuria. A 24 hour urine was to be collected in November 2016.  I was always concerned she had smoldering LN activity.  In the spring 2017 she stopped the CellCept on her own and then developed a major flare with an increase in creatinine to 2.5 with a urinary Pr/Cr of 5.  She was put back on CellCept and prednisone 40 mg/d.  The biopsy from late September 2017 demonstrated a class IV-G (A and C) lesion with 44% of the glomeruli sclerosed.  In addition, she had tubular atrophy and interstitial fibrosis estimated at about 50% (Activity: 10/24; Chronicity: 9/12).  It was decided despite the chronicity to try pulse steroids. Her creatinine went up but came back down to the high 2's. It dropped further to the low 2's. She was not seen from Dec 2017 until early Mar 2018.  She was doing relatively well clinically.  Lab tests were in order however. With a slight increase in furosemide, the edema improved. The creatinine has come down, albeit not normal. Given her chronic serologic activity and proteinuria, a repeat biopsy is in order.  She had a lot of chronicity on her last biopsy. A repeat biopsy was discussed to determine whether activity persists and whether a new therapeutic approach should be taken. Based on labs there seems to be a mixed picture, and without a kidney biopsy the activity of her kidney cannot be discerned, although she will no doubt have a lot of damage. The biopsy, which was performed in the spring 2019, failed to show activity; however, there was damage.  Therefore, I can't justify changing therapies. \par 3.  Lower extremity edema:  she had an ECHO and was told it was OK.  No back pain.  The edema came and went, but it has been diuretic responsive.  Her regimen of furosemide alternating with hydrochlorothiazide was switched to torsemide. She then went off all diuretics in early November 2017. The edema returned in the spring of 2019 perhaps because of an increase in prednisone. Pt could not tolerate furosemide 60 mg, so went down to 40 mg/d. However, on this dose she had edema, and it was suggested that she raise the dose. The edema disappeared when she took a higher dose of her furosemide. It got much better when she stopped the amlodipine. \par 4.  Chronic CellCept use:  tolerating without GI symptoms.\par 5.  Chronic Plaquenil use: no ocular symptoms\par 6.  Vitamin D deficiency\par 7.  Ocular migraine:  episode in late summer 2015\par 8.  URI\par 9.  Knee pain\par 10.  Angioedema:  episodes in the fall 2015 involving the lips and eyes prompted an evaluation of allergy.  No allergen was identified.  \par 12.  Dysesthesias in the legs: etiology unclear, but this symptom began in early 2016.\par 13.  Muscle cramps: she feels is related to diuretic use.  She cut down by using HCTZ, but this has not been an effective diuretic. \par 14.  Dry eyes: using topicals\par 15.  Hypertension:  remains elevated.  She was seen by GYN in the late spring 2017, her BP was 150/100 as it was on her June 28, 2017 visit. Additional therapy was warranted. Into 2019 her BP remained high. During her first visit of 2020 it was high again-though she had run out of Bystolic. She has noted feeling pre-syncopal at home when arising from a seated position.  With a low BP recorded on Jun 14, 2021, perhaps she is relatively hypotensive at home.  \par 16.  Shingles:  outbreak on the right hemithorax end of October 2017 responsive to Valtrex. \par 17.  Hyperkalemia: she had high potassium's, but it was unclear if they were a result of difficulty drawing blood or from nephritis and/or valsartan.  The valsartan was stopped.  She started another what sounds like ARB (probably eprosartan).\par 18.  GERD: evaluated by CHEL Flores, including an endoscopy.  A variety of drugs failed.  She was referred for a motility evaluation.  \par 19.  Gout:  episode of podagra left foot in August 2018.  She received prednisone with initial relief; however, a recurrence required another course.  No hx of kidney stones, tophi. She then experienced a similar episode in the right great toe necessitating more steroids.  Uloric was prescribed but not taken. In Sept 2019 she presented with 1 month of left 1st MTP pain and swelling. She decided to take Uloric, but in early 2020 stopped. It was restarted, but it may be that she needs a higher dose.  In late Apr 2021 she developed podagra (right great toe) that was unresponsive to increasing prednisone to 15 mg/d. This has lingered far longer than expected and requires 20-40 mg/d of prednisone. \par 20.  Rash: onset of rash left buttock in Oct 2018; etiology unclear but did not seem like shingles.  This rash returned in the spring 2019.  She used topical steroids. \par 21.  Headache: HA relieved with Tylenol during the early part of March 2019.  BPs have not been high enough to explain the HA. \par 22.  Anemia: the pattern appeared to be iron deficiency.  In 2023 she mentioned heavy periods were responsible.\par 23.  Jaw pain: onset of bilateral jaw pain in Jan 2020 for which she attained some relief by increasing the prednisone to 20 mg/d for two days.  Whether it was truly TMJ or perhaps parotid is unclear. It reproducibly improved on steroids. \par 24.  Low back pain: onset in the late spring 2020 of low back pain without trauma or radiation. \par 25.  Muscle cramps: episode in mid-Oct 2020 of diffuse muscle cramps, the cause of which was not known. Perhaps related to diuretics. The cramps were quite bothersome during 2022.\par 26. Paresthesias:  affecting fingertips and toes reported in early 2021. Noted at night. No color changes.  \par 27. DVT: a below the knee DVT in the right leg.  With positive aPL Ab, I decided to treat with warfarin.  For how long is another question. \par 28.  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 Recurrences treated with Valtrex. \par 27.  Bennington's pain: pain at insertion end of Oct 2021.  I suppose this could be gout. It prevents her from walking and generally subsides in one week. \par 28.  Rash: She reported in Aug 2022 solitary pruritic lesions on her lower back that dry up with steroid cream. \par \par Meds\par warfarin 10 mg/d\par Uloric 40 mg/d  \par furosemide 40 mg/d  \par prednisone 10 mg/d  \par CellCept 2500 mg/d in divided doses\par irbesartan 300 mg \par Bystolic 10 mg/d\par Plaquenil 200 mg/d weekdays and 400 mg/d weekends\par Vit D 5000 IU/d\par Valtrex prn\par Ferrex 150 mg/d\par \par Vaccines\par PNVX 3/24/2008\par PNVX 1/7/2013 ([id number]; [id number])\par PNVX 23  2/14/19  ([id number]; 8/2/20)\par Prevnar 13 valent 2/3/2015 ([id number]; exp 3/16)\par Quadrivalent fluzone  9/29/2015 ( AA; exp 6/2016)\par Flu Quadrivalent 09/27/2021 (UT [id number]; exp 6/30/2022)\par Shingrix 8/15/22  ([id number], exp 3/25/23; [id number]; exp 3/25/23)\par \par

## 2023-02-07 LAB
25(OH)D3 SERPL-MCNC: 20.9 NG/ML
ALBUMIN SERPL ELPH-MCNC: 3.8 G/DL
ALP BLD-CCNC: 80 U/L
ALT SERPL-CCNC: 16 U/L
ANION GAP SERPL CALC-SCNC: 10 MMOL/L
APPEARANCE: ABNORMAL
AST SERPL-CCNC: 14 U/L
BACTERIA: ABNORMAL
BASOPHILS # BLD AUTO: 0.26 K/UL
BASOPHILS NFR BLD AUTO: 1.7 %
BILIRUB SERPL-MCNC: 0.2 MG/DL
BILIRUBIN URINE: NEGATIVE
BLOOD URINE: ABNORMAL
BUN SERPL-MCNC: 27 MG/DL
C3 SERPL-MCNC: 85 MG/DL
C4 SERPL-MCNC: 14 MG/DL
CALCIUM SERPL-MCNC: 9.6 MG/DL
CHLORIDE SERPL-SCNC: 112 MMOL/L
CHOLEST SERPL-MCNC: 183 MG/DL
CK SERPL-CCNC: 50 U/L
CO2 SERPL-SCNC: 17 MMOL/L
COLOR: ABNORMAL
CREAT SERPL-MCNC: 1.91 MG/DL
CREAT SPEC-SCNC: 191 MG/DL
CREAT/PROT UR: 0.1 RATIO
DSDNA AB SER-ACNC: 28 IU/ML
EGFR: 32 ML/MIN/1.73M2
EOSINOPHIL # BLD AUTO: 0 K/UL
EOSINOPHIL NFR BLD AUTO: 0 %
ESTIMATED AVERAGE GLUCOSE: 105 MG/DL
GLUCOSE QUALITATIVE U: NEGATIVE
HBA1C MFR BLD HPLC: 5.3 %
HCT VFR BLD CALC: 29.7 %
HDLC SERPL-MCNC: 47 MG/DL
HGB BLD-MCNC: 8.8 G/DL
HYALINE CASTS: 0 /LPF
INR PPP: 2.99 RATIO
IRON SATN MFR SERPL: 7 %
IRON SERPL-MCNC: 25 UG/DL
KETONES URINE: NEGATIVE
LDLC SERPL CALC-MCNC: 94 MG/DL
LEUKOCYTE ESTERASE URINE: ABNORMAL
LYMPHOCYTES # BLD AUTO: 3.99 K/UL
LYMPHOCYTES NFR BLD AUTO: 26.3 %
MAN DIFF?: NORMAL
MCHC RBC-ENTMCNC: 22.2 PG
MCHC RBC-ENTMCNC: 29.6 GM/DL
MCV RBC AUTO: 74.8 FL
MICROSCOPIC-UA: NORMAL
MONOCYTES # BLD AUTO: 0.8 K/UL
MONOCYTES NFR BLD AUTO: 5.3 %
NEUTROPHILS # BLD AUTO: 10.11 K/UL
NEUTROPHILS NFR BLD AUTO: 66.7 %
NITRITE URINE: NEGATIVE
NONHDLC SERPL-MCNC: 136 MG/DL
PH URINE: 6
PLATELET # BLD AUTO: 204 K/UL
POTASSIUM SERPL-SCNC: 5 MMOL/L
PROT SERPL-MCNC: 6.6 G/DL
PROT UR-MCNC: 19 MG/DL
PROTEIN URINE: ABNORMAL
PT BLD: 35.1 SEC
RBC # BLD: 3.97 M/UL
RBC # BLD: 3.97 M/UL
RBC # FLD: 21.2 %
RED BLOOD CELLS URINE: 122 /HPF
RETICS # AUTO: 2.7 %
RETICS AGGREG/RBC NFR: 106.4 K/UL
SODIUM SERPL-SCNC: 140 MMOL/L
SPECIFIC GRAVITY URINE: 1.01
SQUAMOUS EPITHELIAL CELLS: 14 /HPF
TIBC SERPL-MCNC: 375 UG/DL
TRIGL SERPL-MCNC: 209 MG/DL
UIBC SERPL-MCNC: 350 UG/DL
URATE SERPL-MCNC: 4.6 MG/DL
URINE COMMENTS: NORMAL
UROBILINOGEN URINE: NORMAL
WBC # FLD AUTO: 15.16 K/UL
WHITE BLOOD CELLS URINE: 136 /HPF

## 2023-02-10 ENCOUNTER — RX RENEWAL (OUTPATIENT)
Age: 53
End: 2023-02-10

## 2023-02-14 ENCOUNTER — RX RENEWAL (OUTPATIENT)
Age: 53
End: 2023-02-14

## 2023-02-19 ENCOUNTER — FORM ENCOUNTER (OUTPATIENT)
Age: 53
End: 2023-02-19

## 2023-03-01 ENCOUNTER — RX RENEWAL (OUTPATIENT)
Age: 53
End: 2023-03-01

## 2023-03-22 ENCOUNTER — APPOINTMENT (OUTPATIENT)
Dept: GYNECOLOGIC ONCOLOGY | Facility: CLINIC | Age: 53
End: 2023-03-22
Payer: COMMERCIAL

## 2023-03-31 ENCOUNTER — APPOINTMENT (OUTPATIENT)
Dept: GASTROENTEROLOGY | Facility: CLINIC | Age: 53
End: 2023-03-31
Payer: COMMERCIAL

## 2023-03-31 ENCOUNTER — NON-APPOINTMENT (OUTPATIENT)
Age: 53
End: 2023-03-31

## 2023-03-31 VITALS
DIASTOLIC BLOOD PRESSURE: 80 MMHG | OXYGEN SATURATION: 97 % | HEART RATE: 114 BPM | SYSTOLIC BLOOD PRESSURE: 112 MMHG | TEMPERATURE: 97.1 F | BODY MASS INDEX: 43.4 KG/M2 | WEIGHT: 293 LBS | HEIGHT: 69 IN

## 2023-03-31 DIAGNOSIS — R12 HEARTBURN: ICD-10-CM

## 2023-03-31 DIAGNOSIS — Z12.11 ENCOUNTER FOR SCREENING FOR MALIGNANT NEOPLASM OF COLON: ICD-10-CM

## 2023-03-31 DIAGNOSIS — R13.10 DYSPHAGIA, UNSPECIFIED: ICD-10-CM

## 2023-03-31 PROCEDURE — 99204 OFFICE O/P NEW MOD 45 MIN: CPT

## 2023-03-31 RX ORDER — POLYETHYLENE GLYCOL 3350 AND ELECTROLYTES WITH LEMON FLAVOR 236; 22.74; 6.74; 5.86; 2.97 G/4L; G/4L; G/4L; G/4L; G/4L
236 POWDER, FOR SOLUTION ORAL
Qty: 1 | Refills: 0 | Status: ACTIVE | COMMUNITY
Start: 2023-03-31 | End: 1900-01-01

## 2023-03-31 NOTE — HISTORY OF PRESENT ILLNESS
[FreeTextEntry1] : 51 y/o F with SLE on 10mg prednisone, cellcept, and plaquenil, DVT on warfarin, and Hx of GERD and upper GI complaints previously worked up with unremarkable EGD and impedance testing, presenting for eval of same complaints. Pt thinks symptoms started after SLE flare requiring hospitalization and IV steroids. Reports foul taste in mouth and vomiting with meat and pork and sensation that bread gets stuck soon after swallowing. Denies these complaints with any other foods, including pasta, crackers etc. On regular basis, denies dysphagia, odynophagia, abdominal pain, N/V. She does report GERD symptoms for which she uses pepcid PRN.\par \par Bowel habits are stable with 2-3 soft to loose BM daily. Denies bleeding or irregular bowel habits. Never had colonoscopy.\par \par Social Hx: works at [employer]. Denies smoking, alcohol, or illicit drug use.\par \par Family Hx of colon polyps in father and pancreatic ca in maternal grandmother

## 2023-03-31 NOTE — ADDENDUM
[FreeTextEntry1] : Attending addendum:\par Dysphagia, chronic\par Cannot rule out ring, stricture, neoplasm\par Screening, average risk for colon cancer and polyps\par Indications risks benefits and alternatives to both upper endoscopy and colonoscopy reviewed\par Patient agreeable to examination\par Patient understands that she requires presurgical testing\par Examination in the hospital endoscopy unit\par Patient noted to be on warfarin for history of deep venous thrombosis, possible antiphospholipid antibodies\par \par Patient referred by Dr. Kulwant Greco\par We will check in with rheumatology regarding recommendations to hold warfarin prior to colonoscopy endoscopy

## 2023-03-31 NOTE — ASSESSMENT
[FreeTextEntry1] : 51 y/o F with SLE on 10mg prednisone, cellcept, and plaquenil, DVT on warfarin, and Hx of GERD and upper GI complaints previously worked up with unremarkable EGD and impedance testing, presenting for eval of same complaints.\par \par # GERD\par # Dysphagia? r/o EOE\par - plan for repeat EGD to reassess complaints\par - consider motility and/or allergy testing if endoscopic eval neg\par \par # CRC screening\par - plan for colonoscopy at time of EGD\par - R/B discussed with pt and she is agreeable to procedure\par \par f/u post procedure\par \par \par Candelario Killian DO\par [facility]

## 2023-03-31 NOTE — PHYSICAL EXAM
[Alert] : alert [Healthy Appearing] : healthy appearing [No Acute Distress] : no acute distress [Sclera] : the sclera and conjunctiva were normal [Hearing Threshold Finger Rub Not [place]] : hearing was normal [Normal Appearance] : the appearance of the neck was normal [No Acc Muscle Use] : no accessory muscle use [Heart Rate And Rhythm] : heart rate was normal and rhythm regular [Bowel Sounds] : normal bowel sounds [Abdomen Tenderness] : non-tender [No Masses] : no abdominal mass palpated [Abdomen Soft] : soft [No CVA Tenderness] : no CVA  tenderness [Abnormal Walk] : normal gait [] : no rash [No Focal Deficits] : no focal deficits [Oriented To Time, Place, And Person] : oriented to person, place, and time [Normal Insight/Judgment] : insight and judgment were intact

## 2023-04-11 RX ORDER — FERUMOXYTOL 510 MG/17ML
510 INJECTION INTRAVENOUS
Qty: 0 | Refills: 0 | Status: COMPLETED | OUTPATIENT
Start: 2023-04-06 | End: 1900-01-01

## 2023-04-14 ENCOUNTER — APPOINTMENT (OUTPATIENT)
Dept: MAMMOGRAPHY | Facility: IMAGING CENTER | Age: 53
End: 2023-04-14
Payer: COMMERCIAL

## 2023-04-14 ENCOUNTER — APPOINTMENT (OUTPATIENT)
Dept: ULTRASOUND IMAGING | Facility: IMAGING CENTER | Age: 53
End: 2023-04-14
Payer: COMMERCIAL

## 2023-04-14 ENCOUNTER — OUTPATIENT (OUTPATIENT)
Dept: OUTPATIENT SERVICES | Facility: HOSPITAL | Age: 53
LOS: 1 days | End: 2023-04-14
Payer: COMMERCIAL

## 2023-04-14 DIAGNOSIS — Z00.8 ENCOUNTER FOR OTHER GENERAL EXAMINATION: ICD-10-CM

## 2023-04-14 PROCEDURE — 77063 BREAST TOMOSYNTHESIS BI: CPT | Mod: 26

## 2023-04-14 PROCEDURE — 76641 ULTRASOUND BREAST COMPLETE: CPT | Mod: 26,50

## 2023-04-14 PROCEDURE — 77067 SCR MAMMO BI INCL CAD: CPT

## 2023-04-14 PROCEDURE — 77063 BREAST TOMOSYNTHESIS BI: CPT

## 2023-04-14 PROCEDURE — 76641 ULTRASOUND BREAST COMPLETE: CPT

## 2023-04-14 PROCEDURE — 77067 SCR MAMMO BI INCL CAD: CPT | Mod: 26

## 2023-04-18 ENCOUNTER — RX RENEWAL (OUTPATIENT)
Age: 53
End: 2023-04-18

## 2023-04-19 ENCOUNTER — APPOINTMENT (OUTPATIENT)
Dept: GYNECOLOGIC ONCOLOGY | Facility: CLINIC | Age: 53
End: 2023-04-19
Payer: COMMERCIAL

## 2023-04-19 VITALS
DIASTOLIC BLOOD PRESSURE: 82 MMHG | WEIGHT: 292 LBS | HEIGHT: 68 IN | SYSTOLIC BLOOD PRESSURE: 110 MMHG | BODY MASS INDEX: 44.25 KG/M2

## 2023-04-19 DIAGNOSIS — Z80.3 FAMILY HISTORY OF MALIGNANT NEOPLASM OF BREAST: ICD-10-CM

## 2023-04-19 DIAGNOSIS — N87.0 MILD CERVICAL DYSPLASIA: ICD-10-CM

## 2023-04-19 PROCEDURE — 99204 OFFICE O/P NEW MOD 45 MIN: CPT | Mod: 25

## 2023-04-19 PROCEDURE — 58100 BIOPSY OF UTERUS LINING: CPT | Mod: 59,52

## 2023-04-19 RX ORDER — AMLODIPINE BESYLATE 10 MG/1
10 TABLET ORAL DAILY
Refills: 0 | Status: DISCONTINUED | COMMUNITY
End: 2023-04-19

## 2023-04-19 RX ORDER — VALSARTAN 80 MG/1
80 TABLET, COATED ORAL DAILY
Qty: 60 | Refills: 3 | Status: DISCONTINUED | COMMUNITY
End: 2023-04-19

## 2023-04-19 RX ORDER — PREDNISONE 5 MG/1
5 TABLET ORAL DAILY
Qty: 180 | Refills: 3 | Status: DISCONTINUED | COMMUNITY
Start: 2021-07-22 | End: 2023-04-19

## 2023-04-19 RX ORDER — COLCHICINE 0.6 MG/1
0.6 CAPSULE ORAL
Qty: 90 | Refills: 3 | Status: DISCONTINUED | COMMUNITY
Start: 2018-10-30 | End: 2023-04-19

## 2023-04-19 RX ORDER — WARFARIN 1 MG/1
1 TABLET ORAL DAILY
Qty: 360 | Refills: 3 | Status: DISCONTINUED | COMMUNITY
Start: 2021-06-02 | End: 2023-04-19

## 2023-04-19 RX ORDER — FUROSEMIDE 20 MG/1
20 TABLET ORAL DAILY
Qty: 270 | Refills: 3 | Status: DISCONTINUED | COMMUNITY
End: 2023-04-19

## 2023-04-19 RX ORDER — FUROSEMIDE 80 MG/1
80 TABLET ORAL
Qty: 90 | Refills: 3 | Status: DISCONTINUED | COMMUNITY
Start: 2021-01-04 | End: 2023-04-19

## 2023-04-19 RX ORDER — VALACYCLOVIR 1 G/1
1 TABLET, FILM COATED ORAL 3 TIMES DAILY
Qty: 21 | Refills: 3 | Status: DISCONTINUED | COMMUNITY
Start: 2021-07-14 | End: 2023-04-19

## 2023-04-19 RX ORDER — TRIAMCINOLONE ACETONIDE 5 MG/G
0.5 CREAM TOPICAL
Qty: 30 | Refills: 3 | Status: DISCONTINUED | COMMUNITY
Start: 2021-06-14 | End: 2023-04-19

## 2023-04-19 RX ORDER — PREDNISONE 10 MG/1
10 TABLET ORAL DAILY
Qty: 120 | Refills: 3 | Status: DISCONTINUED | COMMUNITY
Start: 2021-05-03 | End: 2023-04-19

## 2023-04-19 RX ORDER — KETOCONAZOLE 20 MG/G
2 CREAM TOPICAL
Qty: 2 | Refills: 5 | Status: DISCONTINUED | COMMUNITY
Start: 2021-07-14 | End: 2023-04-19

## 2023-04-19 RX ORDER — AMLODIPINE BESYLATE 10 MG/1
10 TABLET ORAL DAILY
Qty: 90 | Refills: 3 | Status: DISCONTINUED | COMMUNITY
Start: 2019-02-14 | End: 2023-04-19

## 2023-04-19 RX ORDER — WARFARIN 5 MG/1
5 TABLET ORAL DAILY
Qty: 90 | Refills: 3 | Status: DISCONTINUED | COMMUNITY
Start: 2022-03-23 | End: 2023-04-19

## 2023-04-19 NOTE — DISCUSSION/SUMMARY
[Reviewed Radiology Film/Image(s)] : Images from radiology studies were reviewed and examined. [Pre Op] : The differential diagnosis was discussed in detail. The indications, risks, benefits and alternatives were discussed. [unfilled] expressed an understanding of the treatment rationale and her questions were answered to her apparent satisfaction.

## 2023-04-20 NOTE — PROCEDURE
[Endometrial Biopsy] : an endometrial biopsy [Other: ___] : [unfilled] [Patient] : the patient [Written consent] : written consent was obtained prior to the procedure and is detailed in the patient's record [1% Lidocaine ___(ml)] :  [unfilled]Uml of 1% Lidocaine [Betadine] : betadine [Jacqueline's] : Jacqueline's solution [Silver Nitrate] : silver nitrate [Direct Pressure] : direct pressure [No Complications] : none [Tolerated Well] : the patient tolerated the procedure well [0] : 0 [FreeTextEntry1] : Pipelle unable to pass through cervical os to obtain specimen. Procedure was aborted.\par \par Pelvic rest 48-72 hours.\par

## 2023-04-20 NOTE — ASSESSMENT
[FreeTextEntry1] : 53 yo female with PMHx of SLE with HR HPV, LGSIL PAP and KAREEM 1-2 on cervical biopsies. \par \par I discussed with the patient that the most appropriate next step for her would be to have a cold knife conization of her cervix. We discussed that should the pathology from that procedure reveal cancer, this would change the recommended surgery, as it would require a larger incision surgery rather than a minimally invasive surgery. She expressed her understanding. I also discussed my rationale for the attempt at today's endometrial biopsy. Given the change in her menses and her increased BMI, I would like to rule out any pre-cancer or cancer in her uterus as well. Since today's biopsy was unsuccessful, I will obtain a sample in the OR at the same time as her CKC.\par \par I discussed at length with the patient the risks, benefits, and alternatives to cold knife conization of the cervix, as well as dilation and curettage with hysteroscopy under sonogram guidance.  The patient understands the advantages to cold knife conization over LEEP.  She also understands the risks to include (but not be limited to): cervical stenosis and possible infertility; infection with need for hospitalization; bleeding with need for transfusion; and cervical incompetence with difficulty holding future pregnancies to term.  The patient agrees to proceed.  She understands that her dysplasia is caused by the human papilloma virus and that our planned procedure will not cure her of the underlying viral infection but, rather, will only treat whatever disease is present.\par \par All questions and concerns were answered to patient's apparent satisfaction.\par \par \par

## 2023-04-20 NOTE — HISTORY OF PRESENT ILLNESS
[FreeTextEntry1] : 51yo  via  x2 LMP 3/25/23 referred by Dr. Sarahy Espino for evaluation of LGSIL, HR HPV+ and KAREEM 2. Pt had a LEEP in 2021 for LGSIL with negative pathology and only inflammation. Her most recent pap smear in 23 revealed LSIL, HR HPV(+). She followed up for colposcopy on 23 with cervical biopsies and ECC. Pathology revealed benign ECC, benign 1-2 o'clock and 4 o'clock cervical biopsies, CIN1 on 12 o'clock biopsy, and at least low grade lesion (KAREEM 2 cannot be excluded) on 6 o'clock biopsy. Patient also reports heavy menses. She has a history of PCOS and endorsed abnormal bleeding throughout her life that normalized after she had children. She reports over the last year her menses have become very heavy with clotting, lasting anywhere from 7-10 days, every 20 days. She reports at least 5 days of heavy bleeding and goes through about 10 maxi pads daily. She denies recent sampling of her uterine lining. She has been noted to be anemic and has an appointment for iron infusion scheduled. Denies unintentional weight loss, abdominal or pelvis pain, change in bladder or bowel habits, hematochezia. \par \par MRI pelvis: 23- Normal size AV uterus, unremarkable cervix, 14mm endometrium, no visible IUD, small posterior intramural fibroids measuring up to 1.8cm. \par \par Of note, patient has a history of SLE, follows with Dr. Guerra. Pt is on prednisone, cellcept, hydroxychloroquine. History of DVT prior to  managed with warfarin. \par \par Last mammo: 23- WNL, NW\par Last colo: Never. Scheduled 2023

## 2023-04-20 NOTE — END OF VISIT
[FreeTextEntry3] : This note was written by Aysha Cody, acting as a scribe for Dr. Echo Kim.\par This note accurately reflects the work and decisions made by me.\par

## 2023-04-20 NOTE — CHIEF COMPLAINT
[FreeTextEntry1] : [facility] \par [address] \[place] [address]\par \par CIN1-2

## 2023-04-20 NOTE — PHYSICAL EXAM
[Chaperone Present] : A chaperone was present in the examining room during all aspects of the physical examination [Normal] : Bimanual Exam: Normal [FreeTextEntry1] : Name of chaperone: Aysha Cody PA-C. [de-identified] : Cervix measures 3cm AP x 2.5cm transversely [de-identified] : Rectovaginal examination deferred to OR.

## 2023-05-01 ENCOUNTER — APPOINTMENT (OUTPATIENT)
Dept: RHEUMATOLOGY | Facility: CLINIC | Age: 53
End: 2023-05-01
Payer: COMMERCIAL

## 2023-05-01 VITALS
RESPIRATION RATE: 16 BRPM | OXYGEN SATURATION: 97 % | DIASTOLIC BLOOD PRESSURE: 87 MMHG | HEART RATE: 80 BPM | SYSTOLIC BLOOD PRESSURE: 122 MMHG

## 2023-05-01 VITALS
SYSTOLIC BLOOD PRESSURE: 106 MMHG | RESPIRATION RATE: 16 BRPM | DIASTOLIC BLOOD PRESSURE: 73 MMHG | OXYGEN SATURATION: 100 % | BODY MASS INDEX: 44.4 KG/M2 | WEIGHT: 292 LBS | TEMPERATURE: 97.5 F | HEART RATE: 77 BPM

## 2023-05-01 PROCEDURE — 96365 THER/PROPH/DIAG IV INF INIT: CPT

## 2023-05-01 RX ORDER — FERUMOXYTOL 510 MG/17ML
510 INJECTION INTRAVENOUS
Qty: 0 | Refills: 0 | Status: COMPLETED
Start: 2023-04-06

## 2023-05-01 NOTE — HISTORY OF PRESENT ILLNESS
[Denies] : Denies [No] : No [N/A] : N/A [Declined] : Declined [Left upper extremity] : Left upper extremity [24g] : 24g [Start Time: ___] : Medication Start Time: [unfilled] [End Time: ___] : Medication End Time: [unfilled] [IV discontinued. Intact. No signs or symptoms of IV complications noted. Time: ___] : IV discontinued. Intact. No signs or symptoms of IV complications noted. Time: [unfilled] [Patient  instructed to seek medical attention with signs and symptoms of adverse effects] : Patient  instructed to seek medical attention with signs and symptoms of adverse effects [Patient left unit in no acute distress] : Patient left unit in no acute distress [Medications administered as ordered and tolerated well.] : Medications administered as ordered and tolerated well. [de-identified] : left forearm  [de-identified] : Patient presents for 1:2 Feraheme infusion, doing well overall. Patient denies any recent infection or antibiotic use. Patient reports feeling extremely tired, coldness to B/L hands and feet as well as dizzy/lightheaded occasionally. No other symptoms or concerns noted at this time. Patient infused for 30 minutes and observed for 30 minutes post infusion. Patient tolerated infusion well.

## 2023-05-04 NOTE — ED ADULT NURSE NOTE - CHIEF COMPLAINT QUOTE
care notes on RALP, Harrison care, incision action plan pain after surgery Abnormal labs, INR was 11.5 on outpt tests, takes warfarin daily. Swelling to left arm starting yesterday. carenoyes on RALP, carbone care, pain after surgery carenotes on RALP, carbone care, incision action plan, pain after surgery

## 2023-05-08 ENCOUNTER — APPOINTMENT (OUTPATIENT)
Dept: RHEUMATOLOGY | Facility: CLINIC | Age: 53
End: 2023-05-08
Payer: COMMERCIAL

## 2023-05-08 VITALS
RESPIRATION RATE: 16 BRPM | OXYGEN SATURATION: 99 % | DIASTOLIC BLOOD PRESSURE: 78 MMHG | SYSTOLIC BLOOD PRESSURE: 110 MMHG | TEMPERATURE: 97.5 F | HEART RATE: 86 BPM

## 2023-05-08 VITALS
DIASTOLIC BLOOD PRESSURE: 66 MMHG | RESPIRATION RATE: 16 BRPM | OXYGEN SATURATION: 97 % | SYSTOLIC BLOOD PRESSURE: 94 MMHG | HEART RATE: 77 BPM

## 2023-05-08 DIAGNOSIS — I99.9 UNSPECIFIED DISORDER OF CIRCULATORY SYSTEM: ICD-10-CM

## 2023-05-08 DIAGNOSIS — L30.9 DERMATITIS, UNSPECIFIED: ICD-10-CM

## 2023-05-08 PROCEDURE — ZZZZZ: CPT

## 2023-05-08 PROCEDURE — 99215 OFFICE O/P EST HI 40 MIN: CPT | Mod: 25

## 2023-05-08 PROCEDURE — 96365 THER/PROPH/DIAG IV INF INIT: CPT

## 2023-05-08 RX ORDER — FERUMOXYTOL 510 MG/17ML
510 INJECTION INTRAVENOUS
Qty: 0 | Refills: 0 | Status: COMPLETED
Start: 2023-04-06

## 2023-05-08 NOTE — HISTORY OF PRESENT ILLNESS
[FreeTextEntry1] : 1.  SLE:  consisting of arthritis, nasal ulcers, Raynauds, alopecia, rash, and nephritis.  Rash was present during late 2014 and 2015, although during the late summer 2015, it wasn't particularly active. However, the rash was quite active in the spring, summer, and into the fall 2016 despite her medical regimen. It was  most prominent on the left arm and back.  Into 2017 the rash quieted down with the use of topical steroids. In 2018 it was intermittently active, but in late 2018/early 2019 the rash was quite active on the face, back, and hands.  She received an injection and topical therapy by her dermatologist.  The rash has remained active on her arms and thighs.  In addition, she has developed red spots (some tender) on the palmar aspects of her fingers. Her rash on the arms started to scale/improve. Arthralgias more prominent in summer, which is what is happening in summer 2019. Fingers, elbows and knees. Buttock rash present in 5/19 faded, but returned on arms, back. She saw VALARIE Flores who prescribed topicals, which have been helpful-as of Jan 2020 the active rash was confined to her buttocks. However, during the first part of 2020 the rash worsened despite the use of topicals.  The rash was quiet in early 2021.  She was not able to reduce prednisone to 7.5 mg/d as her hand arthritis worsened. The rash on her flanks became active, and she used topical steroids (triamcinolone 0.1%) with minimal success.  In Jul 2021 she reported  pain and swelling in her joints, mainly the hands. Rash was active on the RUE in Sep 2021. This improved, but she developed a rash on the right flank. In Feb 2020 there were few active manifestations of SLE. In 2023 she developed rash in the right flank, which partially responded to topical steroids. By May 2023 the rash was inactive. \par 2.  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  She has been treated with CellCept and remained on 2500 mg daily. She has had low-grade proteinuria. A 24 hour urine was to be collected in November 2016.  I was always concerned she had smoldering LN activity.  In the spring 2017 she stopped the CellCept on her own and then developed a major flare with an increase in creatinine to 2.5 with a urinary Pr/Cr of 5.  She was put back on CellCept and prednisone 40 mg/d.  The biopsy from late September 2017 demonstrated a class IV-G (A and C) lesion with 44% of the glomeruli sclerosed.  In addition, she had tubular atrophy and interstitial fibrosis estimated at about 50% (Activity: 10/24; Chronicity: 9/12).  It was decided despite the chronicity to try pulse steroids. Her creatinine went up but came back down to the high 2's. It dropped further to the low 2's. She was not seen from Dec 2017 until early Mar 2018.  She was doing relatively well clinically.  Lab tests were in order however. With a slight increase in furosemide, the edema improved. The creatinine has come down, albeit not normal. Given her chronic serologic activity and proteinuria, a repeat biopsy is in order.  She had a lot of chronicity on her last biopsy. A repeat biopsy was discussed to determine whether activity persists and whether a new therapeutic approach should be taken. Based on labs there seems to be a mixed picture, and without a kidney biopsy the activity of her kidney cannot be discerned, although she will no doubt have a lot of damage. The biopsy, which was performed in the spring 2019, failed to show activity; however, there was damage.  Therefore, I can't justify changing therapies. \par 3.  Lower extremity edema:  she had an ECHO and was told it was OK.  No back pain.  The edema came and went, but it has been diuretic responsive.  Her regimen of furosemide alternating with hydrochlorothiazide was switched to torsemide. She then went off all diuretics in early November 2017. The edema returned in the spring of 2019 perhaps because of an increase in prednisone. Pt could not tolerate furosemide 60 mg, so went down to 40 mg/d. However, on this dose she had edema, and it was suggested that she raise the dose. The edema disappeared when she took a higher dose of her furosemide. It got much better when she stopped the amlodipine. \par 4.  Chronic CellCept use:  tolerating without GI symptoms.\par 5.  Chronic Plaquenil use: no ocular symptoms\par 6.  Vitamin D deficiency\par 7.  Ocular migraine:  episode in late summer 2015\par 8.  URI\par 9.  Knee pain\par 10.  Angioedema:  episodes in the fall 2015 involving the lips and eyes prompted an evaluation of allergy.  No allergen was identified.  \par 12.  Dysesthesias in the legs: etiology unclear, but this symptom began in early 2016.\par 13.  Muscle cramps: she feels is related to diuretic use.  She cut down by using HCTZ, but this has not been an effective diuretic. \par 14.  Dry eyes: using topicals\par 15.  Hypertension:  remains elevated.  She was seen by GYN in the late spring 2017, her BP was 150/100 as it was on her June 28, 2017 visit. Additional therapy was warranted. Into 2019 her BP remained high. During her first visit of 2020 it was high again-though she had run out of Bystolic. She has noted feeling pre-syncopal at home when arising from a seated position.  With a low BP recorded on Jun 14, 2021, perhaps she is relatively hypotensive at home.  \par 16.  Shingles:  outbreak on the right hemithorax end of October 2017 responsive to Valtrex. \par 17.  Hyperkalemia: she had high potassium's, but it was unclear if they were a result of difficulty drawing blood or from nephritis and/or valsartan.  The valsartan was stopped.  She started another what sounds like ARB (probably eprosartan).\par 18.  GERD: evaluated by CHEL Flores, including an endoscopy.  A variety of drugs failed.  She was referred for a motility evaluation.  \par 19.  Gout:  episode of podagra left foot in August 2018.  She received prednisone with initial relief; however, a recurrence required another course.  No hx of kidney stones, tophi. She then experienced a similar episode in the right great toe necessitating more steroids.  Uloric was prescribed but not taken. In Sept 2019 she presented with 1 month of left 1st MTP pain and swelling. She decided to take Uloric, but in early 2020 stopped. It was restarted, but it may be that she needs a higher dose.  In late Apr 2021 she developed podagra (right great toe) that was unresponsive to increasing prednisone to 15 mg/d. This has lingered far longer than expected and requires 20-40 mg/d of prednisone. \par 20.  Rash: onset of rash left buttock in Oct 2018; etiology unclear but did not seem like shingles.  This rash returned in the spring 2019.  She used topical steroids. \par 21.  Headache: HA relieved with Tylenol during the early part of March 2019.  BPs have not been high enough to explain the HA. \par 22.  Anemia: the pattern appeared to be iron deficiency.  In 2023 she mentioned heavy periods were responsible.\par 23.  Jaw pain: onset of bilateral jaw pain in Jan 2020 for which she attained some relief by increasing the prednisone to 20 mg/d for two days.  Whether it was truly TMJ or perhaps parotid is unclear. It reproducibly improved on steroids. \par 24.  Low back pain: onset in the late spring 2020 of low back pain without trauma or radiation. \par 25.  Muscle cramps: episode in mid-Oct 2020 of diffuse muscle cramps, the cause of which was not known. Perhaps related to diuretics. The cramps were quite bothersome during 2022.\par 26. Paresthesias:  affecting fingertips and toes reported in early 2021. Noted at night. No color changes.  \par 27. DVT: a below the knee DVT in the right leg.  With positive aPL Ab, I decided to treat with warfarin.  For how long is another question. \par 28.  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 Recurrences treated with Valtrex. \par 27.  [place]'s pain: pain at insertion end of Oct 2021.  I suppose this could be gout. It prevents her from walking and generally subsides in one week. \par 28.  Rash: She reported in Aug 2022 solitary pruritic lesions on her lower back that dry up with steroid cream. \par 29.  LGSIL: scheduled for procedure May 26, 2023. Issue raised about anticoagulation.  I think it sufficient to stop warfarin three days before the procedure and resume the night of the procedure.\par \par Meds\par warfarin 10 mg/d\par Uloric 40 mg/d  \par furosemide 40 mg/d  \par prednisone 10 mg/d  \par Wegovy\par CellCept 2000 mg/d in divided doses\par irbesartan 300 mg \par Bystolic 10 mg/d\par Plaquenil 200 mg/d weekdays and 400 mg/d weekends\par Vit D 5000 IU/d\par Valtrex prn\par Ferrex 150 mg/d\par Feraheme spring 2023\par \par Vaccines\par PNVX 3/24/2008\par PNVX 1/7/2013 ([id number]; 30Jan2015)\par PNVX 23  2/14/19  ([id number]; 8/2/20)\par Prevnar 13 valent 2/3/2015 ([id number]; exp 3/16)\par Quadrivalent fluzone  9/29/2015 ( AA; exp 6/2016)\par Flu Quadrivalent 09/27/2021 ([place] [id number]; exp 6/30/2022)\par Shingrix 8/15/22  ([id number], exp 3/25/23; [id number]; exp 3/25/23)\par \par

## 2023-05-08 NOTE — PHYSICAL EXAM
[General Appearance - Alert] : alert [General Appearance - In No Acute Distress] : in no acute distress [General Appearance - Well Nourished] : well nourished [General Appearance - Well Developed] : well developed [General Appearance - Well-Appearing] : healthy appearing [Sclera] : the sclera and conjunctiva were normal [Extraocular Movements] : extraocular movements were intact [Strabismus] : no strabismus was seen [Outer Ear] : the ears and nose were normal in appearance [Examination Of The Oral Cavity] : the lips and gums were normal [Nasal Cavity] : the nasal mucosa and septum were normal [Oropharynx] : the oropharynx was normal [Neck Appearance] : the appearance of the neck was normal [Neck Cervical Mass (___cm)] : no neck mass was observed [Jugular Venous Distention Increased] : there was no jugular-venous distention [Thyroid Diffuse Enlargement] : the thyroid was not enlarged [Respiration, Rhythm And Depth] : normal respiratory rhythm and effort [Exaggerated Use Of Accessory Muscles For Inspiration] : no accessory muscle use [Auscultation Breath Sounds / Voice Sounds] : lungs were clear to auscultation bilaterally [Heart Rate And Rhythm] : heart rate was normal and rhythm regular [Heart Sounds] : normal S1 and S2 [Heart Sounds Gallop] : no gallops [Murmurs] : no murmurs [Heart Sounds Pericardial Friction Rub] : no pericardial rub [Veins - Varicosity Changes] : there were no varicosital changes [Bowel Sounds] : normal bowel sounds [Abdomen Soft] : soft [Abdomen Tenderness] : non-tender [] : no hepato-splenomegaly [Abdomen Mass (___ Cm)] : no abdominal mass palpated [Cervical Lymph Nodes Enlarged Posterior Bilaterally] : posterior cervical [Cervical Lymph Nodes Enlarged Anterior Bilaterally] : anterior cervical [Supraclavicular Lymph Nodes Enlarged Bilaterally] : supraclavicular [No CVA Tenderness] : no ~M costovertebral angle tenderness [No Spinal Tenderness] : no spinal tenderness [Abnormal Walk] : normal gait [Nail Clubbing] : no clubbing  or cyanosis of the fingernails [Motor Tone] : muscle strength and tone were normal [Skin Turgor] : normal skin turgor [Sensation] : the sensory exam was normal to light touch and pinprick [Motor Exam] : the motor exam was normal [Oriented To Time, Place, And Person] : oriented to person, place, and time [Impaired Insight] : insight and judgment were intact [Affect] : the affect was normal [Mood] : the mood was normal [FreeTextEntry1] : scattered hyperpigmentation

## 2023-05-08 NOTE — HISTORY OF PRESENT ILLNESS
[N/A] : N/A [Denies] : Denies [No] : No [Yes] : Yes [Declined] : Declined [Start Time: ___] : Medication Start Time: [unfilled] [End Time: ___] : Medication End Time: [unfilled] [IV discontinued. Intact. No signs or symptoms of IV complications noted. Time: ___] : IV discontinued. Intact. No signs or symptoms of IV complications noted. Time: [unfilled] [Patient  instructed to seek medical attention with signs and symptoms of adverse effects] : Patient  instructed to seek medical attention with signs and symptoms of adverse effects [Patient left unit in no acute distress] : Patient left unit in no acute distress [Medications administered as ordered and tolerated well.] : Medications administered as ordered and tolerated well. [de-identified] : \par FERAHEME 510 MG [de-identified] : Patient presents for 2:2 Feraheme infusion. Patient reports to be doing well, denies any AEs noted at last infusion. Patient reports that she feels less fatigue than before. Other than that, no complaints or symptoms verbalized. Patient denies of recent infection or abx use. Patient infused for 30 minutes, and observed for additional 30 minutes as per protocol. Patient left unit ambulatory in NAD

## 2023-05-08 NOTE — ASSESSMENT
[FreeTextEntry1] : 1.  SLE:  consisting of arthritis, nasal ulcers, Raynauds, alopecia, rash, and nephritis.  Rash was present during late 2014 and 2015, although during the late summer 2015, it wasn't particularly active. However, the rash was quite active in the spring, summer, and into the fall 2016 despite her medical regimen. It was  most prominent on the left arm and back.  Into 2017 the rash quieted down with the use of topical steroids. In 2018 it was intermittently active, but in late 2018/early 2019 the rash was quite active on the face, back, and hands.  She received an injection and topical therapy by her dermatologist.  The rash has remained active on her arms and thighs.  In addition, she has developed red spots (some tender) on the palmar aspects of her fingers. Her rash on the arms started to scale/improve. Arthralgias more prominent in summer, which is what is happening in summer 2019. Fingers, elbows and knees. Buttock rash present in 5/19 faded, but returned on arms, back. She saw VALARIE Flores who prescribed topicals, which have been helpful-as of Jan 2020 the active rash was confined to her buttocks. However, during the first part of 2020 the rash worsened despite the use of topicals.  The rash was quiet in early 2021.  She was not able to reduce prednisone to 7.5 mg/d as her hand arthritis worsened. The rash on her flanks became active, and she used topical steroids (triamcinolone 0.1%) with minimal success.  In Jul 2021 she reported  pain and swelling in her joints, mainly the hands. Rash was active on the RUE in Sep 2021. This improved, but she developed a rash on the right flank. In Feb 2020 there were few active manifestations of SLE. In 2023 she developed rash in the right flank, which partially responded to topical steroids. By May 2023 the rash was inactive. \par 2.  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  She has been treated with CellCept and remained on 2500 mg daily. She has had low-grade proteinuria. A 24 hour urine was to be collected in November 2016.  I was always concerned she had smoldering LN activity.  In the spring 2017 she stopped the CellCept on her own and then developed a major flare with an increase in creatinine to 2.5 with a urinary Pr/Cr of 5.  She was put back on CellCept and prednisone 40 mg/d.  The biopsy from late September 2017 demonstrated a class IV-G (A and C) lesion with 44% of the glomeruli sclerosed.  In addition, she had tubular atrophy and interstitial fibrosis estimated at about 50% (Activity: 10/24; Chronicity: 9/12).  It was decided despite the chronicity to try pulse steroids. Her creatinine went up but came back down to the high 2's. It dropped further to the low 2's. She was not seen from Dec 2017 until early Mar 2018.  She was doing relatively well clinically.  Lab tests were in order however. With a slight increase in furosemide, the edema improved. The creatinine has come down, albeit not normal. Given her chronic serologic activity and proteinuria, a repeat biopsy is in order.  She had a lot of chronicity on her last biopsy. A repeat biopsy was discussed to determine whether activity persists and whether a new therapeutic approach should be taken. Based on labs there seems to be a mixed picture, and without a kidney biopsy the activity of her kidney cannot be discerned, although she will no doubt have a lot of damage. The biopsy, which was performed in the spring 2019, failed to show activity; however, there was damage.  Therefore, I can't justify changing therapies. \par 3.  Lower extremity edema:  she had an ECHO and was told it was OK.  No back pain.  The edema came and went, but it has been diuretic responsive.  Her regimen of furosemide alternating with hydrochlorothiazide was switched to torsemide. She then went off all diuretics in early November 2017. The edema returned in the spring of 2019 perhaps because of an increase in prednisone. Pt could not tolerate furosemide 60 mg, so went down to 40 mg/d. However, on this dose she had edema, and it was suggested that she raise the dose. The edema disappeared when she took a higher dose of her furosemide. It got much better when she stopped the amlodipine. \par 4.  Chronic CellCept use:  tolerating without GI symptoms.\par 5.  Chronic Plaquenil use: no ocular symptoms\par 6.  Vitamin D deficiency\par 7.  Ocular migraine:  episode in late summer 2015\par 8.  URI\par 9.  Knee pain\par 10.  Angioedema:  episodes in the fall 2015 involving the lips and eyes prompted an evaluation of allergy.  No allergen was identified.  \par 12.  Dysesthesias in the legs: etiology unclear, but this symptom began in early 2016.\par 13.  Muscle cramps: she feels is related to diuretic use.  She cut down by using HCTZ, but this has not been an effective diuretic. \par 14.  Dry eyes: using topicals\par 15.  Hypertension:  remains elevated.  She was seen by GYN in the late spring 2017, her BP was 150/100 as it was on her June 28, 2017 visit. Additional therapy was warranted. Into 2019 her BP remained high. During her first visit of 2020 it was high again-though she had run out of Bystolic. She has noted feeling pre-syncopal at home when arising from a seated position.  With a low BP recorded on Jun 14, 2021, perhaps she is relatively hypotensive at home.  \par 16.  Shingles:  outbreak on the right hemithorax end of October 2017 responsive to Valtrex. \par 17.  Hyperkalemia: she had high potassium's, but it was unclear if they were a result of difficulty drawing blood or from nephritis and/or valsartan.  The valsartan was stopped.  She started another what sounds like ARB (probably eprosartan).\par 18.  GERD: evaluated by CHEL Flores, including an endoscopy.  A variety of drugs failed.  She was referred for a motility evaluation.  \par 19.  Gout:  episode of podagra left foot in August 2018.  She received prednisone with initial relief; however, a recurrence required another course.  No hx of kidney stones, tophi. She then experienced a similar episode in the right great toe necessitating more steroids.  Uloric was prescribed but not taken. In Sept 2019 she presented with 1 month of left 1st MTP pain and swelling. She decided to take Uloric, but in early 2020 stopped. It was restarted, but it may be that she needs a higher dose.  In late Apr 2021 she developed podagra (right great toe) that was unresponsive to increasing prednisone to 15 mg/d. This has lingered far longer than expected and requires 20-40 mg/d of prednisone. \par 20.  Rash: onset of rash left buttock in Oct 2018; etiology unclear but did not seem like shingles.  This rash returned in the spring 2019.  She used topical steroids. \par 21.  Headache: HA relieved with Tylenol during the early part of March 2019.  BPs have not been high enough to explain the HA. \par 22.  Anemia: the pattern appeared to be iron deficiency.  In 2023 she mentioned heavy periods were responsible.\par 23.  Jaw pain: onset of bilateral jaw pain in Jan 2020 for which she attained some relief by increasing the prednisone to 20 mg/d for two days.  Whether it was truly TMJ or perhaps parotid is unclear. It reproducibly improved on steroids. \par 24.  Low back pain: onset in the late spring 2020 of low back pain without trauma or radiation. \par 25.  Muscle cramps: episode in mid-Oct 2020 of diffuse muscle cramps, the cause of which was not known. Perhaps related to diuretics. The cramps were quite bothersome during 2022.\par 26. Paresthesias:  affecting fingertips and toes reported in early 2021. Noted at night. No color changes.  \par 27. DVT: a below the knee DVT in the right leg.  With positive aPL Ab, I decided to treat with warfarin.  For how long is another question. \par 28.  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 Recurrences treated with Valtrex. \par 27.  [place]'s pain: pain at insertion end of Oct 2021.  I suppose this could be gout. It prevents her from walking and generally subsides in one week. \par 28.  Rash: She reported in Aug 2022 solitary pruritic lesions on her lower back that dry up with steroid cream. \par 29.  LGSIL: scheduled for procedure May 26, 2023. Issue raised about anticoagulation.  I think it sufficient to stop warfarin three days before the procedure and resume the night of the procedure.\par \par Plan:\par 1.  Lab tests\par 2.  Return 6-8 weeks\par 3.  Reduce MMF to 1 g 2xd\par 4.  Continue q 1-2 weekly INR checks\par 5.  Try to reduce prednisone to 7.5 mg/d \par 6.  Calcium 600 mg 2xd\par 7.  Consider increasing vit D\par 8.  ? SGLT2I\par 9.  High risk medications used in the treatment of rheumatic diseases include steroids, disease modifying agents, immunosuppressive therapies, antimalarials, biologics, and chemotherapy. Regardless of which drug or class of drug, the potential toxicities of these therapies mandate close monitoring in the form of a history, physical, and laboratory tests.\par 10.  Systemic Lupus Erythematosus, known as lupus, is a chronic autoimmune disease that can affect any organ in the body posing threats to proper organ function and even to life. Therefore, close surveillance of all bodily functions is required, including but not limited to central and peripheral nervous system, ocular and auditory systems, cardiopulmonary function, kidney function, mucocutaneous and musculoskeletal systems as well as constitutional manifestations. Surveillance consists of history, physical, and laboratory tests. Treatment varies, but most of the drugs used are high risk and therefore also require close monitoring in the form of blood and urine tests.\par 11.  Anticoagulation for upcoming procedures: I think sufficient to stop warfarin three days prior to procedure and resume the night of. \par \par   \par \par \par

## 2023-05-16 ENCOUNTER — OUTPATIENT (OUTPATIENT)
Dept: OUTPATIENT SERVICES | Facility: HOSPITAL | Age: 53
LOS: 1 days | End: 2023-05-16
Payer: COMMERCIAL

## 2023-05-16 VITALS
HEART RATE: 81 BPM | OXYGEN SATURATION: 100 % | WEIGHT: 291.89 LBS | SYSTOLIC BLOOD PRESSURE: 107 MMHG | TEMPERATURE: 99 F | DIASTOLIC BLOOD PRESSURE: 56 MMHG | HEIGHT: 68 IN | RESPIRATION RATE: 16 BRPM

## 2023-05-16 DIAGNOSIS — I82.409 ACUTE EMBOLISM AND THROMBOSIS OF UNSPECIFIED DEEP VEINS OF UNSPECIFIED LOWER EXTREMITY: ICD-10-CM

## 2023-05-16 DIAGNOSIS — N87.0 MILD CERVICAL DYSPLASIA: ICD-10-CM

## 2023-05-16 DIAGNOSIS — M32.14 GLOMERULAR DISEASE IN SYSTEMIC LUPUS ERYTHEMATOSUS: ICD-10-CM

## 2023-05-16 DIAGNOSIS — Z98.890 OTHER SPECIFIED POSTPROCEDURAL STATES: Chronic | ICD-10-CM

## 2023-05-16 DIAGNOSIS — D36.9 BENIGN NEOPLASM, UNSPECIFIED SITE: Chronic | ICD-10-CM

## 2023-05-16 DIAGNOSIS — I10 ESSENTIAL (PRIMARY) HYPERTENSION: ICD-10-CM

## 2023-05-16 DIAGNOSIS — Z01.818 ENCOUNTER FOR OTHER PREPROCEDURAL EXAMINATION: ICD-10-CM

## 2023-05-16 DIAGNOSIS — Z87.42 PERSONAL HISTORY OF OTHER DISEASES OF THE FEMALE GENITAL TRACT: ICD-10-CM

## 2023-05-16 LAB
ABO RH CONFIRMATION: SIGNIFICANT CHANGE UP
ALBUMIN SERPL ELPH-MCNC: 3.4 G/DL — SIGNIFICANT CHANGE UP (ref 3.3–5)
ALP SERPL-CCNC: 85 U/L — SIGNIFICANT CHANGE UP (ref 40–120)
ALT FLD-CCNC: 33 U/L — SIGNIFICANT CHANGE UP (ref 12–78)
ANION GAP SERPL CALC-SCNC: 4 MMOL/L — LOW (ref 5–17)
ANISOCYTOSIS BLD QL: SIGNIFICANT CHANGE UP
AST SERPL-CCNC: 20 U/L — SIGNIFICANT CHANGE UP (ref 15–37)
BASOPHILS # BLD AUTO: 0.06 K/UL — SIGNIFICANT CHANGE UP (ref 0–0.2)
BASOPHILS NFR BLD AUTO: 0.5 % — SIGNIFICANT CHANGE UP (ref 0–2)
BILIRUB SERPL-MCNC: 0.3 MG/DL — SIGNIFICANT CHANGE UP (ref 0.2–1.2)
BLD GP AB SCN SERPL QL: SIGNIFICANT CHANGE UP
BUN SERPL-MCNC: 29 MG/DL — HIGH (ref 7–23)
CALCIUM SERPL-MCNC: 8.7 MG/DL — SIGNIFICANT CHANGE UP (ref 8.5–10.1)
CHLORIDE SERPL-SCNC: 118 MMOL/L — HIGH (ref 96–108)
CO2 SERPL-SCNC: 20 MMOL/L — LOW (ref 22–31)
CREAT SERPL-MCNC: 1.78 MG/DL — HIGH (ref 0.5–1.3)
EGFR: 34 ML/MIN/1.73M2 — LOW
EOSINOPHIL # BLD AUTO: 0.13 K/UL — SIGNIFICANT CHANGE UP (ref 0–0.5)
EOSINOPHIL NFR BLD AUTO: 1 % — SIGNIFICANT CHANGE UP (ref 0–6)
GLUCOSE SERPL-MCNC: 72 MG/DL — SIGNIFICANT CHANGE UP (ref 70–99)
HCT VFR BLD CALC: 35.1 % — SIGNIFICANT CHANGE UP (ref 34.5–45)
HGB BLD-MCNC: 11.1 G/DL — LOW (ref 11.5–15.5)
IMM GRANULOCYTES NFR BLD AUTO: 0.6 % — SIGNIFICANT CHANGE UP (ref 0–0.9)
LYMPHOCYTES # BLD AUTO: 1.34 K/UL — SIGNIFICANT CHANGE UP (ref 1–3.3)
LYMPHOCYTES # BLD AUTO: 10.6 % — LOW (ref 13–44)
MACROCYTES BLD QL: SLIGHT — SIGNIFICANT CHANGE UP
MAGNESIUM SERPL-MCNC: 1.5 MG/DL — LOW (ref 1.6–2.6)
MANUAL SMEAR VERIFICATION: SIGNIFICANT CHANGE UP
MCHC RBC-ENTMCNC: 24.5 PG — LOW (ref 27–34)
MCHC RBC-ENTMCNC: 31.6 GM/DL — LOW (ref 32–36)
MCV RBC AUTO: 77.5 FL — LOW (ref 80–100)
MICROCYTES BLD QL: SIGNIFICANT CHANGE UP
MONOCYTES # BLD AUTO: 1.25 K/UL — HIGH (ref 0–0.9)
MONOCYTES NFR BLD AUTO: 9.9 % — SIGNIFICANT CHANGE UP (ref 2–14)
NEUTROPHILS # BLD AUTO: 9.74 K/UL — HIGH (ref 1.8–7.4)
NEUTROPHILS NFR BLD AUTO: 77.4 % — HIGH (ref 43–77)
PHOSPHATE SERPL-MCNC: 2.6 MG/DL — SIGNIFICANT CHANGE UP (ref 2.5–4.5)
PLAT MORPH BLD: NORMAL — SIGNIFICANT CHANGE UP
PLATELET # BLD AUTO: 174 K/UL — SIGNIFICANT CHANGE UP (ref 150–400)
POIKILOCYTOSIS BLD QL AUTO: SLIGHT — SIGNIFICANT CHANGE UP
POLYCHROMASIA BLD QL SMEAR: SLIGHT — SIGNIFICANT CHANGE UP
POTASSIUM SERPL-MCNC: 4.3 MMOL/L — SIGNIFICANT CHANGE UP (ref 3.5–5.3)
POTASSIUM SERPL-SCNC: 4.3 MMOL/L — SIGNIFICANT CHANGE UP (ref 3.5–5.3)
PROT SERPL-MCNC: 7.1 GM/DL — SIGNIFICANT CHANGE UP (ref 6–8.3)
RBC # BLD: 4.53 M/UL — SIGNIFICANT CHANGE UP (ref 3.8–5.2)
RBC # FLD: 25.2 % — HIGH (ref 10.3–14.5)
RBC BLD AUTO: ABNORMAL
SCHISTOCYTES BLD QL AUTO: SLIGHT — SIGNIFICANT CHANGE UP
SODIUM SERPL-SCNC: 142 MMOL/L — SIGNIFICANT CHANGE UP (ref 135–145)
TARGETS BLD QL SMEAR: SLIGHT — SIGNIFICANT CHANGE UP
WBC # BLD: 12.59 K/UL — HIGH (ref 3.8–10.5)
WBC # FLD AUTO: 12.59 K/UL — HIGH (ref 3.8–10.5)

## 2023-05-16 PROCEDURE — 86850 RBC ANTIBODY SCREEN: CPT

## 2023-05-16 PROCEDURE — 36415 COLL VENOUS BLD VENIPUNCTURE: CPT

## 2023-05-16 PROCEDURE — 86901 BLOOD TYPING SEROLOGIC RH(D): CPT

## 2023-05-16 PROCEDURE — 85025 COMPLETE CBC W/AUTO DIFF WBC: CPT

## 2023-05-16 PROCEDURE — 99214 OFFICE O/P EST MOD 30 MIN: CPT | Mod: 25

## 2023-05-16 PROCEDURE — 86900 BLOOD TYPING SEROLOGIC ABO: CPT

## 2023-05-16 PROCEDURE — 84100 ASSAY OF PHOSPHORUS: CPT

## 2023-05-16 PROCEDURE — 93010 ELECTROCARDIOGRAM REPORT: CPT

## 2023-05-16 PROCEDURE — 80053 COMPREHEN METABOLIC PANEL: CPT

## 2023-05-16 PROCEDURE — 93005 ELECTROCARDIOGRAM TRACING: CPT

## 2023-05-16 PROCEDURE — 83735 ASSAY OF MAGNESIUM: CPT

## 2023-05-16 NOTE — H&P PST ADULT - ASSESSMENT
53 y/o female with hx of DVt ( currently on coumadin) , Lupus nephritis, HTN, Gout presents to [facility] for scheduled dilation and curettage, cone biopsy on 5/26/23. Patient with abnormal pap smear over the last few years and has completed leep 1/2023 with inconclusive results. Patient advised for repeat procedure.

## 2023-05-16 NOTE — H&P PST ADULT - NSICDXPASTMEDICALHX_GEN_ALL_CORE_FT
PAST MEDICAL HISTORY:  DVT, lower extremity left leg 2012, resolved    Gout     Hypertension     Iron deficiency anemia     Lupus nephritis     Mild dyskaryosis present on cervical smear     Obesity     SLE (systemic lupus erythematosus)

## 2023-05-16 NOTE — H&P PST ADULT - PROBLEM SELECTOR PLAN 3
On  the DOS  with sip of water take cardiac meds - irbesartan and nebivolol     Patient verbalized understanding.

## 2023-05-16 NOTE — H&P PST ADULT - HISTORY OF PRESENT ILLNESS
51 y/o female with hx of DVt ( currently on coumadin) , Lupus nephritis, HTN, Gout presents to [facility] for scheduled dilation and curettage, cone biopsy on 5/26/23. Patient with abnormal pap smear over the last few years and has completed leep 1/2023 with inconclusive results. Patient advised for repeat procedure.  53 y/o female with hx of DVT ( currently on coumadin) , Lupus nephritis, HTN, Gout presents to [facility] for scheduled dilation and curettage, cone biopsy on 5/26/23. Patient with abnormal pap smear over the last few years and has completed leep 1/2023 with inconclusive results. Patient advised for repeat procedure.

## 2023-05-17 DIAGNOSIS — Z01.818 ENCOUNTER FOR OTHER PREPROCEDURAL EXAMINATION: ICD-10-CM

## 2023-05-17 DIAGNOSIS — N87.0 MILD CERVICAL DYSPLASIA: ICD-10-CM

## 2023-05-26 ENCOUNTER — TRANSCRIPTION ENCOUNTER (OUTPATIENT)
Age: 53
End: 2023-05-26

## 2023-05-26 ENCOUNTER — RESULT REVIEW (OUTPATIENT)
Age: 53
End: 2023-05-26

## 2023-05-26 ENCOUNTER — OUTPATIENT (OUTPATIENT)
Dept: INPATIENT UNIT | Facility: HOSPITAL | Age: 53
LOS: 1 days | Discharge: HOME | End: 2023-05-26
Payer: COMMERCIAL

## 2023-05-26 VITALS
RESPIRATION RATE: 14 BRPM | SYSTOLIC BLOOD PRESSURE: 117 MMHG | TEMPERATURE: 98 F | HEIGHT: 68 IN | DIASTOLIC BLOOD PRESSURE: 78 MMHG | WEIGHT: 285.94 LBS | HEART RATE: 68 BPM | OXYGEN SATURATION: 100 %

## 2023-05-26 VITALS
DIASTOLIC BLOOD PRESSURE: 90 MMHG | RESPIRATION RATE: 16 BRPM | SYSTOLIC BLOOD PRESSURE: 129 MMHG | OXYGEN SATURATION: 100 % | TEMPERATURE: 97 F | HEART RATE: 80 BPM

## 2023-05-26 DIAGNOSIS — N87.0 MILD CERVICAL DYSPLASIA: ICD-10-CM

## 2023-05-26 DIAGNOSIS — D36.9 BENIGN NEOPLASM, UNSPECIFIED SITE: Chronic | ICD-10-CM

## 2023-05-26 DIAGNOSIS — Z98.890 OTHER SPECIFIED POSTPROCEDURAL STATES: Chronic | ICD-10-CM

## 2023-05-26 LAB
APTT BLD: 32.7 SEC — SIGNIFICANT CHANGE UP (ref 27.5–35.5)
HCG UR QL: NEGATIVE — SIGNIFICANT CHANGE UP
INR BLD: 1.32 RATIO — HIGH (ref 0.88–1.16)
PROTHROM AB SERPL-ACNC: 15.4 SEC — HIGH (ref 10.5–13.4)

## 2023-05-26 PROCEDURE — C1889: CPT

## 2023-05-26 PROCEDURE — 85730 THROMBOPLASTIN TIME PARTIAL: CPT

## 2023-05-26 PROCEDURE — 85610 PROTHROMBIN TIME: CPT

## 2023-05-26 PROCEDURE — 36415 COLL VENOUS BLD VENIPUNCTURE: CPT

## 2023-05-26 PROCEDURE — 88305 TISSUE EXAM BY PATHOLOGIST: CPT | Mod: 26

## 2023-05-26 PROCEDURE — 88307 TISSUE EXAM BY PATHOLOGIST: CPT | Mod: 26

## 2023-05-26 PROCEDURE — 57522 CONIZATION OF CERVIX: CPT

## 2023-05-26 PROCEDURE — 88307 TISSUE EXAM BY PATHOLOGIST: CPT

## 2023-05-26 PROCEDURE — 57505 ENDOCERVICAL CURETTAGE: CPT

## 2023-05-26 PROCEDURE — 81025 URINE PREGNANCY TEST: CPT

## 2023-05-26 PROCEDURE — 88305 TISSUE EXAM BY PATHOLOGIST: CPT

## 2023-05-26 RX ORDER — MYCOPHENOLATE MOFETIL 250 MG/1
4 CAPSULE ORAL
Refills: 0 | DISCHARGE

## 2023-05-26 RX ORDER — NEBIVOLOL HYDROCHLORIDE 5 MG/1
1 TABLET ORAL
Refills: 0 | DISCHARGE

## 2023-05-26 RX ORDER — FENTANYL CITRATE 50 UG/ML
50 INJECTION INTRAVENOUS
Refills: 0 | Status: DISCONTINUED | OUTPATIENT
Start: 2023-05-26 | End: 2023-05-26

## 2023-05-26 RX ORDER — SODIUM CHLORIDE 9 MG/ML
1000 INJECTION, SOLUTION INTRAVENOUS
Refills: 0 | Status: DISCONTINUED | OUTPATIENT
Start: 2023-05-26 | End: 2023-05-26

## 2023-05-26 RX ORDER — OXYCODONE HYDROCHLORIDE 5 MG/1
5 TABLET ORAL ONCE
Refills: 0 | Status: DISCONTINUED | OUTPATIENT
Start: 2023-05-26 | End: 2023-05-26

## 2023-05-26 RX ORDER — FEBUXOSTAT 40 MG/1
1 TABLET ORAL
Refills: 0 | DISCHARGE

## 2023-05-26 RX ORDER — ACETAMINOPHEN 500 MG
1000 TABLET ORAL ONCE
Refills: 0 | Status: COMPLETED | OUTPATIENT
Start: 2023-05-26 | End: 2023-05-26

## 2023-05-26 RX ORDER — HYDROXYCHLOROQUINE SULFATE 200 MG
1 TABLET ORAL
Refills: 0 | DISCHARGE

## 2023-05-26 RX ORDER — ONDANSETRON 8 MG/1
4 TABLET, FILM COATED ORAL ONCE
Refills: 0 | Status: DISCONTINUED | OUTPATIENT
Start: 2023-05-26 | End: 2023-05-26

## 2023-05-26 RX ADMIN — SODIUM CHLORIDE 125 MILLILITER(S): 9 INJECTION, SOLUTION INTRAVENOUS at 09:23

## 2023-05-26 RX ADMIN — Medication 1000 MILLIGRAM(S): at 10:00

## 2023-05-26 RX ADMIN — Medication 400 MILLIGRAM(S): at 09:15

## 2023-05-26 NOTE — BRIEF OPERATIVE NOTE - OPERATION/FINDINGS
Anteverted uterus, uterus sounded to 8 cm.   No rectovaginal nodularity noted.   No visible cervical lesions noted.

## 2023-05-26 NOTE — ASU DISCHARGE PLAN (ADULT/PEDIATRIC) - CARE PROVIDER_API CALL
Echo Kim)  Gynecologic Oncology; Obstetrics and Gynecology  [address] [place] [address]  Phone: [phone]  Fax: [phone]  Follow Up Time:

## 2023-05-26 NOTE — ASU DISCHARGE PLAN (ADULT/PEDIATRIC) - NS MD DC FALL RISK RISK
For information on Fall & Injury Prevention, visit: [email or web address] OR  [email or web address] OR  https://www.cdc.gov/steadi/patient.html

## 2023-05-26 NOTE — ASU DISCHARGE PLAN (ADULT/PEDIATRIC) - ASU DC SPECIAL INSTRUCTIONSFT
Follow-up with Dr. Kim in two weeks to review pathology report.     Please contact your provider for any pain uncontrolled by medication, excessive bleeding or Fever>100.4  Please take OTC Tylenol Extra Strenght as recommended for pain control. Follow-up with Dr. Kim in two weeks to review pathology report.     Please contact your provider for any pain uncontrolled by medication, excessive bleeding or Fever>100.4  Please take OTC Tylenol Extra Strenght as recommended for pain control.    May resume Coumadin tomorrow night as prescribed.

## 2023-05-26 NOTE — BRIEF OPERATIVE NOTE - NSICDXBRIEFPREOP_GEN_ALL_CORE_FT
PRE-OP DIAGNOSIS:  Abnormal uterine bleeding (AUB) 26-May-2023 08:28:58  Mary Gould  High grade squamous intraepithelial cervical dysplasia 26-May-2023 08:29:49  Mary Gould

## 2023-05-26 NOTE — ASU DISCHARGE PLAN (ADULT/PEDIATRIC) - PROCEDURE
US guided Dilation and Curettage, [facility] US guided Dilation and Curettage, [place] [facility]

## 2023-05-26 NOTE — BRIEF OPERATIVE NOTE - NSICDXBRIEFPROCEDURE_GEN_ALL_CORE_FT
PROCEDURES:  Dilation and curettage with biopsy 26-May-2023 08:27:46  Mary Gould  Cervical cold knife cone biopsy 26-May-2023 08:28:43  Mary Gould

## 2023-05-26 NOTE — BRIEF OPERATIVE NOTE - NSICDXBRIEFPOSTOP_GEN_ALL_CORE_FT
POST-OP DIAGNOSIS:  Abnormal uterine bleeding 26-May-2023 08:30:08  Mary Gould  High grade squamous intraepithelial cervical dysplasia 26-May-2023 08:30:20  Mary Gould

## 2023-05-27 RX ORDER — WARFARIN SODIUM 2.5 MG/1
1 TABLET ORAL
Qty: 0 | Refills: 0 | DISCHARGE
Start: 2023-05-27

## 2023-05-30 LAB — SURGICAL PATHOLOGY STUDY: SIGNIFICANT CHANGE UP

## 2023-05-31 DIAGNOSIS — I12.9 HYPERTENSIVE CHRONIC KIDNEY DISEASE WITH STAGE 1 THROUGH STAGE 4 CHRONIC KIDNEY DISEASE, OR UNSPECIFIED CHRONIC KIDNEY DISEASE: ICD-10-CM

## 2023-05-31 DIAGNOSIS — M10.9 GOUT, UNSPECIFIED: ICD-10-CM

## 2023-05-31 DIAGNOSIS — N72 INFLAMMATORY DISEASE OF CERVIX UTERI: ICD-10-CM

## 2023-05-31 DIAGNOSIS — Z79.01 LONG TERM (CURRENT) USE OF ANTICOAGULANTS: ICD-10-CM

## 2023-05-31 DIAGNOSIS — N84.0 POLYP OF CORPUS UTERI: ICD-10-CM

## 2023-05-31 DIAGNOSIS — E73.9 LACTOSE INTOLERANCE, UNSPECIFIED: ICD-10-CM

## 2023-05-31 DIAGNOSIS — M32.9 SYSTEMIC LUPUS ERYTHEMATOSUS, UNSPECIFIED: ICD-10-CM

## 2023-05-31 DIAGNOSIS — Z86.718 PERSONAL HISTORY OF OTHER VENOUS THROMBOSIS AND EMBOLISM: ICD-10-CM

## 2023-05-31 DIAGNOSIS — E66.01 MORBID (SEVERE) OBESITY DUE TO EXCESS CALORIES: ICD-10-CM

## 2023-05-31 DIAGNOSIS — M19.90 UNSPECIFIED OSTEOARTHRITIS, UNSPECIFIED SITE: ICD-10-CM

## 2023-05-31 DIAGNOSIS — N18.31 CHRONIC KIDNEY DISEASE, STAGE 3A: ICD-10-CM

## 2023-05-31 DIAGNOSIS — N87.9 DYSPLASIA OF CERVIX UTERI, UNSPECIFIED: ICD-10-CM

## 2023-06-07 ENCOUNTER — RX RENEWAL (OUTPATIENT)
Age: 53
End: 2023-06-07

## 2023-06-07 RX ORDER — IRON POLYSACCHARIDE COMPLEX 150 MG
150 CAPSULE ORAL
Qty: 90 | Refills: 3 | Status: ACTIVE | COMMUNITY
Start: 2023-06-07 | End: 1900-01-01

## 2023-06-14 ENCOUNTER — OUTPATIENT (OUTPATIENT)
Dept: OUTPATIENT SERVICES | Facility: HOSPITAL | Age: 53
LOS: 1 days | End: 2023-06-14
Payer: COMMERCIAL

## 2023-06-14 ENCOUNTER — APPOINTMENT (OUTPATIENT)
Dept: GYNECOLOGIC ONCOLOGY | Facility: CLINIC | Age: 53
End: 2023-06-14
Payer: COMMERCIAL

## 2023-06-14 VITALS
HEIGHT: 68 IN | OXYGEN SATURATION: 98 % | TEMPERATURE: 98 F | WEIGHT: 283.96 LBS | HEART RATE: 93 BPM | SYSTOLIC BLOOD PRESSURE: 140 MMHG | RESPIRATION RATE: 16 BRPM | DIASTOLIC BLOOD PRESSURE: 95 MMHG

## 2023-06-14 VITALS
DIASTOLIC BLOOD PRESSURE: 80 MMHG | TEMPERATURE: 98.2 F | WEIGHT: 284 LBS | BODY MASS INDEX: 43.04 KG/M2 | SYSTOLIC BLOOD PRESSURE: 114 MMHG | HEIGHT: 68 IN

## 2023-06-14 DIAGNOSIS — Z98.890 OTHER SPECIFIED POSTPROCEDURAL STATES: Chronic | ICD-10-CM

## 2023-06-14 DIAGNOSIS — R12 HEARTBURN: ICD-10-CM

## 2023-06-14 DIAGNOSIS — Z01.818 ENCOUNTER FOR OTHER PREPROCEDURAL EXAMINATION: ICD-10-CM

## 2023-06-14 DIAGNOSIS — Z12.11 ENCOUNTER FOR SCREENING FOR MALIGNANT NEOPLASM OF COLON: ICD-10-CM

## 2023-06-14 DIAGNOSIS — D36.9 BENIGN NEOPLASM, UNSPECIFIED SITE: Chronic | ICD-10-CM

## 2023-06-14 DIAGNOSIS — N18.9 CHRONIC KIDNEY DISEASE, UNSPECIFIED: ICD-10-CM

## 2023-06-14 DIAGNOSIS — E66.01 MORBID (SEVERE) OBESITY DUE TO EXCESS CALORIES: ICD-10-CM

## 2023-06-14 DIAGNOSIS — I82.409 ACUTE EMBOLISM AND THROMBOSIS OF UNSPECIFIED DEEP VEINS OF UNSPECIFIED LOWER EXTREMITY: ICD-10-CM

## 2023-06-14 LAB
ANION GAP SERPL CALC-SCNC: 14 MMOL/L — SIGNIFICANT CHANGE UP (ref 5–17)
BUN SERPL-MCNC: 32 MG/DL — HIGH (ref 7–23)
CALCIUM SERPL-MCNC: 9.4 MG/DL — SIGNIFICANT CHANGE UP (ref 8.4–10.5)
CHLORIDE SERPL-SCNC: 111 MMOL/L — HIGH (ref 96–108)
CO2 SERPL-SCNC: 17 MMOL/L — LOW (ref 22–31)
CREAT SERPL-MCNC: 1.87 MG/DL — HIGH (ref 0.5–1.3)
EGFR: 32 ML/MIN/1.73M2 — LOW
GLUCOSE SERPL-MCNC: 76 MG/DL — SIGNIFICANT CHANGE UP (ref 70–99)
HCT VFR BLD CALC: 37.7 % — SIGNIFICANT CHANGE UP (ref 34.5–45)
HGB BLD-MCNC: 12.2 G/DL — SIGNIFICANT CHANGE UP (ref 11.5–15.5)
MCHC RBC-ENTMCNC: 25.8 PG — LOW (ref 27–34)
MCHC RBC-ENTMCNC: 32.4 GM/DL — SIGNIFICANT CHANGE UP (ref 32–36)
MCV RBC AUTO: 79.7 FL — LOW (ref 80–100)
NRBC # BLD: 0 /100 WBCS — SIGNIFICANT CHANGE UP (ref 0–0)
PLATELET # BLD AUTO: 136 K/UL — LOW (ref 150–400)
POTASSIUM SERPL-MCNC: 4.2 MMOL/L — SIGNIFICANT CHANGE UP (ref 3.5–5.3)
POTASSIUM SERPL-SCNC: 4.2 MMOL/L — SIGNIFICANT CHANGE UP (ref 3.5–5.3)
RBC # BLD: 4.73 M/UL — SIGNIFICANT CHANGE UP (ref 3.8–5.2)
RBC # FLD: 22.5 % — HIGH (ref 10.3–14.5)
SODIUM SERPL-SCNC: 142 MMOL/L — SIGNIFICANT CHANGE UP (ref 135–145)
WBC # BLD: 11.27 K/UL — HIGH (ref 3.8–10.5)
WBC # FLD AUTO: 11.27 K/UL — HIGH (ref 3.8–10.5)

## 2023-06-14 PROCEDURE — 99214 OFFICE O/P EST MOD 30 MIN: CPT | Mod: 24

## 2023-06-14 PROCEDURE — 80048 BASIC METABOLIC PNL TOTAL CA: CPT

## 2023-06-14 PROCEDURE — 36415 COLL VENOUS BLD VENIPUNCTURE: CPT

## 2023-06-14 PROCEDURE — 85027 COMPLETE CBC AUTOMATED: CPT

## 2023-06-14 PROCEDURE — G0463: CPT

## 2023-06-14 RX ORDER — NEBIVOLOL HYDROCHLORIDE 5 MG/1
1 TABLET ORAL
Qty: 0 | Refills: 0 | DISCHARGE

## 2023-06-14 RX ORDER — MYCOPHENOLATE MOFETIL 250 MG/1
2 CAPSULE ORAL
Refills: 0 | DISCHARGE

## 2023-06-14 RX ORDER — MYCOPHENOLATE MOFETIL 250 MG/1
2 CAPSULE ORAL
Qty: 0 | Refills: 0 | DISCHARGE

## 2023-06-14 NOTE — H&P PST ADULT - PRIMARY CARE PROVIDER
Dr Kulwant Greco(PCP/Nephrologist) [phone] - last visit 5/2023 for M/E prior to cone biopsy at [facility]

## 2023-06-14 NOTE — H&P PST ADULT - NSANTHOSAYNRD_GEN_A_CORE
No. JIM screening performed.  STOP BANG Legend: 0-2 = LOW Risk; 3-4 = INTERMEDIATE Risk; 5-8 = HIGH Risk neck circumference 16 inches/No. JIM screening performed.  STOP BANG Legend: 0-2 = LOW Risk; 3-4 = INTERMEDIATE Risk; 5-8 = HIGH Risk

## 2023-06-14 NOTE — H&P PST ADULT - HISTORY OF PRESENT ILLNESS
52 year old female with PMH Lupus Nephritis, DVT (2012 - on coumadin), HTN, gout, obesity, abnormal pap smear s/p LEEP procedure (1/2023) and cone biopsy at [facility] (5/2023) with pending pathology results reports c/o nausea and vomiting after eating beef and pork that leaves a metallic bitter aftertaste for a few years. She denies any abdominal pain, fever or chills.  52 year old female with PMH Lupus Nephritis, DVT (2012 - on coumadin), HTN, gout, obesity (BMI 43.2 - on Wegovy weekly), abnormal pap smear (LGSIL) s/p LEEP procedure (1/2023) and cone biopsy at [facility] (5/2023) with pending pathology results reports c/o nausea and vomiting after eating beef and pork that leaves a metallic and bitter aftertaste for a few years. She denies any abdominal pain, fever or chills. Pt now presents to [facility] for scheduled EGD Colonoscopy with Dr. Dawson on 6/26/23 at the endoscopy suite.

## 2023-06-14 NOTE — H&P PST ADULT - OTHER CARE PROVIDERS
Dr. Eb Guerra (Rheumatologist ) [phone] - last visit 5/8/23 for F/U Dr. Eb Guerra (Rheumatologist ) [phone] - last visit 5/8/23 for F/U (in chart)

## 2023-06-14 NOTE — H&P PST ADULT - COMMENTS
pt states she forgot to take her BP medication this morning s/p LEEP procedure and Cone Biopsy 5/2023 - pending results

## 2023-06-14 NOTE — H&P PST ADULT - ASSESSMENT
DASI score: 7.99  DASI activity: Able to walk 2-3 blocks, ADLs, Grocery Shopping, 1 Flight of Stairs   Loose teeth or denture: denies

## 2023-06-14 NOTE — H&P PST ADULT - NSICDXPASTMEDICALHX_GEN_ALL_CORE_FT
PAST MEDICAL HISTORY:  Chronic anticoagulation     CKD (chronic kidney disease)     DVT, lower extremity left leg 2012, resolved    Encounter for screening for malignant neoplasm of colon     Gout     Heart burn     Hypertension     Iron deficiency anemia     LGSIL on Pap smear of cervix     Long-term use of immunosuppressant medication     Lupus nephritis     Obesity     Rash due to systemic lupus erythematosus (SLE)     Shingles     SLE (systemic lupus erythematosus)

## 2023-06-14 NOTE — H&P PST ADULT - PROBLEM SELECTOR PLAN 3
Pt advised to hold coumadin 5 days prior to surgery with last dose on 6/21/23 AM - plan to be discussed with Dr. Guerra (rheumatologist who prescribes coumadin) with teach back understanding  STAT INR DOS

## 2023-06-14 NOTE — H&P PST ADULT - NEGATIVE GASTROINTESTINAL SYMPTOMS
no diarrhea/no constipation/no change in bowel habits/no abdominal pain/no melena/no hematochezia/no jaundice

## 2023-06-14 NOTE — H&P PST ADULT - PROBLEM SELECTOR PLAN 1
Pt is scheduled for an EGD Colonoscopy with Dr. Dawson on 6/26/23 at the endoscopy suite  CBC, BMP [facility]  Urine HCG on admit

## 2023-06-22 NOTE — REASON FOR VISIT
[Post Op] : post op visit [de-identified] : 5/26/23 [de-identified] : [facility] biopsy, D&C for AUB & HGSIL at [facility] [de-identified] : The patient is healing remarkably well. She denies a change in appetite, or a change in weight. She is tolerating PO without nausea or vomiting. She denies VB/VD. She denies CP/SOB.

## 2023-06-22 NOTE — ASSESSMENT
[FreeTextEntry1] : The patient is healing well without complication. We discussed ongoing recuperation and reviewed final pathology results in detail - a copy was provided to the patient. Restrictions include no heavy lifting & pelvic rest up to 8 weeks from surgery. I am reassured that final path reflects mild dysplasia with only small endocervical gland involvement. Patient remains interested in pursuing hysterectomy for her PMB.\par \par I discussed at length with the patient the nature, purpose, risks, benefits, and alternatives of Robot assisted total laparoscopic hysterectomy with bilateral salpingo-oophorectomy.  The patient understands the risks to include (but not be limited to) bowel injury, bleeding (with the possible need for transfusion), bladder or ureteral injury, infections, deep venous thrombosis, and tami-operative death.  The patient also understands that her surgery may not be able to be performed robotically and that she may need a laparotomy.  She also understands the limitations of robotic surgery and the possibility of missing a surgical complication with need for subsequent re-exploration.  She asked numerous questions which were answered to her satisfaction.  She understands the need for a clear liquid diet the day before her procedure and agrees to comply with our instructions.  She also understands the rationale for a cystoscopy at the completion of the procedure and the potential risks of cystoscopy.\par \par The patient stated and expressed a good understanding of the above information. She agrees to proceed. Would recommend pt have hysterectomy done about 4-6 weeks from cold knife cone. Surgery to be done at . She understands that surgery would not be done at an urgent basis - it is not unreasonable to wait until end of summer to have surgery.\par \par Recently received IV Iron with her rheumatologist in May.

## 2023-06-22 NOTE — DISCUSSION/SUMMARY
[Doing Well] : is doing well [Excellent Pain Control] : has excellent pain control [No Sign of Infection] : is showing no signs of infection [Clean] : was clean [None] : had no drainage [Normal Skin] : normal appearance [Erythema] : was not erythematous [Seroma] : had no seroma [Ecchymosis] : was not ecchymotic [Firm] : soft [Tender] : nontender [Abnormal Bowel Sounds] : normal bowel sounds [Rebound] : no rebound tenderness [Guarding] : no guarding [Incisional Hernia] : no incisional hernia [Mass] : no palpable mass [Cervical Abnormality] : normal cervix [External Genitalia Abnormal] : normal external genitalia [Vaginal Exam Abnormal] : normal vaginal exam [de-identified] : Keira Hsu MA was present the entire time of gynecological exam.  [FreeTextEntry1] : OPERATIVE FINDINGS: pending op report\par \par PATHOLOGY:\par 1.  Cervical cone biopsy specimen:\par -   HIGH-GRADE SQUAMOUS INTRAEPITHELIAL LESION (MODERATE\par \par DYSPLASIA) WITH A SMALL FOCUS OF SUPERFICIAL ENDOCERVICAL\par GLAND INVOLVEMENT.\par -   Endocervical margin is free of NAVEED.\par -   Background of moderate chronic cervicitis with reactive squamous\par metaplasia.\par \par 2.  Endocervical curettings ("post cone"):\par -   Negative for NAVEED.\par -   Fragments of mildly inflamed endocervical tissue with reactive\par squamous metaplasia.\par -   Fragments of proliferative pattern endometrium with a few\par fragments suggestive of\par polyp.  See below.\par \par 3.  Endometrial curettings:\par -   Fragments of endometrial polyp.\par -   Background of proliferative endometrium.

## 2023-06-22 NOTE — END OF VISIT
[FreeTextEntry3] : Written by Keira Hsu, acting as a scribe for Dr. Echo Kim.\par This note accurately reflects the work and decisions made by me.

## 2023-06-25 NOTE — PRE PROCEDURE NOTE - PRE PROCEDURE EVALUATION
Attending Physician:        Aron Dawson MD                    Procedure:    Indication for Procedure: dysphagia and screening  ________________________________________________________  PAST MEDICAL & SURGICAL HISTORY:  Hypertension      Obesity      SLE (systemic lupus erythematosus)      Lupus nephritis      Iron deficiency anemia      Gout      DVT, lower extremity  left leg , resolved      CKD (chronic kidney disease)      Shingles      Chronic anticoagulation      Long-term use of immunosuppressant medication      Rash due to systemic lupus erythematosus (SLE)      Heart burn      Encounter for screening for malignant neoplasm of colon      LGSIL on Pap smear of cervix      Benign tumor  h/o right thumb      S/P LEEP      S/P cone biopsy of cervix        ALLERGIES:  dairy products (Stomach Upset; Diarrhea)  lactose (Stomach Upset; Diarrhea)  Imuran (Rash)    HOME MEDICATIONS:  CellCept 500 mg oral tablet: 1 tab(s) orally 2 times a day  CellCept 500 mg oral tablet: 2 tab(s) orally 2 times a day  febuxostat 40 mg oral tablet: 1 tab(s) orally once a day am  hydroxychloroquine 200 mg oral tablet: 1 tablet orally monday-Friday  2 tablets orally on Saturday and   irbesartan 300 mg oral tablet: 1 tab(s) orally once a day am  iron 150 m tab daily  nebivolol 10 mg oral tablet: 1 tab(s) orally once a day  predniSONE 2.5 mg oral tablet: 1 orally once a day take along with 5mg for total dose 7.5  predniSONE 5 mg oral tablet: 1 orally once a day  Vitamin D3 125 mcg (5000 intl units) oral capsule: 1 cap(s) orally once a day  warfarin 10 mg oral tablet: 1 tablet orally once a day  Wegovy (1 mg dose) subcutaneous solution: 1 milligram(s) subcutaneously once a week on Saturday&#x27;s for weight loss    AICD/PPM: [ ] yes   [x ] no    PERTINENT LAB DATA:                      PHYSICAL EXAMINATION:    T(C): --  HR: --  BP: --  RR: --  SpO2: --    Constitutional: NAD  HEENT: PERRLA, EOMI,    Neck:  No JVD  Respiratory: CTAB/L  Cardiovascular: S1 and S2  Gastrointestinal: BS+, soft, NT/ND  Extremities: No peripheral edema  Neurological: A/O x 3, no focal deficits  Psychiatric: Normal mood, normal affect  Skin: No rashes    ASA Class: I [ ]  II [x]  III [ ]  IV [ ]    COMMENTS:    The patient is a suitable candidate for the planned procedure unless box checked [ ]  No, explain:

## 2023-06-26 ENCOUNTER — OUTPATIENT (OUTPATIENT)
Dept: OUTPATIENT SERVICES | Facility: HOSPITAL | Age: 53
LOS: 1 days | End: 2023-06-26
Payer: COMMERCIAL

## 2023-06-26 ENCOUNTER — APPOINTMENT (OUTPATIENT)
Dept: GASTROENTEROLOGY | Facility: HOSPITAL | Age: 53
End: 2023-06-26

## 2023-06-26 ENCOUNTER — TRANSCRIPTION ENCOUNTER (OUTPATIENT)
Age: 53
End: 2023-06-26

## 2023-06-26 ENCOUNTER — RESULT REVIEW (OUTPATIENT)
Age: 53
End: 2023-06-26

## 2023-06-26 VITALS
RESPIRATION RATE: 16 BRPM | SYSTOLIC BLOOD PRESSURE: 141 MMHG | DIASTOLIC BLOOD PRESSURE: 70 MMHG | HEART RATE: 87 BPM | OXYGEN SATURATION: 98 %

## 2023-06-26 VITALS
OXYGEN SATURATION: 98 % | HEIGHT: 68 IN | SYSTOLIC BLOOD PRESSURE: 116 MMHG | TEMPERATURE: 97 F | WEIGHT: 283.96 LBS | HEART RATE: 90 BPM | RESPIRATION RATE: 22 BRPM | DIASTOLIC BLOOD PRESSURE: 73 MMHG

## 2023-06-26 DIAGNOSIS — Z98.890 OTHER SPECIFIED POSTPROCEDURAL STATES: Chronic | ICD-10-CM

## 2023-06-26 DIAGNOSIS — R12 HEARTBURN: ICD-10-CM

## 2023-06-26 DIAGNOSIS — Z12.11 ENCOUNTER FOR SCREENING FOR MALIGNANT NEOPLASM OF COLON: ICD-10-CM

## 2023-06-26 DIAGNOSIS — D36.9 BENIGN NEOPLASM, UNSPECIFIED SITE: Chronic | ICD-10-CM

## 2023-06-26 PROCEDURE — 88305 TISSUE EXAM BY PATHOLOGIST: CPT

## 2023-06-26 PROCEDURE — 45380 COLONOSCOPY AND BIOPSY: CPT

## 2023-06-26 PROCEDURE — 88305 TISSUE EXAM BY PATHOLOGIST: CPT | Mod: 26

## 2023-06-26 PROCEDURE — 43239 EGD BIOPSY SINGLE/MULTIPLE: CPT

## 2023-06-26 PROCEDURE — 45380 COLONOSCOPY AND BIOPSY: CPT | Mod: PT

## 2023-06-26 PROCEDURE — 94640 AIRWAY INHALATION TREATMENT: CPT

## 2023-06-26 DEVICE — NET RETRV ROT ROTH 2.5MMX230CM: Type: IMPLANTABLE DEVICE | Status: FUNCTIONAL

## 2023-06-26 RX ORDER — LIDOCAINE HCL 20 MG/ML
4 VIAL (ML) INJECTION ONCE
Refills: 0 | Status: COMPLETED | OUTPATIENT
Start: 2023-06-26 | End: 2023-06-26

## 2023-06-26 RX ORDER — SODIUM CHLORIDE 9 MG/ML
500 INJECTION INTRAMUSCULAR; INTRAVENOUS; SUBCUTANEOUS
Refills: 0 | Status: DISCONTINUED | OUTPATIENT
Start: 2023-06-26 | End: 2023-07-10

## 2023-06-26 RX ADMIN — Medication 4 MILLILITER(S): at 09:20

## 2023-06-26 NOTE — ASU DISCHARGE PLAN (ADULT/PEDIATRIC) - NS MD DC FALL RISK RISK
For information on Fall & Injury Prevention, visit: [email or web address] OR  [email or web address] OR  https://www.cdc.gov/steadi/patient.html

## 2023-06-26 NOTE — PRE-ANESTHESIA EVALUATION ADULT - NSANTHPMHFT_GEN_ALL_CORE
52 year old female with PMH Lupus Nephritis, DVT (2012 - on coumadin), HTN, gout, obesity (BMI 43.2 - on Wegovy weekly), abnormal pap smear (LGSIL) s/p LEEP procedure (1/2023) and cone biopsy at [facility] (5/2023) with pending pathology results reports c/o nausea and vomiting after eating beef and pork that leaves a metallic and bitter aftertaste for a few years. She denies any abdominal pain, fever or chills. Pt now presents to [facility] for scheduled EGD Colonoscopy with Dr. Dawson on 6/26/23 at the endoscopy suite.

## 2023-06-26 NOTE — ASU PATIENT PROFILE, ADULT - FALL HARM RISK - UNIVERSAL INTERVENTIONS
Bed in lowest position, wheels locked, appropriate side rails in place/Call bell, personal items and telephone in reach/Instruct patient to call for assistance before getting out of bed or chair/Non-slip footwear when patient is out of bed/[facility] to call system/Physically safe environment - no spills, clutter or unnecessary equipment/Purposeful Proactive Rounding/Room/bathroom lighting operational, light cord in reach

## 2023-06-26 NOTE — PRE-ANESTHESIA EVALUATION ADULT - NSANTHOSAYNRD_GEN_A_CORE
neck circumference 16 inches/No. JIM screening performed.  STOP BANG Legend: 0-2 = LOW Risk; 3-4 = INTERMEDIATE Risk; 5-8 = HIGH Risk

## 2023-06-28 ENCOUNTER — NON-APPOINTMENT (OUTPATIENT)
Age: 53
End: 2023-06-28

## 2023-06-28 LAB — SURGICAL PATHOLOGY STUDY: SIGNIFICANT CHANGE UP

## 2023-07-24 ENCOUNTER — APPOINTMENT (OUTPATIENT)
Dept: RHEUMATOLOGY | Facility: CLINIC | Age: 53
End: 2023-07-24
Payer: COMMERCIAL

## 2023-07-24 VITALS
HEART RATE: 86 BPM | BODY MASS INDEX: 42.89 KG/M2 | OXYGEN SATURATION: 98 % | DIASTOLIC BLOOD PRESSURE: 61 MMHG | RESPIRATION RATE: 16 BRPM | WEIGHT: 283 LBS | TEMPERATURE: 97.1 F | SYSTOLIC BLOOD PRESSURE: 95 MMHG | HEIGHT: 68 IN

## 2023-07-24 DIAGNOSIS — E55.9 VITAMIN D DEFICIENCY, UNSPECIFIED: ICD-10-CM

## 2023-07-24 PROBLEM — Z79.01 LONG TERM (CURRENT) USE OF ANTICOAGULANTS: Chronic | Status: ACTIVE | Noted: 2023-06-14

## 2023-07-24 PROBLEM — B02.9 ZOSTER WITHOUT COMPLICATIONS: Chronic | Status: ACTIVE | Noted: 2023-06-14

## 2023-07-24 PROBLEM — Z79.60 LONG TERM (CURRENT) USE OF UNSPECIFIED IMMUNOMODULATORS AND IMMUNOSUPPRESSANTS: Chronic | Status: ACTIVE | Noted: 2023-06-14

## 2023-07-24 PROBLEM — N18.9 CHRONIC KIDNEY DISEASE, UNSPECIFIED: Chronic | Status: ACTIVE | Noted: 2023-06-14

## 2023-07-24 PROBLEM — R87.612 LOW GRADE SQUAMOUS INTRAEPITHELIAL LESION ON CYTOLOGIC SMEAR OF CERVIX (LGSIL): Chronic | Status: ACTIVE | Noted: 2023-06-14

## 2023-07-24 PROBLEM — M32.9 SYSTEMIC LUPUS ERYTHEMATOSUS, UNSPECIFIED: Chronic | Status: ACTIVE | Noted: 2023-06-14

## 2023-07-24 PROBLEM — R12 HEARTBURN: Chronic | Status: ACTIVE | Noted: 2023-06-14

## 2023-07-24 PROBLEM — Z12.11 ENCOUNTER FOR SCREENING FOR MALIGNANT NEOPLASM OF COLON: Chronic | Status: ACTIVE | Noted: 2023-06-14

## 2023-07-24 LAB
INR PPP: 3.78 RATIO
PT BLD: 44.4 SEC

## 2023-07-24 PROCEDURE — 99214 OFFICE O/P EST MOD 30 MIN: CPT

## 2023-07-25 VITALS — SYSTOLIC BLOOD PRESSURE: 114 MMHG | HEART RATE: 83 BPM | DIASTOLIC BLOOD PRESSURE: 82 MMHG

## 2023-07-25 LAB
25(OH)D3 SERPL-MCNC: 39.6 NG/ML
ALBUMIN SERPL ELPH-MCNC: 4 G/DL
ALP BLD-CCNC: 86 U/L
ALT SERPL-CCNC: 15 U/L
ANION GAP SERPL CALC-SCNC: 15 MMOL/L
APPEARANCE: CLEAR
AST SERPL-CCNC: 13 U/L
BACTERIA: NEGATIVE /HPF
BILIRUB SERPL-MCNC: 0.3 MG/DL
BILIRUBIN URINE: NEGATIVE
BLOOD URINE: NEGATIVE
BUN SERPL-MCNC: 26 MG/DL
C3 SERPL-MCNC: 88 MG/DL
C4 SERPL-MCNC: 13 MG/DL
CALCIUM SERPL-MCNC: 8.8 MG/DL
CAST: 4 /LPF
CHLORIDE SERPL-SCNC: 112 MMOL/L
CK SERPL-CCNC: 83 U/L
CO2 SERPL-SCNC: 15 MMOL/L
COLOR: YELLOW
CREAT SERPL-MCNC: 1.74 MG/DL
CREAT SPEC-SCNC: 251 MG/DL
CREAT/PROT UR: 0.1 RATIO
DSDNA AB SER-ACNC: 27 IU/ML
EGFR: 35 ML/MIN/1.73M2
EPITHELIAL CELLS: 12 /HPF
GLUCOSE QUALITATIVE U: NEGATIVE MG/DL
KETONES URINE: NEGATIVE MG/DL
LEUKOCYTE ESTERASE URINE: NEGATIVE
MICROSCOPIC-UA: NORMAL
NITRITE URINE: NEGATIVE
PH URINE: 5.5
POTASSIUM SERPL-SCNC: 4 MMOL/L
PROT SERPL-MCNC: 6.5 G/DL
PROT UR-MCNC: 12 MG/DL
PROTEIN URINE: NORMAL MG/DL
RED BLOOD CELLS URINE: NORMAL /HPF
REVIEW: NORMAL
SODIUM SERPL-SCNC: 141 MMOL/L
SPECIFIC GRAVITY URINE: 1.02
URATE SERPL-MCNC: 5.1 MG/DL
UROBILINOGEN URINE: 0.2 MG/DL
WHITE BLOOD CELLS URINE: 6 /HPF

## 2023-07-25 NOTE — HISTORY OF PRESENT ILLNESS
[FreeTextEntry1] : 1.  SLE:  consisting of arthritis, nasal ulcers, Raynauds, alopecia, rash, and nephritis.  Rash was present during late 2014 and 2015, although during the late summer 2015, it wasn't particularly active. However, the rash was quite active in the spring, summer, and into the fall 2016 despite her medical regimen. It was  most prominent on the left arm and back.  Into 2017 the rash quieted down with the use of topical steroids. In 2018 it was intermittently active, but in late 2018/early 2019 the rash was quite active on the face, back, and hands.  She received an injection and topical therapy by her dermatologist.  The rash has remained active on her arms and thighs.  In addition, she has developed red spots (some tender) on the palmar aspects of her fingers. Her rash on the arms started to scale/improve. Arthralgias more prominent in summer, which is what is happening in summer 2019. Fingers, elbows and knees. Buttock rash present in 5/19 faded, but returned on arms, back. She saw VALARIE Flores who prescribed topicals, which have been helpful-as of Jan 2020 the active rash was confined to her buttocks. However, during the first part of 2020 the rash worsened despite the use of topicals.  The rash was quiet in early 2021.  She was not able to reduce prednisone to 7.5 mg/d as her hand arthritis worsened. The rash on her flanks became active, and she used topical steroids (triamcinolone 0.1%) with minimal success.  In Jul 2021 she reported  pain and swelling in her joints, mainly the hands. Rash was active on the RUE in Sep 2021. This improved, but she developed a rash on the right flank. In Feb 2020 there were few active manifestations of SLE. In 2023 she developed rash in the right flank, which partially responded to topical steroids. By May 2023 the rash was inactive. \par 2.  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  She has been treated with CellCept and remained on 2500 mg daily. She has had low-grade proteinuria. A 24 hour urine was to be collected in November 2016.  I was always concerned she had smoldering LN activity.  In the spring 2017 she stopped the CellCept on her own and then developed a major flare with an increase in creatinine to 2.5 with a urinary Pr/Cr of 5.  She was put back on CellCept and prednisone 40 mg/d.  The biopsy from late September 2017 demonstrated a class IV-G (A and C) lesion with 44% of the glomeruli sclerosed.  In addition, she had tubular atrophy and interstitial fibrosis estimated at about 50% (Activity: 10/24; Chronicity: 9/12).  It was decided despite the chronicity to try pulse steroids. Her creatinine went up but came back down to the high 2's. It dropped further to the low 2's. She was not seen from Dec 2017 until early Mar 2018.  She was doing relatively well clinically.  Lab tests were in order however. With a slight increase in furosemide, the edema improved. The creatinine has come down, albeit not normal. Given her chronic serologic activity and proteinuria, a repeat biopsy is in order.  She had a lot of chronicity on her last biopsy. A repeat biopsy was discussed to determine whether activity persists and whether a new therapeutic approach should be taken. Based on labs there seems to be a mixed picture, and without a kidney biopsy the activity of her kidney cannot be discerned, although she will no doubt have a lot of damage. The biopsy, which was performed in the spring 2019, failed to show activity; however, there was damage.  Therefore, I can't justify changing therapies. \par 3.  Lower extremity edema:  she had an ECHO and was told it was OK.  No back pain.  The edema came and went, but it has been diuretic responsive.  Her regimen of furosemide alternating with hydrochlorothiazide was switched to torsemide. She then went off all diuretics in early November 2017. The edema returned in the spring of 2019 perhaps because of an increase in prednisone. Pt could not tolerate furosemide 60 mg, so went down to 40 mg/d. However, on this dose she had edema, and it was suggested that she raise the dose. The edema disappeared when she took a higher dose of her furosemide. It got much better when she stopped the amlodipine. \par 4.  Chronic CellCept use:  tolerating without GI symptoms.\par 5.  Chronic Plaquenil use: no ocular symptoms\par 6.  Vitamin D deficiency\par 7.  Ocular migraine:  episode in late summer 2015\par 8.  URI\par 9.  Knee pain: intermittent right knee pain.  In Jul 2023 she had an effusion in the right knee. Aspiration was offered, but she decided to wait. \par 10.  Angioedema:  episodes in the fall 2015 involving the lips and eyes prompted an evaluation of allergy.  No allergen was identified.  \par 12.  Dysesthesias in the legs: etiology unclear, but this symptom began in early 2016.\par 13.  Muscle cramps: she feels is related to diuretic use.  She cut down by using HCTZ, but this has not been an effective diuretic. \par 14.  Dry eyes: using topicals\par 15.  Hypertension:  remains elevated.  She was seen by GYN in the late spring 2017, her BP was 150/100 as it was on her June 28, 2017 visit. Additional therapy was warranted. Into 2019 her BP remained high. During her first visit of 2020 it was high again-though she had run out of Bystolic. She has noted feeling pre-syncopal at home when arising from a seated position.  With a low BP recorded on Jun 14, 2021, perhaps she is relatively hypotensive at home.  \par 16.  Shingles:  outbreak on the right hemithorax end of October 2017 responsive to Valtrex. \par 17.  Hyperkalemia: she had high potassium's, but it was unclear if they were a result of difficulty drawing blood or from nephritis and/or valsartan.  The valsartan was stopped.  She started another what sounds like ARB (probably eprosartan).\par 18.  GERD: evaluated by CHEL Flores, including an endoscopy.  A variety of drugs failed.  She was referred for a motility evaluation.  \par 19.  Gout:  episode of podagra left foot in August 2018.  She received prednisone with initial relief; however, a recurrence required another course.  No hx of kidney stones, tophi. She then experienced a similar episode in the right great toe necessitating more steroids.  Uloric was prescribed but not taken. In Sept 2019 she presented with 1 month of left 1st MTP pain and swelling. She decided to take Uloric, but in early 2020 stopped. It was restarted, but it may be that she needs a higher dose.  In late Apr 2021 she developed podagra (right great toe) that was unresponsive to increasing prednisone to 15 mg/d. This has lingered far longer than expected and requires 20-40 mg/d of prednisone. \par 20.  Rash: onset of rash left buttock in Oct 2018; etiology unclear but did not seem like shingles.  This rash returned in the spring 2019.  She used topical steroids. \par 21.  Headache: HA relieved with Tylenol during the early part of March 2019.  BPs have not been high enough to explain the HA. \par 22.  Anemia: the pattern appeared to be iron deficiency.  In 2023 she mentioned heavy periods were responsible.\par 23.  Jaw pain: onset of bilateral jaw pain in Jan 2020 for which she attained some relief by increasing the prednisone to 20 mg/d for two days.  Whether it was truly TMJ or perhaps parotid is unclear. It reproducibly improved on steroids. \par 24.  Low back pain: onset in the late spring 2020 of low back pain without trauma or radiation. \par 25.  Muscle cramps: episode in mid-Oct 2020 of diffuse muscle cramps, the cause of which was not known. Perhaps related to diuretics. The cramps were quite bothersome during 2022.\par 26. Paresthesias:  affecting fingertips and toes reported in early 2021. Noted at night. No color changes.  \par 27. DVT: a below the knee DVT in the right leg.  With positive aPL Ab, I decided to treat with warfarin.  For how long is another question. \par 28.  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 Recurrences treated with Valtrex. \par 27.  Avella's pain: pain at insertion end of Oct 2021.  I suppose this could be gout. It prevents her from walking and generally subsides in one week. \par 28.  Rash: She reported in Aug 2022 solitary pruritic lesions on her lower back that dry up with steroid cream. \par 29.  LGSIL: scheduled for procedure May 26, 2023. Issue raised about anticoagulation.  I think it sufficient to stop warfarin three days before the procedure and resume the night of the procedure.\par \par Meds\par warfarin 10 mg/d\par Uloric 40 mg/d  \par furosemide 40 mg/d  \par prednisone 7.5 mg/d  \par Wegovy\par CellCept 2000 mg/d in divided doses\par irbesartan 300 mg \par Bystolic 10 mg/d\par Plaquenil 200 mg/d weekdays and 400 mg/d weekends\par Vit D 5000 IU/d\par Valtrex prn\par Ferrex 150 mg/d\par Feraheme spring 2023\par \par Vaccines\par PNVX 3/24/2008\par PNVX 1/7/2013 ([id number]; 30Jan2015)\par PNVX 23  2/14/19  ([id number]; 8/2/20)\par Prevnar 13 valent 2/3/2015 ([id number]; exp 3/16)\par Quadrivalent fluzone  9/29/2015 ( AA; exp 6/2016)\par Flu Quadrivalent 09/27/2021 (UT [id number]; exp 6/30/2022)\par Shingrix 8/15/22  ([id number], exp 3/25/23; [id number]; exp 3/25/23)\par \par

## 2023-07-25 NOTE — ASSESSMENT
[FreeTextEntry1] : 1.  SLE:  consisting of arthritis, nasal ulcers, Raynauds, alopecia, rash, and nephritis.  Rash was present during late 2014 and 2015, although during the late summer 2015, it wasn't particularly active. However, the rash was quite active in the spring, summer, and into the fall 2016 despite her medical regimen. It was  most prominent on the left arm and back.  Into 2017 the rash quieted down with the use of topical steroids. In 2018 it was intermittently active, but in late 2018/early 2019 the rash was quite active on the face, back, and hands.  She received an injection and topical therapy by her dermatologist.  The rash has remained active on her arms and thighs.  In addition, she has developed red spots (some tender) on the palmar aspects of her fingers. Her rash on the arms started to scale/improve. Arthralgias more prominent in summer, which is what is happening in summer 2019. Fingers, elbows and knees. Buttock rash present in 5/19 faded, but returned on arms, back. She saw VALARIE Flores who prescribed topicals, which have been helpful-as of Jan 2020 the active rash was confined to her buttocks. However, during the first part of 2020 the rash worsened despite the use of topicals.  The rash was quiet in early 2021.  She was not able to reduce prednisone to 7.5 mg/d as her hand arthritis worsened. The rash on her flanks became active, and she used topical steroids (triamcinolone 0.1%) with minimal success.  In Jul 2021 she reported  pain and swelling in her joints, mainly the hands. Rash was active on the RUE in Sep 2021. This improved, but she developed a rash on the right flank. In Feb 2020 there were few active manifestations of SLE. In 2023 she developed rash in the right flank, which partially responded to topical steroids. By May 2023 the rash was inactive. \par 2.  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  She has been treated with CellCept and remained on 2500 mg daily. She has had low-grade proteinuria. A 24 hour urine was to be collected in November 2016.  I was always concerned she had smoldering LN activity.  In the spring 2017 she stopped the CellCept on her own and then developed a major flare with an increase in creatinine to 2.5 with a urinary Pr/Cr of 5.  She was put back on CellCept and prednisone 40 mg/d.  The biopsy from late September 2017 demonstrated a class IV-G (A and C) lesion with 44% of the glomeruli sclerosed.  In addition, she had tubular atrophy and interstitial fibrosis estimated at about 50% (Activity: 10/24; Chronicity: 9/12).  It was decided despite the chronicity to try pulse steroids. Her creatinine went up but came back down to the high 2's. It dropped further to the low 2's. She was not seen from Dec 2017 until early Mar 2018.  She was doing relatively well clinically.  Lab tests were in order however. With a slight increase in furosemide, the edema improved. The creatinine has come down, albeit not normal. Given her chronic serologic activity and proteinuria, a repeat biopsy is in order.  She had a lot of chronicity on her last biopsy. A repeat biopsy was discussed to determine whether activity persists and whether a new therapeutic approach should be taken. Based on labs there seems to be a mixed picture, and without a kidney biopsy the activity of her kidney cannot be discerned, although she will no doubt have a lot of damage. The biopsy, which was performed in the spring 2019, failed to show activity; however, there was damage.  Therefore, I can't justify changing therapies. \par 3.  Lower extremity edema:  she had an ECHO and was told it was OK.  No back pain.  The edema came and went, but it has been diuretic responsive.  Her regimen of furosemide alternating with hydrochlorothiazide was switched to torsemide. She then went off all diuretics in early November 2017. The edema returned in the spring of 2019 perhaps because of an increase in prednisone. Pt could not tolerate furosemide 60 mg, so went down to 40 mg/d. However, on this dose she had edema, and it was suggested that she raise the dose. The edema disappeared when she took a higher dose of her furosemide. It got much better when she stopped the amlodipine. \par 4.  Chronic CellCept use:  tolerating without GI symptoms.\par 5.  Chronic Plaquenil use: no ocular symptoms\par 6.  Vitamin D deficiency\par 7.  Ocular migraine:  episode in late summer 2015\par 8.  URI\par 9.  Knee pain: intermittent right knee pain.  In Jul 2023 she had an effusion in the right knee. Aspiration was offered, but she decided to wait. \par 10.  Angioedema:  episodes in the fall 2015 involving the lips and eyes prompted an evaluation of allergy.  No allergen was identified.  \par 12.  Dysesthesias in the legs: etiology unclear, but this symptom began in early 2016.\par 13.  Muscle cramps: she feels is related to diuretic use.  She cut down by using HCTZ, but this has not been an effective diuretic. \par 14.  Dry eyes: using topicals\par 15.  Hypertension:  remains elevated.  She was seen by GYN in the late spring 2017, her BP was 150/100 as it was on her June 28, 2017 visit. Additional therapy was warranted. Into 2019 her BP remained high. During her first visit of 2020 it was high again-though she had run out of Bystolic. She has noted feeling pre-syncopal at home when arising from a seated position.  With a low BP recorded on Jun 14, 2021, perhaps she is relatively hypotensive at home.  \par 16.  Shingles:  outbreak on the right hemithorax end of October 2017 responsive to Valtrex. \par 17.  Hyperkalemia: she had high potassium's, but it was unclear if they were a result of difficulty drawing blood or from nephritis and/or valsartan.  The valsartan was stopped.  She started another what sounds like ARB (probably eprosartan).\par 18.  GERD: evaluated by CHEL Flores, including an endoscopy.  A variety of drugs failed.  She was referred for a motility evaluation.  \par 19.  Gout:  episode of podagra left foot in August 2018.  She received prednisone with initial relief; however, a recurrence required another course.  No hx of kidney stones, tophi. She then experienced a similar episode in the right great toe necessitating more steroids.  Uloric was prescribed but not taken. In Sept 2019 she presented with 1 month of left 1st MTP pain and swelling. She decided to take Uloric, but in early 2020 stopped. It was restarted, but it may be that she needs a higher dose.  In late Apr 2021 she developed podagra (right great toe) that was unresponsive to increasing prednisone to 15 mg/d. This has lingered far longer than expected and requires 20-40 mg/d of prednisone. \par 20.  Rash: onset of rash left buttock in Oct 2018; etiology unclear but did not seem like shingles.  This rash returned in the spring 2019.  She used topical steroids. \par 21.  Headache: HA relieved with Tylenol during the early part of March 2019.  BPs have not been high enough to explain the HA. \par 22.  Anemia: the pattern appeared to be iron deficiency.  In 2023 she mentioned heavy periods were responsible.\par 23.  Jaw pain: onset of bilateral jaw pain in Jan 2020 for which she attained some relief by increasing the prednisone to 20 mg/d for two days.  Whether it was truly TMJ or perhaps parotid is unclear. It reproducibly improved on steroids. \par 24.  Low back pain: onset in the late spring 2020 of low back pain without trauma or radiation. \par 25.  Muscle cramps: episode in mid-Oct 2020 of diffuse muscle cramps, the cause of which was not known. Perhaps related to diuretics. The cramps were quite bothersome during 2022.\par 26. Paresthesias:  affecting fingertips and toes reported in early 2021. Noted at night. No color changes.  \par 27. DVT: a below the knee DVT in the right leg.  With positive aPL Ab, I decided to treat with warfarin.  For how long is another question. \par 28.  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 Recurrences treated with Valtrex. \par 27.  [place]'s pain: pain at insertion end of Oct 2021.  I suppose this could be gout. It prevents her from walking and generally subsides in one week. \par 28.  Rash: She reported in Aug 2022 solitary pruritic lesions on her lower back that dry up with steroid cream. \par 29.  LGSIL: scheduled for procedure May 26, 2023. Issue raised about anticoagulation.  I think it sufficient to stop warfarin three days before the procedure and resume the night of the procedure.\par \par Plan:\par 1.  Lab tests\par 2.  Return 6-8 weeks\par 3.  Continue MMF 1 g 2xd\par 4.  Continue q 1-2 weekly INR checks\par 5.  Try to reduce prednisone to 5 mg/d \par 6.  Calcium 600 mg 2xd\par 7.  Consider x-ray of right knee +/- injection\par 8.  ? SGLT2I\par 9.  High risk medications used in the treatment of rheumatic diseases include steroids, disease modifying agents, immunosuppressive therapies, antimalarials, biologics, and chemotherapy. Regardless of which drug or class of drug, the potential toxicities of these therapies mandate close monitoring in the form of a history, physical, and laboratory tests.\par 10.  Systemic Lupus Erythematosus, known as lupus, is a chronic autoimmune disease that can affect any organ in the body posing threats to proper organ function and even to life. Therefore, close surveillance of all bodily functions is required, including but not limited to central and peripheral nervous system, ocular and auditory systems, cardiopulmonary function, kidney function, mucocutaneous and musculoskeletal systems as well as constitutional manifestations. Surveillance consists of history, physical, and laboratory tests. Treatment varies, but most of the drugs used are high risk and therefore also require close monitoring in the form of blood and urine tests.\par 11.  Anticoagulation for upcoming procedures: I think sufficient to stop warfarin three days prior to procedure and resume the night of. \par \par   \par \par \par

## 2023-07-25 NOTE — PHYSICAL EXAM
[General Appearance - Alert] : alert [General Appearance - In No Acute Distress] : in no acute distress [General Appearance - Well Nourished] : well nourished [General Appearance - Well Developed] : well developed [General Appearance - Well-Appearing] : healthy appearing [Sclera] : the sclera and conjunctiva were normal [Extraocular Movements] : extraocular movements were intact [Outer Ear] : the ears and nose were normal in appearance [Strabismus] : no strabismus was seen [Examination Of The Oral Cavity] : the lips and gums were normal [Nasal Cavity] : the nasal mucosa and septum were normal [Oropharynx] : the oropharynx was normal [Neck Appearance] : the appearance of the neck was normal [Neck Cervical Mass (___cm)] : no neck mass was observed [Jugular Venous Distention Increased] : there was no jugular-venous distention [Thyroid Diffuse Enlargement] : the thyroid was not enlarged [Respiration, Rhythm And Depth] : normal respiratory rhythm and effort [Exaggerated Use Of Accessory Muscles For Inspiration] : no accessory muscle use [Auscultation Breath Sounds / Voice Sounds] : lungs were clear to auscultation bilaterally [Heart Rate And Rhythm] : heart rate was normal and rhythm regular [Heart Sounds] : normal S1 and S2 [Heart Sounds Gallop] : no gallops [Murmurs] : no murmurs [Heart Sounds Pericardial Friction Rub] : no pericardial rub [Veins - Varicosity Changes] : there were no varicosital changes [Bowel Sounds] : normal bowel sounds [Abdomen Soft] : soft [] : no hepato-splenomegaly [Abdomen Tenderness] : non-tender [Abdomen Mass (___ Cm)] : no abdominal mass palpated [Cervical Lymph Nodes Enlarged Posterior Bilaterally] : posterior cervical [Cervical Lymph Nodes Enlarged Anterior Bilaterally] : anterior cervical [Supraclavicular Lymph Nodes Enlarged Bilaterally] : supraclavicular [No CVA Tenderness] : no ~M costovertebral angle tenderness [Abnormal Walk] : normal gait [No Spinal Tenderness] : no spinal tenderness [Nail Clubbing] : no clubbing  or cyanosis of the fingernails [Motor Tone] : muscle strength and tone were normal [Skin Turgor] : normal skin turgor [FreeTextEntry1] : scattered hyperpigmentation  [Sensation] : the sensory exam was normal to light touch and pinprick [Motor Exam] : the motor exam was normal [Oriented To Time, Place, And Person] : oriented to person, place, and time [Affect] : the affect was normal [Impaired Insight] : insight and judgment were intact [Mood] : the mood was normal

## 2023-07-28 LAB — HYDROXYCHLOROQUINE CONCENTRATION: 1343 NG/ML

## 2023-08-24 NOTE — DISCHARGE NOTE ADULT - CARE PROVIDER_API CALL
----- Message from Marline Hernandez DO sent at 8/24/2023  6:53 AM CDT -----  I will review the results with Inocencia at her upcoming appointment   Kulwant Greco (DO), Internal Medicine; Nephrology  [address] [facility] [address]  [place] [address]  Phone: [phone]  Fax: [phone] Kulwant Greco (DO), Internal Medicine; Nephrology  [address] [facility] [address]  [place] [address]  Phone: [phone]  Fax: [phone]

## 2023-09-06 LAB
INR PPP: 4.02 RATIO
PT BLD: 43.6 SEC

## 2023-09-06 RX ORDER — WARFARIN 7.5 MG/1
7.5 TABLET ORAL
Qty: 30 | Refills: 3 | Status: ACTIVE | COMMUNITY
Start: 2023-09-06 | End: 1900-01-01

## 2023-09-13 LAB
INR PPP: 1.62 RATIO
PT BLD: 18.1 SEC

## 2023-09-20 NOTE — ED ADULT NURSE NOTE - GASTROINTESTINAL WDL
Abdomen soft, nontender, nondistended, bowel sounds present in all 4 quadrants. Erythromycin Pregnancy And Lactation Text: This medication is Pregnancy Category B and is considered safe during pregnancy. It is also excreted in breast milk.

## 2023-09-21 ENCOUNTER — APPOINTMENT (OUTPATIENT)
Dept: ULTRASOUND IMAGING | Facility: CLINIC | Age: 53
End: 2023-09-21
Payer: COMMERCIAL

## 2023-09-21 PROCEDURE — 93971 EXTREMITY STUDY: CPT | Mod: RT

## 2023-09-28 ENCOUNTER — TRANSCRIPTION ENCOUNTER (OUTPATIENT)
Age: 53
End: 2023-09-28

## 2023-09-29 LAB
INR PPP: 2.34 RATIO
PT BLD: 26 SEC

## 2023-10-02 ENCOUNTER — TRANSCRIPTION ENCOUNTER (OUTPATIENT)
Age: 53
End: 2023-10-02

## 2023-10-02 ENCOUNTER — APPOINTMENT (OUTPATIENT)
Dept: RHEUMATOLOGY | Facility: CLINIC | Age: 53
End: 2023-10-02
Payer: COMMERCIAL

## 2023-10-02 VITALS
DIASTOLIC BLOOD PRESSURE: 83 MMHG | SYSTOLIC BLOOD PRESSURE: 138 MMHG | BODY MASS INDEX: 42.44 KG/M2 | HEART RATE: 89 BPM | HEIGHT: 68 IN | WEIGHT: 280 LBS | OXYGEN SATURATION: 92 %

## 2023-10-02 DIAGNOSIS — D50.9 IRON DEFICIENCY ANEMIA, UNSPECIFIED: ICD-10-CM

## 2023-10-02 DIAGNOSIS — M25.561 PAIN IN RIGHT KNEE: ICD-10-CM

## 2023-10-02 LAB
ALBUMIN SERPL ELPH-MCNC: 3.9 G/DL
ALP BLD-CCNC: 118 U/L
ALT SERPL-CCNC: 21 U/L
ANION GAP SERPL CALC-SCNC: 13 MMOL/L
APPEARANCE: CLEAR
AST SERPL-CCNC: 17 U/L
BACTERIA: ABNORMAL /HPF
BASOPHILS # BLD AUTO: 0.07 K/UL
BASOPHILS NFR BLD AUTO: 0.6 %
BILIRUB SERPL-MCNC: 0.3 MG/DL
BILIRUBIN URINE: NEGATIVE
BLOOD URINE: NEGATIVE
BUN SERPL-MCNC: 25 MG/DL
CALCIUM SERPL-MCNC: 9.6 MG/DL
CAST: 1 /LPF
CHLORIDE SERPL-SCNC: 110 MMOL/L
CO2 SERPL-SCNC: 18 MMOL/L
COLOR: YELLOW
CREAT SERPL-MCNC: 1.84 MG/DL
CREAT SPEC-SCNC: 157 MG/DL
CREAT/PROT UR: 0.1 RATIO
EGFR: 33 ML/MIN/1.73M2
EOSINOPHIL # BLD AUTO: 0.07 K/UL
EOSINOPHIL NFR BLD AUTO: 0.6 %
EPITHELIAL CELLS: 7 /HPF
GLUCOSE QUALITATIVE U: NEGATIVE MG/DL
HCT VFR BLD CALC: 35.9 %
HGB BLD-MCNC: 11.6 G/DL
IMM GRANULOCYTES NFR BLD AUTO: 0.6 %
KETONES URINE: NEGATIVE MG/DL
LEUKOCYTE ESTERASE URINE: NEGATIVE
LYMPHOCYTES # BLD AUTO: 1.95 K/UL
LYMPHOCYTES NFR BLD AUTO: 15.6 %
MAN DIFF?: NORMAL
MCHC RBC-ENTMCNC: 26.3 PG
MCHC RBC-ENTMCNC: 32.3 GM/DL
MCV RBC AUTO: 81.4 FL
MICROSCOPIC-UA: NORMAL
MONOCYTES # BLD AUTO: 0.97 K/UL
MONOCYTES NFR BLD AUTO: 7.8 %
NEUTROPHILS # BLD AUTO: 9.36 K/UL
NEUTROPHILS NFR BLD AUTO: 74.8 %
NITRITE URINE: NEGATIVE
PH URINE: 5.5
PLATELET # BLD AUTO: 177 K/UL
POTASSIUM SERPL-SCNC: 4.3 MMOL/L
PROT SERPL-MCNC: 7 G/DL
PROT UR-MCNC: 11 MG/DL
PROTEIN URINE: NORMAL MG/DL
RBC # BLD: 4.41 M/UL
RBC # FLD: 17.6 %
RED BLOOD CELLS URINE: 0 /HPF
SODIUM SERPL-SCNC: 141 MMOL/L
SPECIFIC GRAVITY URINE: 1.02
UROBILINOGEN URINE: 0.2 MG/DL
WBC # FLD AUTO: 12.49 K/UL
WHITE BLOOD CELLS URINE: 3 /HPF

## 2023-10-02 PROCEDURE — 99215 OFFICE O/P EST HI 40 MIN: CPT | Mod: 25

## 2023-10-02 PROCEDURE — 20610 DRAIN/INJ JOINT/BURSA W/O US: CPT | Mod: RT

## 2023-10-03 LAB
C3 SERPL-MCNC: 106 MG/DL
C4 SERPL-MCNC: 14 MG/DL
DSDNA AB SER-ACNC: 20 IU/ML

## 2023-10-03 RX ADMIN — METHYLPREDNISOLONE ACETATE 0 MG/ML: 40 INJECTION, SUSPENSION INTRA-ARTICULAR; INTRALESIONAL; INTRAMUSCULAR; SOFT TISSUE at 00:00

## 2023-10-03 RX ADMIN — LIDOCAINE HYDROCHLORIDE 0 %: 10 INJECTION, SOLUTION EPIDURAL; INFILTRATION; INTRACAUDAL; PERINEURAL at 00:00

## 2023-10-04 RX ORDER — METHYLPRED ACET/NACL,ISO-OS/PF 40 MG/ML
40 VIAL (ML) INJECTION
Qty: 1 | Refills: 0 | Status: COMPLETED | OUTPATIENT
Start: 2023-10-03

## 2023-10-04 RX ORDER — LIDOCAINE HYDROCHLORIDE 10 MG/ML
1 INJECTION, SOLUTION INFILTRATION
Qty: 0 | Refills: 0 | Status: COMPLETED | OUTPATIENT
Start: 2023-10-03

## 2023-10-06 LAB — HYDROXYCHLOROQUINE CONCENTRATION: 1399 NG/ML

## 2023-10-16 ENCOUNTER — OUTPATIENT (OUTPATIENT)
Dept: OUTPATIENT SERVICES | Facility: HOSPITAL | Age: 53
LOS: 1 days | End: 2023-10-16
Payer: COMMERCIAL

## 2023-10-16 VITALS
TEMPERATURE: 98 F | OXYGEN SATURATION: 100 % | HEART RATE: 86 BPM | RESPIRATION RATE: 18 BRPM | SYSTOLIC BLOOD PRESSURE: 134 MMHG | DIASTOLIC BLOOD PRESSURE: 89 MMHG | HEIGHT: 68 IN | WEIGHT: 274.92 LBS

## 2023-10-16 DIAGNOSIS — Z98.890 OTHER SPECIFIED POSTPROCEDURAL STATES: Chronic | ICD-10-CM

## 2023-10-16 DIAGNOSIS — Z01.818 ENCOUNTER FOR OTHER PREPROCEDURAL EXAMINATION: ICD-10-CM

## 2023-10-16 DIAGNOSIS — D36.9 BENIGN NEOPLASM, UNSPECIFIED SITE: Chronic | ICD-10-CM

## 2023-10-16 LAB
A1C WITH ESTIMATED AVERAGE GLUCOSE RESULT: 5 % — SIGNIFICANT CHANGE UP (ref 4–5.6)
A1C WITH ESTIMATED AVERAGE GLUCOSE RESULT: 5 % — SIGNIFICANT CHANGE UP (ref 4–5.6)
ANION GAP SERPL CALC-SCNC: 6 MMOL/L — SIGNIFICANT CHANGE UP (ref 5–17)
APTT BLD: 43.8 SEC — HIGH (ref 24.5–35.6)
BASOPHILS # BLD AUTO: 0.07 K/UL — SIGNIFICANT CHANGE UP (ref 0–0.2)
BASOPHILS NFR BLD AUTO: 0.5 % — SIGNIFICANT CHANGE UP (ref 0–2)
BLD GP AB SCN SERPL QL: SIGNIFICANT CHANGE UP
BUN SERPL-MCNC: 33 MG/DL — HIGH (ref 7–23)
CALCIUM SERPL-MCNC: 8.7 MG/DL — SIGNIFICANT CHANGE UP (ref 8.5–10.1)
CHLORIDE SERPL-SCNC: 116 MMOL/L — HIGH (ref 96–108)
CO2 SERPL-SCNC: 19 MMOL/L — LOW (ref 22–31)
CREAT SERPL-MCNC: 2.05 MG/DL — HIGH (ref 0.5–1.3)
EGFR: 29 ML/MIN/1.73M2 — LOW
EOSINOPHIL # BLD AUTO: 0.09 K/UL — SIGNIFICANT CHANGE UP (ref 0–0.5)
EOSINOPHIL NFR BLD AUTO: 0.7 % — SIGNIFICANT CHANGE UP (ref 0–6)
ESTIMATED AVERAGE GLUCOSE: 97 MG/DL — SIGNIFICANT CHANGE UP (ref 68–114)
ESTIMATED AVERAGE GLUCOSE: 97 MG/DL — SIGNIFICANT CHANGE UP (ref 68–114)
GLUCOSE SERPL-MCNC: 88 MG/DL — SIGNIFICANT CHANGE UP (ref 70–99)
HCT VFR BLD CALC: 36.3 % — SIGNIFICANT CHANGE UP (ref 34.5–45)
HGB BLD-MCNC: 12.3 G/DL — SIGNIFICANT CHANGE UP (ref 11.5–15.5)
IMM GRANULOCYTES NFR BLD AUTO: 0.4 % — SIGNIFICANT CHANGE UP (ref 0–0.9)
INR BLD: 2.59 RATIO — HIGH (ref 0.85–1.18)
LYMPHOCYTES # BLD AUTO: 1.93 K/UL — SIGNIFICANT CHANGE UP (ref 1–3.3)
LYMPHOCYTES # BLD AUTO: 14.6 % — SIGNIFICANT CHANGE UP (ref 13–44)
MAGNESIUM SERPL-MCNC: 1.8 MG/DL — SIGNIFICANT CHANGE UP (ref 1.6–2.6)
MCHC RBC-ENTMCNC: 26.6 PG — LOW (ref 27–34)
MCHC RBC-ENTMCNC: 33.9 GM/DL — SIGNIFICANT CHANGE UP (ref 32–36)
MCV RBC AUTO: 78.6 FL — LOW (ref 80–100)
MONOCYTES # BLD AUTO: 0.71 K/UL — SIGNIFICANT CHANGE UP (ref 0–0.9)
MONOCYTES NFR BLD AUTO: 5.4 % — SIGNIFICANT CHANGE UP (ref 2–14)
NEUTROPHILS # BLD AUTO: 10.34 K/UL — HIGH (ref 1.8–7.4)
NEUTROPHILS NFR BLD AUTO: 78.4 % — HIGH (ref 43–77)
PHOSPHATE SERPL-MCNC: 3.2 MG/DL — SIGNIFICANT CHANGE UP (ref 2.5–4.5)
PLATELET # BLD AUTO: 159 K/UL — SIGNIFICANT CHANGE UP (ref 150–400)
POTASSIUM SERPL-MCNC: 4.7 MMOL/L — SIGNIFICANT CHANGE UP (ref 3.5–5.3)
POTASSIUM SERPL-SCNC: 4.7 MMOL/L — SIGNIFICANT CHANGE UP (ref 3.5–5.3)
PROTHROM AB SERPL-ACNC: 28.5 SEC — HIGH (ref 9.5–13)
RBC # BLD: 4.62 M/UL — SIGNIFICANT CHANGE UP (ref 3.8–5.2)
RBC # FLD: 17.6 % — HIGH (ref 10.3–14.5)
SODIUM SERPL-SCNC: 141 MMOL/L — SIGNIFICANT CHANGE UP (ref 135–145)
WBC # BLD: 13.19 K/UL — HIGH (ref 3.8–10.5)
WBC # FLD AUTO: 13.19 K/UL — HIGH (ref 3.8–10.5)

## 2023-10-16 PROCEDURE — 80048 BASIC METABOLIC PNL TOTAL CA: CPT

## 2023-10-16 PROCEDURE — 36415 COLL VENOUS BLD VENIPUNCTURE: CPT

## 2023-10-16 PROCEDURE — 85025 COMPLETE CBC W/AUTO DIFF WBC: CPT

## 2023-10-16 PROCEDURE — 83036 HEMOGLOBIN GLYCOSYLATED A1C: CPT

## 2023-10-16 PROCEDURE — 85730 THROMBOPLASTIN TIME PARTIAL: CPT

## 2023-10-16 PROCEDURE — 86900 BLOOD TYPING SEROLOGIC ABO: CPT

## 2023-10-16 PROCEDURE — 99214 OFFICE O/P EST MOD 30 MIN: CPT | Mod: 25

## 2023-10-16 PROCEDURE — 86901 BLOOD TYPING SEROLOGIC RH(D): CPT

## 2023-10-16 PROCEDURE — 84100 ASSAY OF PHOSPHORUS: CPT

## 2023-10-16 PROCEDURE — 85610 PROTHROMBIN TIME: CPT

## 2023-10-16 PROCEDURE — 86850 RBC ANTIBODY SCREEN: CPT

## 2023-10-16 PROCEDURE — 83735 ASSAY OF MAGNESIUM: CPT

## 2023-10-16 RX ORDER — CELECOXIB 200 MG/1
400 CAPSULE ORAL ONCE
Refills: 0 | Status: DISCONTINUED | OUTPATIENT
Start: 2023-10-27 | End: 2023-10-27

## 2023-10-16 RX ORDER — HEPARIN SODIUM 5000 [USP'U]/ML
5000 INJECTION INTRAVENOUS; SUBCUTANEOUS ONCE
Refills: 0 | Status: DISCONTINUED | OUTPATIENT
Start: 2023-10-27 | End: 2023-10-27

## 2023-10-16 RX ORDER — ACETAMINOPHEN 500 MG
1000 TABLET ORAL ONCE
Refills: 0 | Status: DISCONTINUED | OUTPATIENT
Start: 2023-10-27 | End: 2023-10-27

## 2023-10-16 RX ORDER — GABAPENTIN 400 MG/1
600 CAPSULE ORAL ONCE
Refills: 0 | Status: DISCONTINUED | OUTPATIENT
Start: 2023-10-27 | End: 2023-10-27

## 2023-10-16 RX ORDER — MYCOPHENOLATE MOFETIL 250 MG/1
1 CAPSULE ORAL
Refills: 0 | DISCHARGE

## 2023-10-16 NOTE — H&P PST ADULT - NSICDXPROCEDURE_GEN_ALL_CORE_FT
PROCEDURES:  Laparoscopic total hysterectomy with cystoscopy 16-Oct-2023 08:56:29  Selina Caldwell

## 2023-10-16 NOTE — H&P PST ADULT - NSICDXPASTMEDICALHX_GEN_ALL_CORE_FT
PAST MEDICAL HISTORY:  Chronic anticoagulation     CKD (chronic kidney disease)     DVT, lower extremity left leg 2012, resolved    Encounter for screening for malignant neoplasm of colon     Gout     Heart burn     Hypertension     Iron deficiency anemia     LGSIL on Pap smear of cervix     Long-term use of immunosuppressant medication     Lupus nephritis     Obesity     Rash due to systemic lupus erythematosus (SLE)     Shingles     SLE (systemic lupus erythematosus)      PAST MEDICAL HISTORY:  Chronic anticoagulation     CKD (chronic kidney disease)     DVT, lower extremity left leg 2012, resolved    Encounter for screening for malignant neoplasm of colon     Gout     Heart burn     Hypertension     Iron deficiency anemia     LGSIL on Pap smear of cervix     Long-term use of immunosuppressant medication     Lupus nephritis     Menorrhagia     Obesity     Rash due to systemic lupus erythematosus (SLE)     Shingles     SLE (systemic lupus erythematosus)

## 2023-10-16 NOTE — H&P PST ADULT - ATTENDING COMMENTS
The patient was seen in the presurgical holding area for the informed consent discussion.  R/B/A were reviewed & understood including but not   limited to bleeding, infection (increased risk in setting of immune suppression), injury to surrounding organs, and thromboembolic events.  She verbalized an understanding and provided written consent for the surgery as delineated.

## 2023-10-16 NOTE — H&P PST ADULT - LAST STRESS TEST
"  Subjective:  Postop day #1 laparoscopic cholecystectomy and normal interoperative cholangiogram.  Also resolving pancreatitis.  Patient feels better.  He is tolerating regular diet.     /70 (BP Location: Left arm, Patient Position: Lying)   Pulse 60   Temp 97.2 °F (36.2 °C) (Oral)   Resp 18   Ht 177.8 cm (70\")   Wt 70.1 kg (154 lb 9.6 oz)   SpO2 95%   BMI 22.18 kg/m²     Lab Results (last 24 hours)     Procedure Component Value Units Date/Time    Potassium [[id number]]  (Normal) Collected:  06/19/20 0609    Specimen:  Blood Updated:  06/19/20 0649     Potassium 4.5 mmol/L     Comprehensive Metabolic Panel [[id number]]  (Abnormal) Collected:  06/19/20 0609    Specimen:  Blood Updated:  06/19/20 0649     Glucose 122 mg/dL      BUN 18 mg/dL      Creatinine 0.92 mg/dL      Sodium 137 mmol/L      Potassium 4.5 mmol/L      Chloride 110 mmol/L      CO2 19.0 mmol/L      Calcium 8.4 mg/dL      Total Protein 6.0 g/dL      Albumin 3.10 g/dL      ALT (SGPT) 46 U/L      AST (SGOT) 59 U/L      Alkaline Phosphatase 194 U/L      Total Bilirubin 1.3 mg/dL      eGFR Non African Amer 79 mL/min/1.73      Globulin 2.9 gm/dL      A/G Ratio 1.1 g/dL      BUN/Creatinine Ratio 19.6     Anion Gap 8.0 mmol/L     Narrative:       GFR Normal >60  Chronic Kidney Disease <60  Kidney Failure <15      CBC & Differential [[id number]] Collected:  06/19/20 0608    Specimen:  Blood Updated:  06/19/20 0638    Narrative:       The following orders were created for panel order CBC & Differential.  Procedure                               Abnormality         Status                     ---------                               -----------         ------                     CBC Auto Differential[[id number]]        Abnormal            Final result                 Please view results for these tests on the individual orders.    CBC Auto Differential [[id number]]  (Abnormal) Collected:  06/19/20 0608    Specimen:  Blood Updated:  06/19/20 0638     WBC " 7.01 10*3/mm3      RBC 3.63 10*6/mm3      Hemoglobin 11.2 g/dL      Hematocrit 33.4 %      MCV 92.0 fL      MCH 30.9 pg      MCHC 33.5 g/dL      RDW 13.1 %      RDW-SD 44.1 fl      MPV 10.9 fL      Platelets 146 10*3/mm3      Neutrophil % 81.0 %      Lymphocyte % 11.6 %      Monocyte % 6.6 %      Eosinophil % 0.0 %      Basophil % 0.1 %      Immature Grans % 0.7 %      Neutrophils, Absolute 5.68 10*3/mm3      Lymphocytes, Absolute 0.81 10*3/mm3      Monocytes, Absolute 0.46 10*3/mm3      Eosinophils, Absolute 0.00 10*3/mm3      Basophils, Absolute 0.01 10*3/mm3      Immature Grans, Absolute 0.05 10*3/mm3      nRBC 0.0 /100 WBC     Blood Culture - Blood, Arm, Right [[id number]] Collected:  06/15/20 1511    Specimen:  Blood from Arm, Right Updated:  06/18/20 1530     Blood Culture No growth at 3 days    Blood Culture - Blood, Arm, Right [[id number]] Collected:  06/15/20 1318    Specimen:  Blood from Arm, Right Updated:  06/18/20 1330     Blood Culture No growth at 3 days    Tissue Pathology Exam [[id number]] Collected:  06/18/20 1121    Specimen:  Tissue from Gallbladder Updated:  06/18/20 1315          Current Medications:  Current Facility-Administered Medications   Medication Dose Route Frequency Provider Last Rate Last Dose   • bisacodyl (DULCOLAX) EC tablet 5 mg  5 mg Oral Daily PRN Kirby Miramontes MD       • bisacodyl (DULCOLAX) suppository 10 mg  10 mg Rectal Daily PRN Kirby Miramontes MD       • calcium carbonate (TUMS) chewable tablet 500 mg (200 mg elemental)  2 tablet Oral BID PRN Kirby Miramontes MD       • famotidine (PEPCID) tablet 40 mg  40 mg Oral Daily Kirby Miramontes MD   40 mg at 06/17/20 0858   • hydrALAZINE (APRESOLINE) injection 10 mg  10 mg Intravenous Q6H PRN Kirby Miramontes MD   10 mg at 06/18/20 0309   • hydrALAZINE (APRESOLINE) injection 5 mg  5 mg Intravenous Once Atcher, Vaibhav, CRNA       • HYDROcodone-acetaminophen (NORCO) 7.5-325 MG per tablet 1 tablet  1 tablet Oral Q6H PRN  Kirby Miramontes MD       • HYDROmorphone (DILAUDID) injection 0.5 mg  0.5 mg Intravenous Q4H PRN Kirby Miramontes MD   0.5 mg at 06/18/20 2303   • magnesium hydroxide (MILK OF MAGNESIA) suspension 2400 mg/10mL 10 mL  10 mL Oral Daily PRN Kirby Miramontes MD       • melatonin tablet 5.25 mg  5.25 mg Oral Nightly PRN Kirby Miramontes MD       • metoprolol tartrate (LOPRESSOR) tablet 100 mg  100 mg Oral Q12H Kirby Miramontes MD   100 mg at 06/18/20 2126   • ondansetron (ZOFRAN) tablet 4 mg  4 mg Oral Q6H PRN Kirby Miramontes MD        Or   • ondansetron (ZOFRAN) injection 4 mg  4 mg Intravenous Q6H PRN Kirby Miramontes MD       • Pharmacy to Dose Zosyn   Does not apply Continuous PRN Kirby Miramontes MD       • potassium chloride (KLOR-CON) packet 40 mEq  40 mEq Oral PRN Atiya Duran APRN   40 mEq at 06/18/20 2126   • potassium chloride (MICRO-K) CR capsule 40 mEq  40 mEq Oral PRN Atiya Duran APRN   40 mEq at 06/19/20 0322   • sodium chloride 0.9 % flush 10 mL  10 mL Intravenous Q12H Kirby Miramontes MD   10 mL at 06/18/20 2131   • sodium chloride 0.9 % flush 10 mL  10 mL Intravenous PRN Kirby Miramontes MD       • sodium chloride 0.9 % infusion  100 mL/hr Intravenous Continuous Kirby Miramontes  mL/hr at 06/19/20 0029 100 mL/hr at 06/19/20 0029       Prior to admission medications:  Medications Prior to Admission   Medication Sig Dispense Refill Last Dose   • furosemide (LASIX) 20 MG tablet Take 20 mg by mouth Daily As Needed. 1/2 tablet prn for leg swelling      • metoprolol tartrate (LOPRESSOR) 100 MG tablet Take 100 mg by mouth 2 (Two) Times a Day.      • vitamin B-12 (CYANOCOBALAMIN) 500 MCG tablet Take 500 mcg by mouth Daily.          Physical exam: Nonicteric  Abdomen soft  Nontoxic    Assessment : Doing well postop from lap santa.    Plan:   Discharge as per medicine service and GI regarding pancreatitis.  If discharged should see me in the office in 1 week.             none

## 2023-10-16 NOTE — H&P PST ADULT - HISTORY OF PRESENT ILLNESS
53 y/o female with hx of DVT ( currently on coumadin) , Lupus nephritis, HTN, Gout presents to [facility] for scheduled dilation and curettage, cone biopsy on 5/26/23. Patient with abnormal pap smear over the last few years and has completed leep 1/2023 with inconclusive results. Patient advised for repeat procedure.  52 y/o female with menorrhagia and abnormal pap smear, PMH of DVT ( currently on coumadin) , Lupus nephritis, HTN, Gout presents to PST for scheduled Robot assisted laparoscopic hysterectomy, bilateral salpingectomy. Patient reports excessive menstrual bleeding, she denies pelvic pain.  52 y/o female with menorrhagia and abnormal pap smear, PMH of DVT (currently on coumadin) , Lupus nephritis, HTN, Gout presents to PST for scheduled Robot assisted laparoscopic hysterectomy, bilateral salpingectomy. Patient reports excessive menstrual bleeding, she denies pelvic pain.

## 2023-10-16 NOTE — H&P PST ADULT - ASSESSMENT
54 y/o female with menorrhagia and abnormal pap smear, PMH of DVT ( currently on coumadin) , Lupus nephritis, HTN, Gout. She is scheduled for Robot assisted laparoscopic hysterectomy, bilateral salpingectomy.   Plan  1. Stop all NSAIDS, herbal supplements and vitamins for 7 days.  2. NPO as per ASU instructions.  3. Take the following medications (Prednisone, Cellcept, Irbesartan, Nebivola ) with small sips of water on the morning of your procedure/surgery.  4. Use EZ sponges as directed  5. Labs as per surgeon. EKG on chart.  6. PMD visit for optimization prior to surgery as per surgeon  7. Pt instructed to follow preop Warfarin instructions from PMD.  8. Pt instructed to hold Wegovy one week before surgery.  9. STAT PT/INR and urine pregnancy test on day of admission.         54 y/o female with menorrhagia and abnormal pap smear, PMH of DVT ( currently on coumadin) , Lupus nephritis, HTN, Gout. She is scheduled for Robot assisted laparoscopic hysterectomy, bilateral salpingectomy.   Plan  1. Stop all NSAIDS, herbal supplements and vitamins for 7 days.  2. NPO as per ASU instructions.  3. Take the following medications (Prednisone, Cellcept, Irbesartan, Nebivola ) with small sips of water on the morning of your procedure/surgery.  4. Use EZ sponges as directed  5. Labs as per surgeon. EKG on chart.  6. PMD visit for optimization prior to surgery as per surgeon  7. Pt instructed to follow preop Warfarin instructions from PMD.  8. Pt instructed to hold Wegovy one week before surgery.  9. STAT PT/INR and urine pregnancy test on day of admission.  10. Gyne preop ERP orders entered.         52 y/o female with menorrhagia and abnormal pap smear, PMH of DVT ( currently on coumadin) , Lupus nephritis, HTN, Gout. She is scheduled for Robot assisted laparoscopic hysterectomy, bilateral salpingectomy.   Plan  1. Stop all NSAIDS, herbal supplements and vitamins for 7 days.  2. NPO as per ASU instructions.  3. Take the following medications (Prednisone, Cellcept, Irbesartan, Nebivolol ) with small sips of water on the morning of your procedure/surgery.  4. Use EZ sponges as directed  5. Labs as per surgeon. EKG on chart.  6. PMD visit for optimization prior to surgery as per surgeon  7. Pt instructed to follow preop Warfarin instructions from PMD.  8. Pt instructed to hold Wegovy one week before surgery.  9. STAT PT/INR and urine pregnancy test on day of admission.  10. Gyne preop ERP orders entered.

## 2023-10-17 DIAGNOSIS — Z01.818 ENCOUNTER FOR OTHER PREPROCEDURAL EXAMINATION: ICD-10-CM

## 2023-10-27 ENCOUNTER — TRANSCRIPTION ENCOUNTER (OUTPATIENT)
Age: 53
End: 2023-10-27

## 2023-10-27 ENCOUNTER — RESULT REVIEW (OUTPATIENT)
Age: 53
End: 2023-10-27

## 2023-10-27 ENCOUNTER — OUTPATIENT (OUTPATIENT)
Dept: INPATIENT UNIT | Facility: HOSPITAL | Age: 53
LOS: 1 days | Discharge: HOME | End: 2023-10-27
Payer: COMMERCIAL

## 2023-10-27 VITALS
RESPIRATION RATE: 16 BRPM | DIASTOLIC BLOOD PRESSURE: 87 MMHG | HEIGHT: 68 IN | TEMPERATURE: 98 F | HEART RATE: 71 BPM | OXYGEN SATURATION: 100 % | SYSTOLIC BLOOD PRESSURE: 135 MMHG | WEIGHT: 274.92 LBS

## 2023-10-27 VITALS
DIASTOLIC BLOOD PRESSURE: 82 MMHG | TEMPERATURE: 98 F | OXYGEN SATURATION: 100 % | HEART RATE: 78 BPM | RESPIRATION RATE: 15 BRPM | SYSTOLIC BLOOD PRESSURE: 128 MMHG

## 2023-10-27 DIAGNOSIS — N92.0 EXCESSIVE AND FREQUENT MENSTRUATION WITH REGULAR CYCLE: ICD-10-CM

## 2023-10-27 DIAGNOSIS — D36.9 BENIGN NEOPLASM, UNSPECIFIED SITE: Chronic | ICD-10-CM

## 2023-10-27 DIAGNOSIS — M10.9 GOUT, UNSPECIFIED: ICD-10-CM

## 2023-10-27 DIAGNOSIS — N87.9 DYSPLASIA OF CERVIX UTERI, UNSPECIFIED: ICD-10-CM

## 2023-10-27 DIAGNOSIS — N18.9 CHRONIC KIDNEY DISEASE, UNSPECIFIED: ICD-10-CM

## 2023-10-27 DIAGNOSIS — Z88.8 ALLERGY STATUS TO OTHER DRUGS, MEDICAMENTS AND BIOLOGICAL SUBSTANCES: ICD-10-CM

## 2023-10-27 DIAGNOSIS — M32.14 GLOMERULAR DISEASE IN SYSTEMIC LUPUS ERYTHEMATOSUS: ICD-10-CM

## 2023-10-27 DIAGNOSIS — E66.9 OBESITY, UNSPECIFIED: ICD-10-CM

## 2023-10-27 DIAGNOSIS — Z98.890 OTHER SPECIFIED POSTPROCEDURAL STATES: Chronic | ICD-10-CM

## 2023-10-27 DIAGNOSIS — N93.9 ABNORMAL UTERINE AND VAGINAL BLEEDING, UNSPECIFIED: ICD-10-CM

## 2023-10-27 DIAGNOSIS — Z86.718 PERSONAL HISTORY OF OTHER VENOUS THROMBOSIS AND EMBOLISM: ICD-10-CM

## 2023-10-27 DIAGNOSIS — N83.8 OTHER NONINFLAMMATORY DISORDERS OF OVARY, FALLOPIAN TUBE AND BROAD LIGAMENT: ICD-10-CM

## 2023-10-27 DIAGNOSIS — N87.0 MILD CERVICAL DYSPLASIA: ICD-10-CM

## 2023-10-27 DIAGNOSIS — I12.9 HYPERTENSIVE CHRONIC KIDNEY DISEASE WITH STAGE 1 THROUGH STAGE 4 CHRONIC KIDNEY DISEASE, OR UNSPECIFIED CHRONIC KIDNEY DISEASE: ICD-10-CM

## 2023-10-27 DIAGNOSIS — Z79.52 LONG TERM (CURRENT) USE OF SYSTEMIC STEROIDS: ICD-10-CM

## 2023-10-27 DIAGNOSIS — D63.1 ANEMIA IN CHRONIC KIDNEY DISEASE: ICD-10-CM

## 2023-10-27 DIAGNOSIS — N05.8 UNSPECIFIED NEPHRITIC SYNDROME WITH OTHER MORPHOLOGIC CHANGES: ICD-10-CM

## 2023-10-27 DIAGNOSIS — Z79.01 LONG TERM (CURRENT) USE OF ANTICOAGULANTS: ICD-10-CM

## 2023-10-27 LAB
ANION GAP SERPL CALC-SCNC: 5 MMOL/L — SIGNIFICANT CHANGE UP (ref 5–17)
ANION GAP SERPL CALC-SCNC: 5 MMOL/L — SIGNIFICANT CHANGE UP (ref 5–17)
BASOPHILS # BLD AUTO: 0.03 K/UL — SIGNIFICANT CHANGE UP (ref 0–0.2)
BASOPHILS # BLD AUTO: 0.03 K/UL — SIGNIFICANT CHANGE UP (ref 0–0.2)
BASOPHILS NFR BLD AUTO: 0.2 % — SIGNIFICANT CHANGE UP (ref 0–2)
BASOPHILS NFR BLD AUTO: 0.2 % — SIGNIFICANT CHANGE UP (ref 0–2)
BUN SERPL-MCNC: 34 MG/DL — HIGH (ref 7–23)
BUN SERPL-MCNC: 34 MG/DL — HIGH (ref 7–23)
CALCIUM SERPL-MCNC: 8.9 MG/DL — SIGNIFICANT CHANGE UP (ref 8.5–10.1)
CALCIUM SERPL-MCNC: 8.9 MG/DL — SIGNIFICANT CHANGE UP (ref 8.5–10.1)
CHLORIDE SERPL-SCNC: 115 MMOL/L — HIGH (ref 96–108)
CHLORIDE SERPL-SCNC: 115 MMOL/L — HIGH (ref 96–108)
CO2 SERPL-SCNC: 21 MMOL/L — LOW (ref 22–31)
CO2 SERPL-SCNC: 21 MMOL/L — LOW (ref 22–31)
CREAT SERPL-MCNC: 1.8 MG/DL — HIGH (ref 0.5–1.3)
CREAT SERPL-MCNC: 1.8 MG/DL — HIGH (ref 0.5–1.3)
EGFR: 34 ML/MIN/1.73M2 — LOW
EGFR: 34 ML/MIN/1.73M2 — LOW
EOSINOPHIL # BLD AUTO: 0 K/UL — SIGNIFICANT CHANGE UP (ref 0–0.5)
EOSINOPHIL # BLD AUTO: 0 K/UL — SIGNIFICANT CHANGE UP (ref 0–0.5)
EOSINOPHIL NFR BLD AUTO: 0 % — SIGNIFICANT CHANGE UP (ref 0–6)
EOSINOPHIL NFR BLD AUTO: 0 % — SIGNIFICANT CHANGE UP (ref 0–6)
GLUCOSE BLDC GLUCOMTR-MCNC: 115 MG/DL — HIGH (ref 70–99)
GLUCOSE BLDC GLUCOMTR-MCNC: 115 MG/DL — HIGH (ref 70–99)
GLUCOSE BLDC GLUCOMTR-MCNC: 90 MG/DL — SIGNIFICANT CHANGE UP (ref 70–99)
GLUCOSE BLDC GLUCOMTR-MCNC: 90 MG/DL — SIGNIFICANT CHANGE UP (ref 70–99)
GLUCOSE SERPL-MCNC: 86 MG/DL — SIGNIFICANT CHANGE UP (ref 70–99)
GLUCOSE SERPL-MCNC: 86 MG/DL — SIGNIFICANT CHANGE UP (ref 70–99)
HCG UR QL: NEGATIVE — SIGNIFICANT CHANGE UP
HCG UR QL: NEGATIVE — SIGNIFICANT CHANGE UP
HCT VFR BLD CALC: 34.5 % — SIGNIFICANT CHANGE UP (ref 34.5–45)
HCT VFR BLD CALC: 34.5 % — SIGNIFICANT CHANGE UP (ref 34.5–45)
HGB BLD-MCNC: 11.4 G/DL — LOW (ref 11.5–15.5)
HGB BLD-MCNC: 11.4 G/DL — LOW (ref 11.5–15.5)
IMM GRANULOCYTES NFR BLD AUTO: 0.5 % — SIGNIFICANT CHANGE UP (ref 0–0.9)
IMM GRANULOCYTES NFR BLD AUTO: 0.5 % — SIGNIFICANT CHANGE UP (ref 0–0.9)
INR BLD: 1.47 RATIO — HIGH (ref 0.85–1.18)
INR BLD: 1.47 RATIO — HIGH (ref 0.85–1.18)
LYMPHOCYTES # BLD AUTO: 0.96 K/UL — LOW (ref 1–3.3)
LYMPHOCYTES # BLD AUTO: 0.96 K/UL — LOW (ref 1–3.3)
LYMPHOCYTES # BLD AUTO: 5 % — LOW (ref 13–44)
LYMPHOCYTES # BLD AUTO: 5 % — LOW (ref 13–44)
MCHC RBC-ENTMCNC: 26.3 PG — LOW (ref 27–34)
MCHC RBC-ENTMCNC: 26.3 PG — LOW (ref 27–34)
MCHC RBC-ENTMCNC: 33 GM/DL — SIGNIFICANT CHANGE UP (ref 32–36)
MCHC RBC-ENTMCNC: 33 GM/DL — SIGNIFICANT CHANGE UP (ref 32–36)
MCV RBC AUTO: 79.7 FL — LOW (ref 80–100)
MCV RBC AUTO: 79.7 FL — LOW (ref 80–100)
MONOCYTES # BLD AUTO: 0.59 K/UL — SIGNIFICANT CHANGE UP (ref 0–0.9)
MONOCYTES # BLD AUTO: 0.59 K/UL — SIGNIFICANT CHANGE UP (ref 0–0.9)
MONOCYTES NFR BLD AUTO: 3.1 % — SIGNIFICANT CHANGE UP (ref 2–14)
MONOCYTES NFR BLD AUTO: 3.1 % — SIGNIFICANT CHANGE UP (ref 2–14)
NEUTROPHILS # BLD AUTO: 17.42 K/UL — HIGH (ref 1.8–7.4)
NEUTROPHILS # BLD AUTO: 17.42 K/UL — HIGH (ref 1.8–7.4)
NEUTROPHILS NFR BLD AUTO: 91.2 % — HIGH (ref 43–77)
NEUTROPHILS NFR BLD AUTO: 91.2 % — HIGH (ref 43–77)
PLATELET # BLD AUTO: 163 K/UL — SIGNIFICANT CHANGE UP (ref 150–400)
PLATELET # BLD AUTO: 163 K/UL — SIGNIFICANT CHANGE UP (ref 150–400)
POTASSIUM SERPL-MCNC: 4.4 MMOL/L — SIGNIFICANT CHANGE UP (ref 3.5–5.3)
POTASSIUM SERPL-MCNC: 4.4 MMOL/L — SIGNIFICANT CHANGE UP (ref 3.5–5.3)
POTASSIUM SERPL-SCNC: 4.4 MMOL/L — SIGNIFICANT CHANGE UP (ref 3.5–5.3)
POTASSIUM SERPL-SCNC: 4.4 MMOL/L — SIGNIFICANT CHANGE UP (ref 3.5–5.3)
PROTHROM AB SERPL-ACNC: 16.4 SEC — HIGH (ref 9.5–13)
PROTHROM AB SERPL-ACNC: 16.4 SEC — HIGH (ref 9.5–13)
RBC # BLD: 4.33 M/UL — SIGNIFICANT CHANGE UP (ref 3.8–5.2)
RBC # BLD: 4.33 M/UL — SIGNIFICANT CHANGE UP (ref 3.8–5.2)
RBC # FLD: 17.3 % — HIGH (ref 10.3–14.5)
RBC # FLD: 17.3 % — HIGH (ref 10.3–14.5)
SODIUM SERPL-SCNC: 141 MMOL/L — SIGNIFICANT CHANGE UP (ref 135–145)
SODIUM SERPL-SCNC: 141 MMOL/L — SIGNIFICANT CHANGE UP (ref 135–145)
WBC # BLD: 19.1 K/UL — HIGH (ref 3.8–10.5)
WBC # BLD: 19.1 K/UL — HIGH (ref 3.8–10.5)
WBC # FLD AUTO: 19.1 K/UL — HIGH (ref 3.8–10.5)
WBC # FLD AUTO: 19.1 K/UL — HIGH (ref 3.8–10.5)

## 2023-10-27 PROCEDURE — 58571 TLH W/T/O 250 G OR LESS: CPT | Mod: 80

## 2023-10-27 PROCEDURE — 58571 TLH W/T/O 250 G OR LESS: CPT

## 2023-10-27 PROCEDURE — 82962 GLUCOSE BLOOD TEST: CPT

## 2023-10-27 PROCEDURE — 88309 TISSUE EXAM BY PATHOLOGIST: CPT

## 2023-10-27 PROCEDURE — 85025 COMPLETE CBC W/AUTO DIFF WBC: CPT

## 2023-10-27 PROCEDURE — 81025 URINE PREGNANCY TEST: CPT

## 2023-10-27 PROCEDURE — 88342 IMHCHEM/IMCYTCHM 1ST ANTB: CPT | Mod: 26

## 2023-10-27 PROCEDURE — 85610 PROTHROMBIN TIME: CPT

## 2023-10-27 PROCEDURE — 88309 TISSUE EXAM BY PATHOLOGIST: CPT | Mod: 26

## 2023-10-27 PROCEDURE — S2900: CPT

## 2023-10-27 PROCEDURE — 36415 COLL VENOUS BLD VENIPUNCTURE: CPT

## 2023-10-27 PROCEDURE — C1765: CPT

## 2023-10-27 PROCEDURE — 80048 BASIC METABOLIC PNL TOTAL CA: CPT

## 2023-10-27 PROCEDURE — 88342 IMHCHEM/IMCYTCHM 1ST ANTB: CPT

## 2023-10-27 RX ORDER — HYDROCORTISONE 20 MG
100 TABLET ORAL ONCE
Refills: 0 | Status: DISCONTINUED | OUTPATIENT
Start: 2023-10-27 | End: 2023-10-27

## 2023-10-27 RX ORDER — WARFARIN SODIUM 2.5 MG/1
1 TABLET ORAL
Refills: 0 | DISCHARGE

## 2023-10-27 RX ORDER — FENTANYL CITRATE 50 UG/ML
50 INJECTION INTRAVENOUS
Refills: 0 | Status: DISCONTINUED | OUTPATIENT
Start: 2023-10-27 | End: 2023-10-27

## 2023-10-27 RX ORDER — SEMAGLUTIDE 0.68 MG/ML
1 INJECTION, SOLUTION SUBCUTANEOUS
Refills: 0 | DISCHARGE

## 2023-10-27 RX ORDER — MYCOPHENOLATE MOFETIL 250 MG/1
2 CAPSULE ORAL
Refills: 0 | DISCHARGE

## 2023-10-27 RX ORDER — ACETAMINOPHEN 500 MG
2 TABLET ORAL
Qty: 30 | Refills: 0
Start: 2023-10-27 | End: 2023-10-31

## 2023-10-27 RX ORDER — ENOXAPARIN SODIUM 100 MG/ML
40 INJECTION SUBCUTANEOUS
Qty: 5 | Refills: 0
Start: 2023-10-27

## 2023-10-27 RX ORDER — CHOLECALCIFEROL (VITAMIN D3) 125 MCG
1 CAPSULE ORAL
Qty: 0 | Refills: 0 | DISCHARGE

## 2023-10-27 RX ORDER — HYDROXYCHLOROQUINE SULFATE 200 MG
1 TABLET ORAL
Qty: 0 | Refills: 0 | DISCHARGE

## 2023-10-27 RX ORDER — OXYCODONE HYDROCHLORIDE 5 MG/1
5 TABLET ORAL ONCE
Refills: 0 | Status: DISCONTINUED | OUTPATIENT
Start: 2023-10-27 | End: 2023-10-27

## 2023-10-27 RX ORDER — NEBIVOLOL HYDROCHLORIDE 5 MG/1
1 TABLET ORAL
Refills: 0 | DISCHARGE

## 2023-10-27 RX ORDER — IRBESARTAN 75 MG/1
1 TABLET ORAL
Qty: 0 | Refills: 0 | DISCHARGE

## 2023-10-27 RX ORDER — FEBUXOSTAT 40 MG/1
1 TABLET ORAL
Qty: 0 | Refills: 0 | DISCHARGE

## 2023-10-27 NOTE — BRIEF OPERATIVE NOTE - NSICDXBRIEFPROCEDURE_GEN_ALL_CORE_FT
PROCEDURES:  Robot-assisted laparoscopic hysterectomy with salpingectomy and cystoscopy using da Leila Xi 27-Oct-2023 15:54:01  Benito Friend

## 2023-10-27 NOTE — ASU DISCHARGE PLAN (ADULT/PEDIATRIC) - CARE PROVIDER_API CALL
Echo Kim  Gynecologic Oncology  [address] [place] [address]  Phone: [phone]  Fax: [phone]  Follow Up Time:

## 2023-10-27 NOTE — ASU DISCHARGE PLAN (ADULT/PEDIATRIC) - PROCEDURE
Pelvic examination under anesthesia, robotic assisted total laparoscopic hysterectomy, bilateral salpingectomy, cystoscopy

## 2023-10-27 NOTE — ASU PATIENT PROFILE, ADULT - FALL HARM RISK - UNIVERSAL INTERVENTIONS
Bed in lowest position, wheels locked, appropriate side rails in place/Call bell, personal items and telephone in reach/Instruct patient to call for assistance before getting out of bed or chair/Non-slip footwear when patient is out of bed/[place] to call system/Physically safe environment - no spills, clutter or unnecessary equipment/Purposeful Proactive Rounding/Room/bathroom lighting operational, light cord in reach

## 2023-10-27 NOTE — ASU DISCHARGE PLAN (ADULT/PEDIATRIC) - NOTHING PER VAGINA DURATION
May walk and climb stairs as often as you'd like, no vigorous activity, do not lift anything greater than 10lbs, nothing per vagina x 8 weeks, do not drive while on pain medication.

## 2023-10-27 NOTE — BRIEF OPERATIVE NOTE - NSICDXBRIEFPOSTOP_GEN_ALL_CORE_FT
POST-OP DIAGNOSIS:  Abnormal uterine bleeding (AUB) 27-Oct-2023 15:54:49  Benito Friend  Cervical dysplasia 27-Oct-2023 15:54:56  Benito Friend

## 2023-10-27 NOTE — ASU PATIENT PROFILE, ADULT - NSICDXPASTMEDICALHX_GEN_ALL_CORE_FT
PAST MEDICAL HISTORY:  Chronic anticoagulation     CKD (chronic kidney disease)     DVT, lower extremity left leg 2012, resolved    Encounter for screening for malignant neoplasm of colon     Gout     Heart burn     Hypertension     Iron deficiency anemia     LGSIL on Pap smear of cervix     Long-term use of immunosuppressant medication     Lupus nephritis     Menorrhagia     Obesity     Rash due to systemic lupus erythematosus (SLE)     Shingles     SLE (systemic lupus erythematosus)

## 2023-10-27 NOTE — ASU DISCHARGE PLAN (ADULT/PEDIATRIC) - NS MD DC FALL RISK RISK
For information on Fall & Injury Prevention, visit: [email or web address] OR  [email or web address] OR  https://www.cdc.gov/steadi/patient.html

## 2023-10-27 NOTE — BRIEF OPERATIVE NOTE - NSICDXBRIEFPREOP_GEN_ALL_CORE_FT
PRE-OP DIAGNOSIS:  Abnormal uterine bleeding (AUB) 27-Oct-2023 15:54:10  Benito Friend  Cervical dysplasia 27-Oct-2023 15:54:41  Benito Friend

## 2023-10-27 NOTE — ASU DISCHARGE PLAN (ADULT/PEDIATRIC) - ASU DC SPECIAL INSTRUCTIONSFT
A PA will call you in 1 week to follow up on post operative recovery.  Follow up with Dr. Kim in 2-3 weeks in office.    Please take new prescription of Lovenox daily starting tonight, 10/27. You may resume your normal dose of Coumadin on Sunday, 10/29. Please have INR checked at [facility] laboratory on Monday and call office to let PAs know of results.    Continue the remainder of your home medications as prescribed. A PA will call you in 1 week to follow up on post operative recovery.  Follow up with Dr. Kim in 2-3 weeks in office.    Please take new prescription of Lovenox daily starting tonight, 10/27. You may resume your normal dose of Coumadin on Sunday, 10/29. Please have INR checked at [facility] laboratory on Wednesday and call office to let PAs know of results.    Continue the remainder of your home medications as prescribed.

## 2023-10-31 LAB
INR PPP: 1.54 RATIO
PT BLD: 17.2 SEC

## 2023-10-31 RX ORDER — ENOXAPARIN SODIUM 40 MG/.4ML
40 INJECTION, SOLUTION SUBCUTANEOUS DAILY
Qty: 3 | Refills: 0 | Status: ACTIVE | COMMUNITY
Start: 2023-10-31 | End: 1900-01-01

## 2023-11-02 PROBLEM — N92.0 EXCESSIVE AND FREQUENT MENSTRUATION WITH REGULAR CYCLE: Chronic | Status: ACTIVE | Noted: 2023-10-16

## 2023-11-03 LAB
INR PPP: 1.93 RATIO
PT BLD: 21.4 SEC

## 2023-11-06 ENCOUNTER — RX RENEWAL (OUTPATIENT)
Age: 53
End: 2023-11-06

## 2023-11-06 LAB
INR PPP: 1.59 RATIO
PT BLD: 17.7 SEC

## 2023-11-06 RX ORDER — MYCOPHENOLATE MOFETIL 500 MG/1
500 TABLET ORAL
Qty: 450 | Refills: 3 | Status: ACTIVE | COMMUNITY
Start: 2021-04-02 | End: 1900-01-01

## 2023-11-08 ENCOUNTER — NON-APPOINTMENT (OUTPATIENT)
Age: 53
End: 2023-11-08

## 2023-11-08 LAB
SURGICAL PATHOLOGY STUDY: SIGNIFICANT CHANGE UP
SURGICAL PATHOLOGY STUDY: SIGNIFICANT CHANGE UP

## 2023-11-15 ENCOUNTER — APPOINTMENT (OUTPATIENT)
Dept: GYNECOLOGIC ONCOLOGY | Facility: CLINIC | Age: 53
End: 2023-11-15
Payer: COMMERCIAL

## 2023-11-15 VITALS
OXYGEN SATURATION: 96 % | TEMPERATURE: 97.6 F | WEIGHT: 273 LBS | BODY MASS INDEX: 41.37 KG/M2 | SYSTOLIC BLOOD PRESSURE: 110 MMHG | HEIGHT: 68 IN | HEART RATE: 78 BPM | DIASTOLIC BLOOD PRESSURE: 70 MMHG

## 2023-11-15 PROCEDURE — 99024 POSTOP FOLLOW-UP VISIT: CPT

## 2023-11-16 LAB
INR PPP: 2.23 RATIO
PT BLD: 24.8 SEC

## 2023-11-30 NOTE — ASU DISCHARGE PLAN (ADULT/PEDIATRIC) - SIGNS AND SYMPTOMS OF INFECTION: FEVER, REDNESS, SWELLING, FOUL SMELLING DISCHARGE
-- DO NOT REPLY / DO NOT REPLY ALL --  -- Message is from Engagement Center Operations (ECO) --    Offered Waitlist if Available for the Visit Type? No    Caller is requesting an appointment - at a sooner time than what was available.      Caller wants sooner appointment - offered other approved options    Reason for Visit: Establish Care/Post ER, 11-, [facility], Back Pain    Is the patient currently scheduled? No    Preferred time to be seen: Within 3 days     Caller Information       Type Contact Phone/Fax    11/29/2023 09:51 PM CST Phone (Incoming) Fabien Randolph (Self) [phone] (M)          Alternative phone number: NONE    Can a detailed message be left? Yes    Message Turnaround: [place]:    Refer to site's KB page for routing instructions    Please give this turnaround time to the caller:   \"You can expect to receive a response 2-3 business days after your provider's clinical team reviews the message\"   Statement Selected

## 2023-12-26 ENCOUNTER — APPOINTMENT (OUTPATIENT)
Dept: RHEUMATOLOGY | Facility: CLINIC | Age: 53
End: 2023-12-26
Payer: COMMERCIAL

## 2023-12-26 VITALS
WEIGHT: 272 LBS | HEART RATE: 92 BPM | SYSTOLIC BLOOD PRESSURE: 138 MMHG | BODY MASS INDEX: 41.22 KG/M2 | OXYGEN SATURATION: 91 % | HEIGHT: 68 IN | DIASTOLIC BLOOD PRESSURE: 88 MMHG

## 2023-12-26 DIAGNOSIS — R21 RASH AND OTHER NONSPECIFIC SKIN ERUPTION: ICD-10-CM

## 2023-12-26 PROCEDURE — 99214 OFFICE O/P EST MOD 30 MIN: CPT

## 2023-12-26 RX ORDER — FUROSEMIDE 20 MG/1
20 TABLET ORAL DAILY
Qty: 30 | Refills: 3 | Status: ACTIVE | COMMUNITY
Start: 1900-01-01 | End: 1900-01-01

## 2023-12-27 LAB
ALBUMIN SERPL ELPH-MCNC: 4 G/DL
ALP BLD-CCNC: 129 U/L
ALT SERPL-CCNC: 33 U/L
ANION GAP SERPL CALC-SCNC: 13 MMOL/L
APPEARANCE: CLEAR
AST SERPL-CCNC: 28 U/L
BACTERIA: ABNORMAL /HPF
BASOPHILS # BLD AUTO: 0.04 K/UL
BASOPHILS NFR BLD AUTO: 0.3 %
BILIRUB SERPL-MCNC: 0.4 MG/DL
BILIRUBIN URINE: NEGATIVE
BLOOD URINE: ABNORMAL
BUN SERPL-MCNC: 17 MG/DL
C3 SERPL-MCNC: 117 MG/DL
C4 SERPL-MCNC: 15 MG/DL
CALCIUM SERPL-MCNC: 9.3 MG/DL
CAST: 2 /LPF
CHLORIDE SERPL-SCNC: 112 MMOL/L
CK SERPL-CCNC: 125 U/L
CO2 SERPL-SCNC: 18 MMOL/L
COLOR: YELLOW
CREAT SERPL-MCNC: 1.57 MG/DL
CREAT SPEC-SCNC: 187 MG/DL
CREAT/PROT UR: 0.6 RATIO
EGFR: 40 ML/MIN/1.73M2
EOSINOPHIL # BLD AUTO: 0.01 K/UL
EOSINOPHIL NFR BLD AUTO: 0.1 %
EPITHELIAL CELLS: 18 /HPF
GLUCOSE QUALITATIVE U: NEGATIVE MG/DL
HCT VFR BLD CALC: 36.5 %
HGB BLD-MCNC: 12 G/DL
HYALINE CASTS: PRESENT
IMM GRANULOCYTES NFR BLD AUTO: 0.5 %
INR PPP: 2.02 RATIO
KETONES URINE: NEGATIVE MG/DL
LEUKOCYTE ESTERASE URINE: ABNORMAL
LYMPHOCYTES # BLD AUTO: 1.08 K/UL
LYMPHOCYTES NFR BLD AUTO: 7.9 %
MAN DIFF?: NORMAL
MCHC RBC-ENTMCNC: 25.6 PG
MCHC RBC-ENTMCNC: 32.9 GM/DL
MCV RBC AUTO: 77.8 FL
MICROSCOPIC-UA: NORMAL
MONOCYTES # BLD AUTO: 0.58 K/UL
MONOCYTES NFR BLD AUTO: 4.3 %
NEUTROPHILS # BLD AUTO: 11.84 K/UL
NEUTROPHILS NFR BLD AUTO: 86.9 %
NITRITE URINE: NEGATIVE
PH URINE: 5.5
PLATELET # BLD AUTO: 162 K/UL
POTASSIUM SERPL-SCNC: 4.3 MMOL/L
PROT SERPL-MCNC: 7 G/DL
PROT UR-MCNC: 105 MG/DL
PROTEIN URINE: 100 MG/DL
PT BLD: 22.4 SEC
RBC # BLD: 4.69 M/UL
RBC # FLD: 19.1 %
RED BLOOD CELLS URINE: 2 /HPF
REVIEW: NORMAL
SODIUM SERPL-SCNC: 143 MMOL/L
SPECIFIC GRAVITY URINE: 1.02
URATE SERPL-MCNC: 6.3 MG/DL
UROBILINOGEN URINE: 1 MG/DL
WBC # FLD AUTO: 13.62 K/UL
WHITE BLOOD CELLS URINE: 13 /HPF

## 2023-12-27 NOTE — ASSESSMENT
[FreeTextEntry1] : 1.  SLE:  consisting of arthritis, nasal ulcers, Raynauds, alopecia, rash, and nephritis.  Rash was present during late 2014 and 2015, although during the late summer 2015, it wasn't particularly active. However, the rash was quite active in the spring, summer, and into the fall 2016 despite her medical regimen. It was  most prominent on the left arm and back.  Into 2017 the rash quieted down with the use of topical steroids. In 2018 it was intermittently active, but in late 2018/early 2019 the rash was quite active on the face, back, and hands.  She received an injection and topical therapy by her dermatologist.  The rash has remained active on her arms and thighs.  In addition, she has developed red spots (some tender) on the palmar aspects of her fingers. Her rash on the arms started to scale/improve. Arthralgias more prominent in summer, which is what is happening in summer 2019. Fingers, elbows and knees. Buttock rash present in 5/19 faded, but returned on arms, back. She saw VALARIE Flores who prescribed topicals, which have been helpful-as of Jan 2020 the active rash was confined to her buttocks. However, during the first part of 2020 the rash worsened despite the use of topicals.  The rash was quiet in early 2021.  She was not able to reduce prednisone to 7.5 mg/d as her hand arthritis worsened. The rash on her flanks became active, and she used topical steroids (triamcinolone 0.1%) with minimal success.  In Jul 2021 she reported  pain and swelling in her joints, mainly the hands. Rash was active on the RUE in Sep 2021. This improved, but she developed a rash on the right flank. In Feb 2020 there were few active manifestations of SLE. In 2023 she developed rash in the right flank, which partially responded to topical steroids. By May 2023 the rash was inactive.  2.  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  She has been treated with CellCept and remained on 2500 mg daily. She has had low-grade proteinuria. A 24 hour urine was to be collected in November 2016.  I was always concerned she had smoldering LN activity.  In the spring 2017 she stopped the CellCept on her own and then developed a major flare with an increase in creatinine to 2.5 with a urinary Pr/Cr of 5.  She was put back on CellCept and prednisone 40 mg/d.  The biopsy from late September 2017 demonstrated a class IV-G (A and C) lesion with 44% of the glomeruli sclerosed.  In addition, she had tubular atrophy and interstitial fibrosis estimated at about 50% (Activity: 10/24; Chronicity: 9/12).  It was decided despite the chronicity to try pulse steroids. Her creatinine went up but came back down to the high 2's. It dropped further to the low 2's. She was not seen from Dec 2017 until early Mar 2018.  She was doing relatively well clinically.  Lab tests were in order however. With a slight increase in furosemide, the edema improved. The creatinine has come down, albeit not normal. Given her chronic serologic activity and proteinuria, a repeat biopsy is in order.  She had a lot of chronicity on her last biopsy. A repeat biopsy was discussed to determine whether activity persists and whether a new therapeutic approach should be taken. Based on labs there seems to be a mixed picture, and without a kidney biopsy the activity of her kidney cannot be discerned, although she will no doubt have a lot of damage. The biopsy, which was performed in the spring 2019, failed to show activity; however, there was damage.  Therefore, I can't justify changing therapies.  3.  Lower extremity edema:  she had an ECHO and was told it was OK.  No back pain.  The edema came and went, but it has been diuretic responsive.  Her regimen of furosemide alternating with hydrochlorothiazide was switched to torsemide. She then went off all diuretics in early November 2017. The edema returned in the spring of 2019 perhaps because of an increase in prednisone. Pt could not tolerate furosemide 60 mg, so went down to 40 mg/d. However, on this dose she had edema, and it was suggested that she raise the dose. The edema disappeared when she took a higher dose of her furosemide. It got much better when she stopped the amlodipine.  4.  Chronic CellCept use:  tolerating without GI symptoms. 5.  Chronic Plaquenil use: no ocular symptoms 6.  Vitamin D deficiency 7.  Ocular migraine:  episode in late summer 2015 8.  URI 9.  Knee pain: intermittent right knee pain.  In Jul 2023 she had an effusion in the right knee. Aspiration was offered, but she decided to wait.  10.  Angioedema:  episodes in the fall 2015 involving the lips and eyes prompted an evaluation of allergy.  No allergen was identified.   12.  Dysesthesias in the legs: etiology unclear, but this symptom began in early 2016. 13.  Muscle cramps: she feels is related to diuretic use.  She cut down by using HCTZ, but this has not been an effective diuretic.  14.  Dry eyes: using topicals 15.  Hypertension:  remains elevated.  She was seen by GYN in the late spring 2017, her BP was 150/100 as it was on her June 28, 2017 visit. Additional therapy was warranted. Into 2019 her BP remained high. During her first visit of 2020 it was high again-though she had run out of Bystolic. She has noted feeling pre-syncopal at home when arising from a seated position.  With a low BP recorded on Jun 14, 2021, perhaps she is relatively hypotensive at home.   16.  Shingles:  outbreak on the right hemithorax end of October 2017 responsive to Valtrex.  17.  Hyperkalemia: she had high potassium's, but it was unclear if they were a result of difficulty drawing blood or from nephritis and/or valsartan.  The valsartan was stopped.  She started another what sounds like ARB (probably eprosartan). 18.  GERD: evaluated by CHEL Flores, including an endoscopy.  A variety of drugs failed.  She was referred for a motility evaluation.   19.  Gout:  episode of podagra left foot in August 2018.  She received prednisone with initial relief; however, a recurrence required another course.  No hx of kidney stones, tophi. She then experienced a similar episode in the right great toe necessitating more steroids.  Uloric was prescribed but not taken. In Sept 2019 she presented with 1 month of left 1st MTP pain and swelling. She decided to take Uloric, but in early 2020 stopped. It was restarted, but it may be that she needs a higher dose.  In late Apr 2021 she developed podagra (right great toe) that was unresponsive to increasing prednisone to 15 mg/d. This has lingered far longer than expected and requires 20-40 mg/d of prednisone.  20.  Rash: onset of rash left buttock in Oct 2018; etiology unclear but did not seem like shingles.  This rash returned in the spring 2019.  She used topical steroids.  21.  Headache: HA relieved with Tylenol during the early part of March 2019.  BPs have not been high enough to explain the HA.  22.  Anemia: the pattern appeared to be iron deficiency.  In 2023 she mentioned heavy periods were responsible. 23.  Jaw pain: onset of bilateral jaw pain in Jan 2020 for which she attained some relief by increasing the prednisone to 20 mg/d for two days.  Whether it was truly TMJ or perhaps parotid is unclear. It reproducibly improved on steroids.  24.  Low back pain: onset in the late spring 2020 of low back pain without trauma or radiation.  25.  Muscle cramps: episode in mid-Oct 2020 of diffuse muscle cramps, the cause of which was not known. Perhaps related to diuretics. The cramps were quite bothersome during 2022. 26. Paresthesias:  affecting fingertips and toes reported in early 2021. Noted at night. No color changes.   27. DVT: a below the knee DVT in the right leg.  With positive aPL Ab, I decided to treat with warfarin.  For how long is another question.  28.  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 Recurrences treated with Valtrex.  27.  Heather's pain: pain at insertion end of Oct 2021.  I suppose this could be gout. It prevents her from walking and generally subsides in one week.  28.  Rash: She reported in Aug 2022 solitary pruritic lesions on her lower back that dry up with steroid cream. She was seen by Derm in 2021, and a diagnosis of Herpes simplex was made.  The rash that was present in Dec 2023 was quite similar, and she was therefore urged to return to dermatology.  29.  LGSIL: scheduled for procedure May 26, 2023. Issue raised about anticoagulation.  I think it sufficient to stop warfarin three days before the procedure and resume the night of the procedure.  30.  Knee pain: posterior right knee pain in Sep 2023.  She underwent a Doppler, which was negative for a DVT.  More likely a Baker's cyst.   Plan Lab tests Reviewed internal and/or external records of other providers Contact me Return to Derm X-ray right knee Systemic Lupus Erythematosus, known as lupus, is a chronic autoimmune disease that can affect any organ in the body posing threats to proper organ function and even to life. Therefore, close surveillance of all bodily functions is required, including but not limited to central and peripheral nervous system, ocular and auditory systems, cardiopulmonary function, kidney function, mucocutaneous and musculoskeletal systems as well as constitutional manifestations. Surveillance consists of history, physical, and laboratory tests. Treatment varies, but most of the drugs used are high risk and therefore also require close monitoring in the form of blood and urine tests. High risk medications used in the treatment of rheumatic diseases include steroids, disease modifying agents, immunosuppressive therapies, antimalarials, biologics, and chemotherapy. Regardless of which drug or class of drug, the potential toxicities of these therapies mandate close monitoring in the form of a history, physical, and laboratory tests. Return 2-3 months

## 2023-12-27 NOTE — PHYSICAL EXAM
[General Appearance - Alert] : alert [General Appearance - In No Acute Distress] : in no acute distress [General Appearance - Well Nourished] : well nourished [General Appearance - Well Developed] : well developed [General Appearance - Well-Appearing] : healthy appearing [Sclera] : the sclera and conjunctiva were normal [Extraocular Movements] : extraocular movements were intact [Strabismus] : no strabismus was seen [Outer Ear] : the ears and nose were normal in appearance [Examination Of The Oral Cavity] : the lips and gums were normal [Nasal Cavity] : the nasal mucosa and septum were normal [Oropharynx] : the oropharynx was normal [Neck Appearance] : the appearance of the neck was normal [Neck Cervical Mass (___cm)] : no neck mass was observed [Jugular Venous Distention Increased] : there was no jugular-venous distention [Thyroid Diffuse Enlargement] : the thyroid was not enlarged [Respiration, Rhythm And Depth] : normal respiratory rhythm and effort [Exaggerated Use Of Accessory Muscles For Inspiration] : no accessory muscle use [Auscultation Breath Sounds / Voice Sounds] : lungs were clear to auscultation bilaterally [Heart Rate And Rhythm] : heart rate was normal and rhythm regular [Heart Sounds] : normal S1 and S2 [Heart Sounds Gallop] : no gallops [Murmurs] : no murmurs [Heart Sounds Pericardial Friction Rub] : no pericardial rub [Veins - Varicosity Changes] : there were no varicosital changes [Bowel Sounds] : normal bowel sounds [Abdomen Tenderness] : non-tender [Abdomen Soft] : soft [] : no hepato-splenomegaly [Abdomen Mass (___ Cm)] : no abdominal mass palpated [Cervical Lymph Nodes Enlarged Posterior Bilaterally] : posterior cervical [Cervical Lymph Nodes Enlarged Anterior Bilaterally] : anterior cervical [Supraclavicular Lymph Nodes Enlarged Bilaterally] : supraclavicular [No CVA Tenderness] : no ~M costovertebral angle tenderness [No Spinal Tenderness] : no spinal tenderness [Abnormal Walk] : normal gait [Nail Clubbing] : no clubbing  or cyanosis of the fingernails [Motor Tone] : muscle strength and tone were normal [Skin Turgor] : normal skin turgor [FreeTextEntry1] : scattered hyperpigmentation; flat rash lower back  [Sensation] : the sensory exam was normal to light touch and pinprick [Motor Exam] : the motor exam was normal [Oriented To Time, Place, And Person] : oriented to person, place, and time [Impaired Insight] : insight and judgment were intact [Affect] : the affect was normal [Mood] : the mood was normal

## 2023-12-27 NOTE — HISTORY OF PRESENT ILLNESS
[FreeTextEntry1] : 1.  SLE:  consisting of arthritis, nasal ulcers, Raynauds, alopecia, rash, and nephritis.  Rash was present during late 2014 and 2015, although during the late summer 2015, it wasn't particularly active. However, the rash was quite active in the spring, summer, and into the fall 2016 despite her medical regimen. It was  most prominent on the left arm and back.  Into 2017 the rash quieted down with the use of topical steroids. In 2018 it was intermittently active, but in late 2018/early 2019 the rash was quite active on the face, back, and hands.  She received an injection and topical therapy by her dermatologist.  The rash has remained active on her arms and thighs.  In addition, she has developed red spots (some tender) on the palmar aspects of her fingers. Her rash on the arms started to scale/improve. Arthralgias more prominent in summer, which is what is happening in summer 2019. Fingers, elbows and knees. Buttock rash present in 5/19 faded, but returned on arms, back. She saw VALARIE Flores who prescribed topicals, which have been helpful-as of Jan 2020 the active rash was confined to her buttocks. However, during the first part of 2020 the rash worsened despite the use of topicals.  The rash was quiet in early 2021.  She was not able to reduce prednisone to 7.5 mg/d as her hand arthritis worsened. The rash on her flanks became active, and she used topical steroids (triamcinolone 0.1%) with minimal success.  In Jul 2021 she reported  pain and swelling in her joints, mainly the hands. Rash was active on the RUE in Sep 2021. This improved, but she developed a rash on the right flank. In Feb 2020 there were few active manifestations of SLE. In 2023 she developed rash in the right flank, which partially responded to topical steroids. By May 2023 the rash was inactive.  2.  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  She has been treated with CellCept and remained on 2500 mg daily. She has had low-grade proteinuria. A 24 hour urine was to be collected in November 2016.  I was always concerned she had smoldering LN activity.  In the spring 2017 she stopped the CellCept on her own and then developed a major flare with an increase in creatinine to 2.5 with a urinary Pr/Cr of 5.  She was put back on CellCept and prednisone 40 mg/d.  The biopsy from late September 2017 demonstrated a class IV-G (A and C) lesion with 44% of the glomeruli sclerosed.  In addition, she had tubular atrophy and interstitial fibrosis estimated at about 50% (Activity: 10/24; Chronicity: 9/12).  It was decided despite the chronicity to try pulse steroids. Her creatinine went up but came back down to the high 2's. It dropped further to the low 2's. She was not seen from Dec 2017 until early Mar 2018.  She was doing relatively well clinically.  Lab tests were in order however. With a slight increase in furosemide, the edema improved. The creatinine has come down, albeit not normal. Given her chronic serologic activity and proteinuria, a repeat biopsy is in order.  She had a lot of chronicity on her last biopsy. A repeat biopsy was discussed to determine whether activity persists and whether a new therapeutic approach should be taken. Based on labs there seems to be a mixed picture, and without a kidney biopsy the activity of her kidney cannot be discerned, although she will no doubt have a lot of damage. The biopsy, which was performed in the spring 2019, failed to show activity; however, there was damage.  Therefore, I can't justify changing therapies.  3.  Lower extremity edema:  she had an ECHO and was told it was OK.  No back pain.  The edema came and went, but it has been diuretic responsive.  Her regimen of furosemide alternating with hydrochlorothiazide was switched to torsemide. She then went off all diuretics in early November 2017. The edema returned in the spring of 2019 perhaps because of an increase in prednisone. Pt could not tolerate furosemide 60 mg, so went down to 40 mg/d. However, on this dose she had edema, and it was suggested that she raise the dose. The edema disappeared when she took a higher dose of her furosemide. It got much better when she stopped the amlodipine.  4.  Chronic CellCept use:  tolerating without GI symptoms. 5.  Chronic Plaquenil use: no ocular symptoms 6.  Vitamin D deficiency 7.  Ocular migraine:  episode in late summer 2015 8.  URI 9.  Knee pain: intermittent right knee pain.  In Jul 2023 she had an effusion in the right knee. Aspiration was offered, but she decided to wait.  10.  Angioedema:  episodes in the fall 2015 involving the lips and eyes prompted an evaluation of allergy.  No allergen was identified.   12.  Dysesthesias in the legs: etiology unclear, but this symptom began in early 2016. 13.  Muscle cramps: she feels is related to diuretic use.  She cut down by using HCTZ, but this has not been an effective diuretic.  14.  Dry eyes: using topicals 15.  Hypertension:  remains elevated.  She was seen by GYN in the late spring 2017, her BP was 150/100 as it was on her June 28, 2017 visit. Additional therapy was warranted. Into 2019 her BP remained high. During her first visit of 2020 it was high again-though she had run out of Bystolic. She has noted feeling pre-syncopal at home when arising from a seated position.  With a low BP recorded on Jun 14, 2021, perhaps she is relatively hypotensive at home.   16.  Shingles:  outbreak on the right hemithorax end of October 2017 responsive to Valtrex.  17.  Hyperkalemia: she had high potassium's, but it was unclear if they were a result of difficulty drawing blood or from nephritis and/or valsartan.  The valsartan was stopped.  She started another what sounds like ARB (probably eprosartan). 18.  GERD: evaluated by CHEL Flores, including an endoscopy.  A variety of drugs failed.  She was referred for a motility evaluation.   19.  Gout:  episode of podagra left foot in August 2018.  She received prednisone with initial relief; however, a recurrence required another course.  No hx of kidney stones, tophi. She then experienced a similar episode in the right great toe necessitating more steroids.  Uloric was prescribed but not taken. In Sept 2019 she presented with 1 month of left 1st MTP pain and swelling. She decided to take Uloric, but in early 2020 stopped. It was restarted, but it may be that she needs a higher dose.  In late Apr 2021 she developed podagra (right great toe) that was unresponsive to increasing prednisone to 15 mg/d. This has lingered far longer than expected and requires 20-40 mg/d of prednisone.  20.  Rash: onset of rash left buttock in Oct 2018; etiology unclear but did not seem like shingles.  This rash returned in the spring 2019.  She used topical steroids.  21.  Headache: HA relieved with Tylenol during the early part of March 2019.  BPs have not been high enough to explain the HA.  22.  Anemia: the pattern appeared to be iron deficiency.  In 2023 she mentioned heavy periods were responsible. 23.  Jaw pain: onset of bilateral jaw pain in Jan 2020 for which she attained some relief by increasing the prednisone to 20 mg/d for two days.  Whether it was truly TMJ or perhaps parotid is unclear. It reproducibly improved on steroids.  24.  Low back pain: onset in the late spring 2020 of low back pain without trauma or radiation.  25.  Muscle cramps: episode in mid-Oct 2020 of diffuse muscle cramps, the cause of which was not known. Perhaps related to diuretics. The cramps were quite bothersome during 2022. 26. Paresthesias:  affecting fingertips and toes reported in early 2021. Noted at night. No color changes.   27. DVT: a below the knee DVT in the right leg.  With positive aPL Ab, I decided to treat with warfarin.  For how long is another question.  28.  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 Recurrences treated with Valtrex.  27.  Heather's pain: pain at insertion end of Oct 2021.  I suppose this could be gout. It prevents her from walking and generally subsides in one week.  28.  Rash: She reported in Aug 2022 solitary pruritic lesions on her lower back that dry up with steroid cream. She was seen by Derm in 2021, and a diagnosis of Herpes simplex was made.  The rash that was present in Dec 2023 was quite similar, and she was therefore urged to return to dermatology.  29.  LGSIL: scheduled for procedure May 26, 2023. Issue raised about anticoagulation.  I think it sufficient to stop warfarin three days before the procedure and resume the night of the procedure.  30.  Knee pain: posterior right knee pain in Sep 2023.  She underwent a Doppler, which was negative for a DVT.  More likely a Baker's cyst.   Meds warfarin  7.5/10 mg/d Uloric 40 mg/d   furosemide 40 mg/d prn  prednisone 5 mg/d   Wegovy CellCept 2000 mg/d in divided doses irbesartan 300 mg  Bystolic 10 mg/d Plaquenil 200 mg/d weekdays and 400 mg/d weekends Vit D 5000 IU/d Valtrex prn Ferrex 150 mg/d Feraheme spring 2023  Vaccines PNVX 3/24/2008 PNVX 1/7/2013 ([id number]; 30Jan2015) PNVX 23  2/14/19  ([id number]; 8/2/20) Prevnar 13 valent 2/3/2015 ([id number]; exp 3/16) Quadrivalent fluzone  9/29/2015 ( AA; exp 6/2016) Flu Quadrivalent 09/27/2021 ([place] 7317 MA; exp 6/30/2022) Shingrix 8/15/22  (GA5S2, exp 3/25/23; [id number]; exp 3/25/23)

## 2023-12-28 LAB — DSDNA AB SER-ACNC: 24 IU/ML

## 2024-01-22 NOTE — ASU PATIENT PROFILE, ADULT - PATIENT KNOW
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 Would like to refer
Referral to Dr. Byron Ordoñez for ventral hernia repair
yes

## 2024-02-06 ENCOUNTER — APPOINTMENT (OUTPATIENT)
Dept: RHEUMATOLOGY | Facility: CLINIC | Age: 54
End: 2024-02-06
Payer: COMMERCIAL

## 2024-02-06 ENCOUNTER — LABORATORY RESULT (OUTPATIENT)
Age: 54
End: 2024-02-06

## 2024-02-06 VITALS
TEMPERATURE: 98.1 F | OXYGEN SATURATION: 95 % | WEIGHT: 272 LBS | DIASTOLIC BLOOD PRESSURE: 94 MMHG | HEIGHT: 68 IN | RESPIRATION RATE: 16 BRPM | SYSTOLIC BLOOD PRESSURE: 131 MMHG | HEART RATE: 87 BPM | BODY MASS INDEX: 41.22 KG/M2

## 2024-02-06 DIAGNOSIS — Z79.60 LONG TERM (CURRENT) USE OF UNSPECIFIED IMMUNOMODULATORS AND IMMUNOSUPPRESSANTS: ICD-10-CM

## 2024-02-06 PROCEDURE — G2211 COMPLEX E/M VISIT ADD ON: CPT

## 2024-02-06 PROCEDURE — 99214 OFFICE O/P EST MOD 30 MIN: CPT

## 2024-02-06 RX ORDER — WARFARIN 10 MG/1
10 TABLET ORAL DAILY
Qty: 90 | Refills: 3 | Status: ACTIVE | COMMUNITY
Start: 2024-02-06 | End: 1900-01-01

## 2024-02-06 RX ORDER — TRIAMCINOLONE ACETONIDE 1 MG/G
0.1 OINTMENT TOPICAL TWICE DAILY
Qty: 1 | Refills: 3 | Status: ACTIVE | COMMUNITY
Start: 2021-07-14 | End: 1900-01-01

## 2024-02-06 RX ORDER — ACYCLOVIR 50 MG/G
5 OINTMENT TOPICAL 3 TIMES DAILY
Qty: 1 | Refills: 3 | Status: ACTIVE | COMMUNITY
Start: 2024-02-06 | End: 1900-01-01

## 2024-02-06 NOTE — PHYSICAL EXAM
[General Appearance - Alert] : alert [General Appearance - In No Acute Distress] : in no acute distress [General Appearance - Well Nourished] : well nourished [General Appearance - Well Developed] : well developed [General Appearance - Well-Appearing] : healthy appearing [Sclera] : the sclera and conjunctiva were normal [Extraocular Movements] : extraocular movements were intact [Strabismus] : no strabismus was seen [Outer Ear] : the ears and nose were normal in appearance [Examination Of The Oral Cavity] : the lips and gums were normal [Nasal Cavity] : the nasal mucosa and septum were normal [Oropharynx] : the oropharynx was normal [Neck Appearance] : the appearance of the neck was normal [Neck Cervical Mass (___cm)] : no neck mass was observed [Jugular Venous Distention Increased] : there was no jugular-venous distention [Thyroid Diffuse Enlargement] : the thyroid was not enlarged [Respiration, Rhythm And Depth] : normal respiratory rhythm and effort [Exaggerated Use Of Accessory Muscles For Inspiration] : no accessory muscle use [Auscultation Breath Sounds / Voice Sounds] : lungs were clear to auscultation bilaterally [Heart Rate And Rhythm] : heart rate was normal and rhythm regular [Heart Sounds] : normal S1 and S2 [Heart Sounds Gallop] : no gallops [Murmurs] : no murmurs [Heart Sounds Pericardial Friction Rub] : no pericardial rub [Veins - Varicosity Changes] : there were no varicosital changes [Bowel Sounds] : normal bowel sounds [Abdomen Soft] : soft [Abdomen Tenderness] : non-tender [] : no hepato-splenomegaly [Abdomen Mass (___ Cm)] : no abdominal mass palpated [Cervical Lymph Nodes Enlarged Posterior Bilaterally] : posterior cervical [Cervical Lymph Nodes Enlarged Anterior Bilaterally] : anterior cervical [Supraclavicular Lymph Nodes Enlarged Bilaterally] : supraclavicular [No CVA Tenderness] : no ~M costovertebral angle tenderness [No Spinal Tenderness] : no spinal tenderness [Abnormal Walk] : normal gait [Nail Clubbing] : no clubbing  or cyanosis of the fingernails [Motor Tone] : muscle strength and tone were normal [Skin Turgor] : normal skin turgor [FreeTextEntry1] : scattered hyperpigmentation; flat rash lower back  [Sensation] : the sensory exam was normal to light touch and pinprick [Motor Exam] : the motor exam was normal [Oriented To Time, Place, And Person] : oriented to person, place, and time [Impaired Insight] : insight and judgment were intact [Affect] : the affect was normal [Mood] : the mood was normal

## 2024-02-06 NOTE — HISTORY OF PRESENT ILLNESS
[FreeTextEntry1] : 1.  SLE:  consisting of arthritis, nasal ulcers, Raynauds, alopecia, rash, and nephritis.  Rash was present during late 2014 and 2015, although during the late summer 2015, it wasn't particularly active. However, the rash was quite active in the spring, summer, and into the fall 2016 despite her medical regimen. It was  most prominent on the left arm and back.  Into 2017 the rash quieted down with the use of topical steroids. In 2018 it was intermittently active, but in late 2018/early 2019 the rash was quite active on the face, back, and hands.  She received an injection and topical therapy by her dermatologist.  The rash has remained active on her arms and thighs.  In addition, she has developed red spots (some tender) on the palmar aspects of her fingers. Her rash on the arms started to scale/improve. Arthralgias more prominent in summer, which is what is happening in summer 2019. Fingers, elbows and knees. Buttock rash present in 5/19 faded, but returned on arms, back. She saw VALARIE Flores who prescribed topicals, which have been helpful-as of Jan 2020 the active rash was confined to her buttocks. However, during the first part of 2020 the rash worsened despite the use of topicals.  The rash was quiet in early 2021.  She was not able to reduce prednisone to 7.5 mg/d as her hand arthritis worsened. The rash on her flanks became active, and she used topical steroids (triamcinolone 0.1%) with minimal success.  In Jul 2021 she reported  pain and swelling in her joints, mainly the hands. Rash was active on the RUE in Sep 2021. This improved, but she developed a rash on the right flank. In Feb 2020 there were few active manifestations of SLE. In 2023 she developed rash in the right flank, which partially responded to topical steroids. By May 2023 the rash was inactive.  2.  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  She has been treated with CellCept and remained on 2500 mg daily. She has had low-grade proteinuria. A 24 hour urine was to be collected in November 2016.  I was always concerned she had smoldering LN activity.  In the spring 2017 she stopped the CellCept on her own and then developed a major flare with an increase in creatinine to 2.5 with a urinary Pr/Cr of 5.  She was put back on CellCept and prednisone 40 mg/d.  The biopsy from late September 2017 demonstrated a class IV-G (A and C) lesion with 44% of the glomeruli sclerosed.  In addition, she had tubular atrophy and interstitial fibrosis estimated at about 50% (Activity: 10/24; Chronicity: 9/12).  It was decided despite the chronicity to try pulse steroids. Her creatinine went up but came back down to the high 2's. It dropped further to the low 2's. She was not seen from Dec 2017 until early Mar 2018.  She was doing relatively well clinically.  Lab tests were in order however. With a slight increase in furosemide, the edema improved. The creatinine has come down, albeit not normal. Given her chronic serologic activity and proteinuria, a repeat biopsy is in order.  She had a lot of chronicity on her last biopsy. A repeat biopsy was discussed to determine whether activity persists and whether a new therapeutic approach should be taken. Based on labs there seems to be a mixed picture, and without a kidney biopsy the activity of her kidney cannot be discerned, although she will no doubt have a lot of damage. The biopsy, which was performed in the spring 2019, failed to show activity; however, there was damage.  Therefore, I can't justify changing therapies.  3.  Lower extremity edema:  she had an ECHO and was told it was OK.  No back pain.  The edema came and went, but it has been diuretic responsive.  Her regimen of furosemide alternating with hydrochlorothiazide was switched to torsemide. She then went off all diuretics in early November 2017. The edema returned in the spring of 2019 perhaps because of an increase in prednisone. Pt could not tolerate furosemide 60 mg, so went down to 40 mg/d. However, on this dose she had edema, and it was suggested that she raise the dose. The edema disappeared when she took a higher dose of her furosemide. It got much better when she stopped the amlodipine.  4.  Chronic CellCept use:  tolerating without GI symptoms. 5.  Chronic Plaquenil use: no ocular symptoms 6.  Vitamin D deficiency 7.  Ocular migraine:  episode in late summer 2015 8.  URI 9.  Knee pain: intermittent right knee pain.  In Jul 2023 she had an effusion in the right knee. Aspiration was offered, but she decided to wait.  10.  Angioedema:  episodes in the fall 2015 involving the lips and eyes prompted an evaluation of allergy.  No allergen was identified.   12.  Dysesthesias in the legs: etiology unclear, but this symptom began in early 2016. 13.  Muscle cramps: she feels is related to diuretic use.  She cut down by using HCTZ, but this has not been an effective diuretic.  14.  Dry eyes: using topicals 15.  Hypertension:  remains elevated.  She was seen by GYN in the late spring 2017, her BP was 150/100 as it was on her June 28, 2017 visit. Additional therapy was warranted. Into 2019 her BP remained high. During her first visit of 2020 it was high again-though she had run out of Bystolic. She has noted feeling pre-syncopal at home when arising from a seated position.  With a low BP recorded on Jun 14, 2021, perhaps she is relatively hypotensive at home.   16.  Shingles:  outbreak on the right hemithorax end of October 2017 responsive to Valtrex.  17.  Hyperkalemia: she had high potassium's, but it was unclear if they were a result of difficulty drawing blood or from nephritis and/or valsartan.  The valsartan was stopped.  She started another what sounds like ARB (probably eprosartan). 18.  GERD: evaluated by CHEL Flores, including an endoscopy.  A variety of drugs failed.  She was referred for a motility evaluation.   19.  Gout:  episode of podagra left foot in August 2018.  She received prednisone with initial relief; however, a recurrence required another course.  No hx of kidney stones, tophi. She then experienced a similar episode in the right great toe necessitating more steroids.  Uloric was prescribed but not taken. In Sept 2019 she presented with 1 month of left 1st MTP pain and swelling. She decided to take Uloric, but in early 2020 stopped. It was restarted, but it may be that she needs a higher dose.  In late Apr 2021 she developed podagra (right great toe) that was unresponsive to increasing prednisone to 15 mg/d. This has lingered far longer than expected and requires 20-40 mg/d of prednisone.  20.  Rash: onset of rash left buttock in Oct 2018; etiology unclear but did not seem like shingles.  This rash returned in the spring 2019.  She used topical steroids. Once again it returned in early 2024. It was partially responsive to triamcinolone, but I questioned whether it could be a return of HSV like in 2021.  21.  Headache: HA relieved with Tylenol during the early part of March 2019.  BPs have not been high enough to explain the HA.  22.  Anemia: the pattern appeared to be iron deficiency.  In 2023 she mentioned heavy periods were responsible. 23.  Jaw pain: onset of bilateral jaw pain in Jan 2020 for which she attained some relief by increasing the prednisone to 20 mg/d for two days.  Whether it was truly TMJ or perhaps parotid is unclear. It reproducibly improved on steroids.  24.  Low back pain: onset in the late spring 2020 of low back pain without trauma or radiation.  25.  Muscle cramps: episode in mid-Oct 2020 of diffuse muscle cramps, the cause of which was not known. Perhaps related to diuretics. The cramps were quite bothersome during 2022. 26. Paresthesias:  affecting fingertips and toes reported in early 2021. Noted at night. No color changes.   27. DVT: a below the knee DVT in the right leg.  With positive aPL Ab, I decided to treat with warfarin.  For how long is another question.  28.  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 Recurrences treated with Valtrex.  27.  Crum's pain: pain at insertion end of Oct 2021.  I suppose this could be gout. It prevents her from walking and generally subsides in one week.  28.  Rash: She reported in Aug 2022 solitary pruritic lesions on her lower back that dry up with steroid cream. She was seen by Derm in 2021, and a diagnosis of Herpes simplex was made.  The rash that was present in Dec 2023 was quite similar, and she was therefore urged to return to dermatology.  29.  LGSIL: scheduled for procedure May 26, 2023. Issue raised about anticoagulation.  I think it sufficient to stop warfarin three days before the procedure and resume the night of the procedure.  30.  Knee pain: posterior right knee pain in Sep 2023.  She underwent a Doppler, which was negative for a DVT.  More likely a Baker's cyst.   Meds warfarin  7.5/10 mg/d Uloric 40 mg/d   furosemide 20 mg/d prn  prednisone 5 mg/d   Wegovy CellCept 2000 mg/d in divided doses irbesartan 300 mg  Bystolic 10 mg/d Plaquenil 200 mg/d weekdays and 400 mg/d weekends Vit D 5000 IU/d Valtrex prn Ferrex 150 mg/d Feraheme spring 2023 triamcinolone ointment  Vaccines PNVX 3/24/2008 PNVX 1/7/2013 ([id number]; 30Jan2015) PNVX 23  2/14/19  ([id number]; 8/2/20) Prevnar 13 valent 2/3/2015 ([id number]; exp 3/16) Quadrivalent fluzone  9/29/2015 ( AA; exp 6/2016) Flu Quadrivalent 09/27/2021 ([place] 7317 MA; exp 6/30/2022) Shingrix 8/15/22  (GA5S2, exp 3/25/23; [id number]; exp 3/25/23)

## 2024-02-06 NOTE — ASSESSMENT
[FreeTextEntry1] : 1.  SLE:  consisting of arthritis, nasal ulcers, Raynauds, alopecia, rash, and nephritis.  Rash was present during late 2014 and 2015, although during the late summer 2015, it wasn't particularly active. However, the rash was quite active in the spring, summer, and into the fall 2016 despite her medical regimen. It was  most prominent on the left arm and back.  Into 2017 the rash quieted down with the use of topical steroids. In 2018 it was intermittently active, but in late 2018/early 2019 the rash was quite active on the face, back, and hands.  She received an injection and topical therapy by her dermatologist.  The rash has remained active on her arms and thighs.  In addition, she has developed red spots (some tender) on the palmar aspects of her fingers. Her rash on the arms started to scale/improve. Arthralgias more prominent in summer, which is what is happening in summer 2019. Fingers, elbows and knees. Buttock rash present in 5/19 faded, but returned on arms, back. She saw VALARIE Flores who prescribed topicals, which have been helpful-as of Jan 2020 the active rash was confined to her buttocks. However, during the first part of 2020 the rash worsened despite the use of topicals.  The rash was quiet in early 2021.  She was not able to reduce prednisone to 7.5 mg/d as her hand arthritis worsened. The rash on her flanks became active, and she used topical steroids (triamcinolone 0.1%) with minimal success.  In Jul 2021 she reported  pain and swelling in her joints, mainly the hands. Rash was active on the RUE in Sep 2021. This improved, but she developed a rash on the right flank. In Feb 2020 there were few active manifestations of SLE. In 2023 she developed rash in the right flank, which partially responded to topical steroids. By May 2023 the rash was inactive.  2.  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  She has been treated with CellCept and remained on 2500 mg daily. She has had low-grade proteinuria. A 24 hour urine was to be collected in November 2016.  I was always concerned she had smoldering LN activity.  In the spring 2017 she stopped the CellCept on her own and then developed a major flare with an increase in creatinine to 2.5 with a urinary Pr/Cr of 5.  She was put back on CellCept and prednisone 40 mg/d.  The biopsy from late September 2017 demonstrated a class IV-G (A and C) lesion with 44% of the glomeruli sclerosed.  In addition, she had tubular atrophy and interstitial fibrosis estimated at about 50% (Activity: 10/24; Chronicity: 9/12).  It was decided despite the chronicity to try pulse steroids. Her creatinine went up but came back down to the high 2's. It dropped further to the low 2's. She was not seen from Dec 2017 until early Mar 2018.  She was doing relatively well clinically.  Lab tests were in order however. With a slight increase in furosemide, the edema improved. The creatinine has come down, albeit not normal. Given her chronic serologic activity and proteinuria, a repeat biopsy is in order.  She had a lot of chronicity on her last biopsy. A repeat biopsy was discussed to determine whether activity persists and whether a new therapeutic approach should be taken. Based on labs there seems to be a mixed picture, and without a kidney biopsy the activity of her kidney cannot be discerned, although she will no doubt have a lot of damage. The biopsy, which was performed in the spring 2019, failed to show activity; however, there was damage.  Therefore, I can't justify changing therapies.  3.  Lower extremity edema:  she had an ECHO and was told it was OK.  No back pain.  The edema came and went, but it has been diuretic responsive.  Her regimen of furosemide alternating with hydrochlorothiazide was switched to torsemide. She then went off all diuretics in early November 2017. The edema returned in the spring of 2019 perhaps because of an increase in prednisone. Pt could not tolerate furosemide 60 mg, so went down to 40 mg/d. However, on this dose she had edema, and it was suggested that she raise the dose. The edema disappeared when she took a higher dose of her furosemide. It got much better when she stopped the amlodipine.  4.  Chronic CellCept use:  tolerating without GI symptoms. 5.  Chronic Plaquenil use: no ocular symptoms 6.  Vitamin D deficiency 7.  Ocular migraine:  episode in late summer 2015 8.  URI 9.  Knee pain: intermittent right knee pain.  In Jul 2023 she had an effusion in the right knee. Aspiration was offered, but she decided to wait.  10.  Angioedema:  episodes in the fall 2015 involving the lips and eyes prompted an evaluation of allergy.  No allergen was identified.   12.  Dysesthesias in the legs: etiology unclear, but this symptom began in early 2016. 13.  Muscle cramps: she feels is related to diuretic use.  She cut down by using HCTZ, but this has not been an effective diuretic.  14.  Dry eyes: using topicals 15.  Hypertension:  remains elevated.  She was seen by GYN in the late spring 2017, her BP was 150/100 as it was on her June 28, 2017 visit. Additional therapy was warranted. Into 2019 her BP remained high. During her first visit of 2020 it was high again-though she had run out of Bystolic. She has noted feeling pre-syncopal at home when arising from a seated position.  With a low BP recorded on Jun 14, 2021, perhaps she is relatively hypotensive at home.   16.  Shingles:  outbreak on the right hemithorax end of October 2017 responsive to Valtrex.  17.  Hyperkalemia: she had high potassium's, but it was unclear if they were a result of difficulty drawing blood or from nephritis and/or valsartan.  The valsartan was stopped.  She started another what sounds like ARB (probably eprosartan). 18.  GERD: evaluated by CHEL Flores, including an endoscopy.  A variety of drugs failed.  She was referred for a motility evaluation.   19.  Gout:  episode of podagra left foot in August 2018.  She received prednisone with initial relief; however, a recurrence required another course.  No hx of kidney stones, tophi. She then experienced a similar episode in the right great toe necessitating more steroids.  Uloric was prescribed but not taken. In Sept 2019 she presented with 1 month of left 1st MTP pain and swelling. She decided to take Uloric, but in early 2020 stopped. It was restarted, but it may be that she needs a higher dose.  In late Apr 2021 she developed podagra (right great toe) that was unresponsive to increasing prednisone to 15 mg/d. This has lingered far longer than expected and requires 20-40 mg/d of prednisone.  20.  Rash: onset of rash left buttock in Oct 2018; etiology unclear but did not seem like shingles.  This rash returned in the spring 2019.  She used topical steroids. Once again it returned in early 2024. It was partially responsive to triamcinolone, but I questioned whether it could be a return of HSV like in 2021.  21.  Headache: HA relieved with Tylenol during the early part of March 2019.  BPs have not been high enough to explain the HA.  22.  Anemia: the pattern appeared to be iron deficiency.  In 2023 she mentioned heavy periods were responsible. 23.  Jaw pain: onset of bilateral jaw pain in Jan 2020 for which she attained some relief by increasing the prednisone to 20 mg/d for two days.  Whether it was truly TMJ or perhaps parotid is unclear. It reproducibly improved on steroids.  24.  Low back pain: onset in the late spring 2020 of low back pain without trauma or radiation.  25.  Muscle cramps: episode in mid-Oct 2020 of diffuse muscle cramps, the cause of which was not known. Perhaps related to diuretics. The cramps were quite bothersome during 2022. 26. Paresthesias:  affecting fingertips and toes reported in early 2021. Noted at night. No color changes.   27. DVT: a below the knee DVT in the right leg.  With positive aPL Ab, I decided to treat with warfarin.  For how long is another question.  28.  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 Recurrences treated with Valtrex.  27.  Atco's pain: pain at insertion end of Oct 2021.  I suppose this could be gout. It prevents her from walking and generally subsides in one week.  28.  Rash: She reported in Aug 2022 solitary pruritic lesions on her lower back that dry up with steroid cream. She was seen by Derm in 2021, and a diagnosis of Herpes simplex was made.  The rash that was present in Dec 2023 was quite similar, and she was therefore urged to return to dermatology.  29.  LGSIL: scheduled for procedure May 26, 2023. Issue raised about anticoagulation.  I think it sufficient to stop warfarin three days before the procedure and resume the night of the procedure.  30.  Knee pain: posterior right knee pain in Sep 2023.  She underwent a Doppler, which was negative for a DVT.  More likely a Baker's cyst.   Plan Lab tests Reviewed internal and/or external records of other providers Contact me Return to Derm Trial of acyclovir ointment Systemic Lupus Erythematosus, known as lupus, is a chronic autoimmune disease that can affect any organ in the body posing threats to proper organ function and even to life. Therefore, close surveillance of all bodily functions is required, including but not limited to central and peripheral nervous system, ocular and auditory systems, cardiopulmonary function, kidney function, mucocutaneous and musculoskeletal systems as well as constitutional manifestations. Surveillance consists of history, physical, and laboratory tests. Treatment varies, but most of the drugs used are high risk and therefore also require close monitoring in the form of blood and urine tests. High risk medications used in the treatment of rheumatic diseases include steroids, disease modifying agents, immunosuppressive therapies, antimalarials, biologics, and chemotherapy. Regardless of which drug or class of drug, the potential toxicities of these therapies mandate close monitoring in the form of a history, physical, and laboratory tests. Return 2-3 months

## 2024-02-07 LAB
ALBUMIN SERPL ELPH-MCNC: 4.4 G/DL
ALP BLD-CCNC: 110 U/L
ALT SERPL-CCNC: 21 U/L
ANION GAP SERPL CALC-SCNC: 12 MMOL/L
APPEARANCE: CLEAR
AST SERPL-CCNC: 18 U/L
BACTERIA: NEGATIVE /HPF
BASOPHILS # BLD AUTO: 0.28 K/UL
BASOPHILS NFR BLD AUTO: 2.5 %
BILIRUB SERPL-MCNC: 0.4 MG/DL
BILIRUBIN URINE: NEGATIVE
BLOOD URINE: NEGATIVE
BUN SERPL-MCNC: 22 MG/DL
C3 SERPL-MCNC: 112 MG/DL
C4 SERPL-MCNC: 14 MG/DL
CALCIUM SERPL-MCNC: 9.6 MG/DL
CAST: 3 /LPF
CHLORIDE SERPL-SCNC: 110 MMOL/L
CK SERPL-CCNC: 69 U/L
CO2 SERPL-SCNC: 19 MMOL/L
COLOR: YELLOW
CREAT SERPL-MCNC: 1.85 MG/DL
CREAT SPEC-SCNC: 54 MG/DL
CREAT/PROT UR: 0.1 RATIO
EGFR: 33 ML/MIN/1.73M2
EOSINOPHIL # BLD AUTO: 0.09 K/UL
EOSINOPHIL NFR BLD AUTO: 0.8 %
EPITHELIAL CELLS: 3 /HPF
GLUCOSE QUALITATIVE U: NEGATIVE MG/DL
HCT VFR BLD CALC: 44.7 %
HGB BLD-MCNC: 14.5 G/DL
KETONES URINE: NEGATIVE MG/DL
LEUKOCYTE ESTERASE URINE: NEGATIVE
LYMPHOCYTES # BLD AUTO: 1.02 K/UL
LYMPHOCYTES NFR BLD AUTO: 9.1 %
MAN DIFF?: NORMAL
MCHC RBC-ENTMCNC: 25.6 PG
MCHC RBC-ENTMCNC: 32.4 GM/DL
MCV RBC AUTO: 78.8 FL
MICROSCOPIC-UA: NORMAL
MONOCYTES # BLD AUTO: 0.56 K/UL
MONOCYTES NFR BLD AUTO: 5 %
NEUTROPHILS # BLD AUTO: 9.01 K/UL
NEUTROPHILS NFR BLD AUTO: 80.2 %
NITRITE URINE: NEGATIVE
PH URINE: 5.5
PLATELET # BLD AUTO: 163 K/UL
POTASSIUM SERPL-SCNC: 4 MMOL/L
PROT SERPL-MCNC: 7.6 G/DL
PROT UR-MCNC: 8 MG/DL
PROTEIN URINE: NEGATIVE MG/DL
RBC # BLD: 5.67 M/UL
RBC # FLD: 20.2 %
RED BLOOD CELLS URINE: 1 /HPF
SODIUM SERPL-SCNC: 140 MMOL/L
SPECIFIC GRAVITY URINE: 1.01
URATE SERPL-MCNC: 4.4 MG/DL
UROBILINOGEN URINE: 0.2 MG/DL
WBC # FLD AUTO: 11.23 K/UL
WHITE BLOOD CELLS URINE: 0 /HPF

## 2024-02-08 LAB
ENA RNP AB SER IA-ACNC: 0.3 AL
ENA SM AB SER IA-ACNC: 1.1 AL
ENA SS-A AB SER IA-ACNC: 2.7 AL
ENA SS-B AB SER IA-ACNC: <0.2 AL

## 2024-02-09 ENCOUNTER — NON-APPOINTMENT (OUTPATIENT)
Age: 54
End: 2024-02-09

## 2024-02-09 DIAGNOSIS — B97.7 PAPILLOMAVIRUS AS THE CAUSE OF DISEASES CLASSIFIED ELSEWHERE: ICD-10-CM

## 2024-02-09 DIAGNOSIS — Z87.42 PERSONAL HISTORY OF OTHER DISEASES OF THE FEMALE GENITAL TRACT: ICD-10-CM

## 2024-02-09 DIAGNOSIS — N93.9 ABNORMAL UTERINE AND VAGINAL BLEEDING, UNSPECIFIED: ICD-10-CM

## 2024-02-09 DIAGNOSIS — R87.612 LOW GRADE SQUAMOUS INTRAEPITHELIAL LESION ON CYTOLOGIC SMEAR OF CERVIX (LGSIL): ICD-10-CM

## 2024-02-09 DIAGNOSIS — Z86.718 PERSONAL HISTORY OF OTHER VENOUS THROMBOSIS AND EMBOLISM: ICD-10-CM

## 2024-02-09 DIAGNOSIS — Z86.018 PERSONAL HISTORY OF OTHER BENIGN NEOPLASM: ICD-10-CM

## 2024-02-09 DIAGNOSIS — Z12.72 ENCOUNTER FOR SCREENING FOR MALIGNANT NEOPLASM OF VAGINA: ICD-10-CM

## 2024-02-09 LAB — DSDNA AB SER-ACNC: 29 IU/ML

## 2024-02-11 LAB — HYDROXYCHLOROQUINE CONCENTRATION: 1261 NG/ML

## 2024-02-16 ENCOUNTER — RX RENEWAL (OUTPATIENT)
Age: 54
End: 2024-02-16

## 2024-02-16 RX ORDER — NEBIVOLOL 10 MG/1
10 TABLET ORAL DAILY
Qty: 90 | Refills: 3 | Status: ACTIVE | COMMUNITY
Start: 1900-01-01 | End: 1900-01-01

## 2024-04-01 ENCOUNTER — RX RENEWAL (OUTPATIENT)
Age: 54
End: 2024-04-01

## 2024-04-01 RX ORDER — FEBUXOSTAT 40 MG/1
40 TABLET ORAL DAILY
Qty: 90 | Refills: 3 | Status: ACTIVE | COMMUNITY
Start: 2021-01-04 | End: 1900-01-01

## 2024-04-01 RX ORDER — IRBESARTAN 300 MG/1
300 TABLET ORAL
Qty: 90 | Refills: 3 | Status: ACTIVE | COMMUNITY
Start: 2019-11-20 | End: 1900-01-01

## 2024-04-02 ENCOUNTER — APPOINTMENT (OUTPATIENT)
Dept: RHEUMATOLOGY | Facility: CLINIC | Age: 54
End: 2024-04-02
Payer: COMMERCIAL

## 2024-04-02 DIAGNOSIS — M10.9 GOUT, UNSPECIFIED: ICD-10-CM

## 2024-04-02 PROCEDURE — 99215 OFFICE O/P EST HI 40 MIN: CPT

## 2024-04-03 PROBLEM — M10.9 GOUT ATTACK: Status: ACTIVE | Noted: 2018-09-19

## 2024-04-03 NOTE — PHYSICAL EXAM
[General Appearance - Alert] : alert [General Appearance - In No Acute Distress] : in no acute distress [General Appearance - Well Nourished] : well nourished [General Appearance - Well Developed] : well developed [General Appearance - Well-Appearing] : healthy appearing [Sclera] : the sclera and conjunctiva were normal [Extraocular Movements] : extraocular movements were intact [Strabismus] : no strabismus was seen [Outer Ear] : the ears and nose were normal in appearance [Examination Of The Oral Cavity] : the lips and gums were normal [Nasal Cavity] : the nasal mucosa and septum were normal [Oropharynx] : the oropharynx was normal [Neck Appearance] : the appearance of the neck was normal [Neck Cervical Mass (___cm)] : no neck mass was observed [Jugular Venous Distention Increased] : there was no jugular-venous distention [Thyroid Diffuse Enlargement] : the thyroid was not enlarged [Respiration, Rhythm And Depth] : normal respiratory rhythm and effort [Exaggerated Use Of Accessory Muscles For Inspiration] : no accessory muscle use [Auscultation Breath Sounds / Voice Sounds] : lungs were clear to auscultation bilaterally [Heart Rate And Rhythm] : heart rate was normal and rhythm regular [Heart Sounds] : normal S1 and S2 [Heart Sounds Gallop] : no gallops [Murmurs] : no murmurs [Heart Sounds Pericardial Friction Rub] : no pericardial rub [Veins - Varicosity Changes] : there were no varicosital changes [Bowel Sounds] : normal bowel sounds [Abdomen Tenderness] : non-tender [Abdomen Soft] : soft [] : no hepato-splenomegaly [Abdomen Mass (___ Cm)] : no abdominal mass palpated [Cervical Lymph Nodes Enlarged Posterior Bilaterally] : posterior cervical [Cervical Lymph Nodes Enlarged Anterior Bilaterally] : anterior cervical [No CVA Tenderness] : no ~M costovertebral angle tenderness [Supraclavicular Lymph Nodes Enlarged Bilaterally] : supraclavicular [No Spinal Tenderness] : no spinal tenderness [Nail Clubbing] : no clubbing  or cyanosis of the fingernails [Abnormal Walk] : normal gait [Motor Tone] : muscle strength and tone were normal [Skin Turgor] : normal skin turgor [FreeTextEntry1] : scattered hyperpigmentation; flat rash lower back  [Sensation] : the sensory exam was normal to light touch and pinprick [Motor Exam] : the motor exam was normal [Oriented To Time, Place, And Person] : oriented to person, place, and time [Impaired Insight] : insight and judgment were intact [Affect] : the affect was normal [Mood] : the mood was normal

## 2024-04-03 NOTE — ASSESSMENT
[FreeTextEntry1] : 1.  SLE:  consisting of arthritis, nasal ulcers, Raynauds, alopecia, rash, and nephritis.  Rash was present during late 2014 and 2015, although during the late summer 2015, it wasn't particularly active. However, the rash was quite active in the spring, summer, and into the fall 2016 despite her medical regimen. It was  most prominent on the left arm and back.  Into 2017 the rash quieted down with the use of topical steroids. In 2018 it was intermittently active, but in late 2018/early 2019 the rash was quite active on the face, back, and hands.  She received an injection and topical therapy by her dermatologist.  The rash has remained active on her arms and thighs.  In addition, she has developed red spots (some tender) on the palmar aspects of her fingers. Her rash on the arms started to scale/improve. Arthralgias more prominent in summer, which is what is happening in summer 2019. Fingers, elbows and knees. Buttock rash present in 5/19 faded, but returned on arms, back. She saw VALARIE Flores who prescribed topicals, which have been helpful-as of Jan 2020 the active rash was confined to her buttocks. However, during the first part of 2020 the rash worsened despite the use of topicals.  The rash was quiet in early 2021.  She was not able to reduce prednisone to 7.5 mg/d as her hand arthritis worsened. The rash on her flanks became active, and she used topical steroids (triamcinolone 0.1%) with minimal success.  In Jul 2021 she reported  pain and swelling in her joints, mainly the hands. Rash was active on the RUE in Sep 2021. This improved, but she developed a rash on the right flank. In Feb 2020 there were few active manifestations of SLE. In 2023 she developed rash in the right flank, which partially responded to topical steroids. By May 2023 the rash was inactive.  2.  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  She has been treated with CellCept and remained on 2500 mg daily. She has had low-grade proteinuria. A 24 hour urine was to be collected in November 2016.  I was always concerned she had smoldering LN activity.  In the spring 2017 she stopped the CellCept on her own and then developed a major flare with an increase in creatinine to 2.5 with a urinary Pr/Cr of 5.  She was put back on CellCept and prednisone 40 mg/d.  The biopsy from late September 2017 demonstrated a class IV-G (A and C) lesion with 44% of the glomeruli sclerosed.  In addition, she had tubular atrophy and interstitial fibrosis estimated at about 50% (Activity: 10/24; Chronicity: 9/12).  It was decided despite the chronicity to try pulse steroids. Her creatinine went up but came back down to the high 2's. It dropped further to the low 2's. She was not seen from Dec 2017 until early Mar 2018.  She was doing relatively well clinically.  Lab tests were in order however. With a slight increase in furosemide, the edema improved. The creatinine has come down, albeit not normal. Given her chronic serologic activity and proteinuria, a repeat biopsy is in order.  She had a lot of chronicity on her last biopsy. A repeat biopsy was discussed to determine whether activity persists and whether a new therapeutic approach should be taken. Based on labs there seems to be a mixed picture, and without a kidney biopsy the activity of her kidney cannot be discerned, although she will no doubt have a lot of damage. The biopsy, which was performed in the spring 2019, failed to show activity; however, there was damage.  Therefore, I can't justify changing therapies.  3.  Lower extremity edema:  she had an ECHO and was told it was OK.  No back pain.  The edema came and went, but it has been diuretic responsive.  Her regimen of furosemide alternating with hydrochlorothiazide was switched to torsemide. She then went off all diuretics in early November 2017. The edema returned in the spring of 2019 perhaps because of an increase in prednisone. Pt could not tolerate furosemide 60 mg, so went down to 40 mg/d. However, on this dose she had edema, and it was suggested that she raise the dose. The edema disappeared when she took a higher dose of her furosemide. It got much better when she stopped the amlodipine.  4.  Chronic CellCept use:  tolerating without GI symptoms. 5.  Chronic Plaquenil use: no ocular symptoms 6.  Vitamin D deficiency 7.  Ocular migraine:  episode in late summer 2015 8.  URI 9.  Knee pain: intermittent right knee pain.  In Jul 2023 she had an effusion in the right knee. Aspiration was offered, but she decided to wait.  10.  Angioedema:  episodes in the fall 2015 involving the lips and eyes prompted an evaluation of allergy.  No allergen was identified.   12.  Dysesthesias in the legs: etiology unclear, but this symptom began in early 2016. 13.  Muscle cramps: she feels is related to diuretic use.  She cut down by using HCTZ, but this has not been an effective diuretic.  14.  Dry eyes: using topicals 15.  Hypertension:  remains elevated.  She was seen by GYN in the late spring 2017, her BP was 150/100 as it was on her June 28, 2017 visit. Additional therapy was warranted. Into 2019 her BP remained high. During her first visit of 2020 it was high again-though she had run out of Bystolic. She has noted feeling pre-syncopal at home when arising from a seated position.  With a low BP recorded on Jun 14, 2021, perhaps she is relatively hypotensive at home.   16.  Shingles:  outbreak on the right hemithorax end of October 2017 responsive to Valtrex.  17.  Hyperkalemia: she had high potassium's, but it was unclear if they were a result of difficulty drawing blood or from nephritis and/or valsartan.  The valsartan was stopped.  She started another what sounds like ARB (probably eprosartan). 18.  GERD: evaluated by CHEL Flores, including an endoscopy.  A variety of drugs failed.  She was referred for a motility evaluation.   19.  Gout:  episode of podagra left foot in August 2018.  She received prednisone with initial relief; however, a recurrence required another course.  No hx of kidney stones, tophi. She then experienced a similar episode in the right great toe necessitating more steroids.  Uloric was prescribed but not taken. In Sept 2019 she presented with 1 month of left 1st MTP pain and swelling. She decided to take Uloric, but in early 2020 stopped. It was restarted, but it may be that she needs a higher dose.  In late Apr 2021 she developed podagra (right great toe) that was unresponsive to increasing prednisone to 15 mg/d. This has lingered far longer than expected and requires 20-40 mg/d of prednisone.  20.  Rash: onset of rash left buttock in Oct 2018; etiology unclear but did not seem like shingles.  This rash returned in the spring 2019.  She used topical steroids. Once again it returned in early 2024. It was partially responsive to triamcinolone, but I questioned whether it could be a return of HSV like in 2021.  21.  Headache: HA relieved with Tylenol during the early part of March 2019.  BPs have not been high enough to explain the HA.  22.  Anemia: the pattern appeared to be iron deficiency.  In 2023 she mentioned heavy periods were responsible. 23.  Jaw pain: onset of bilateral jaw pain in Jan 2020 for which she attained some relief by increasing the prednisone to 20 mg/d for two days.  Whether it was truly TMJ or perhaps parotid is unclear. It reproducibly improved on steroids.  24.  Low back pain: onset in the late spring 2020 of low back pain without trauma or radiation.  25.  Muscle cramps: episode in mid-Oct 2020 of diffuse muscle cramps, the cause of which was not known. Perhaps related to diuretics. The cramps were quite bothersome during 2022. 26. Paresthesias:  affecting fingertips and toes reported in early 2021. Noted at night. No color changes.   27. DVT: a below the knee DVT in the right leg.  With positive aPL Ab, I decided to treat with warfarin.  For how long is another question.  28.  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 Recurrences treated with Valtrex.  27.  Harrold's pain: pain at insertion end of Oct 2021.  I suppose this could be gout. It prevents her from walking and generally subsides in one week.  28.  Rash: She reported in Aug 2022 solitary pruritic lesions on her lower back that dry up with steroid cream. She was seen by Derm in 2021, and a diagnosis of Herpes simplex was made.  The rash that was present in Dec 2023 was quite similar, and she was therefore urged to return to dermatology.  29.  LGSIL: scheduled for procedure May 26, 2023. Issue raised about anticoagulation.  I think it sufficient to stop warfarin three days before the procedure and resume the night of the procedure.  30.  Knee pain: posterior right knee pain in Sep 2023.  She underwent a Doppler, which was negative for a DVT.  More likely a Baker's cyst.   Plan Lab tests Reviewed internal and/or external records of other providers Contact me Return to Derm Trial of acyclovir ointment Systemic Lupus Erythematosus, known as lupus, is a chronic autoimmune disease that can affect any organ in the body posing threats to proper organ function and even to life. Therefore, close surveillance of all bodily functions is required, including but not limited to central and peripheral nervous system, ocular and auditory systems, cardiopulmonary function, kidney function, mucocutaneous and musculoskeletal systems as well as constitutional manifestations. Surveillance consists of history, physical, and laboratory tests. Treatment varies, but most of the drugs used are high risk and therefore also require close monitoring in the form of blood and urine tests. High risk medications used in the treatment of rheumatic diseases include steroids, disease modifying agents, immunosuppressive therapies, antimalarials, biologics, and chemotherapy. Regardless of which drug or class of drug, the potential toxicities of these therapies mandate close monitoring in the form of a history, physical, and laboratory tests. Return 2-3 months

## 2024-04-03 NOTE — HISTORY OF PRESENT ILLNESS
[FreeTextEntry1] : 1.  SLE:  consisting of arthritis, nasal ulcers, Raynauds, alopecia, rash, and nephritis.  Rash was present during late 2014 and 2015, although during the late summer 2015, it wasn't particularly active. However, the rash was quite active in the spring, summer, and into the fall 2016 despite her medical regimen. It was  most prominent on the left arm and back.  Into 2017 the rash quieted down with the use of topical steroids. In 2018 it was intermittently active, but in late 2018/early 2019 the rash was quite active on the face, back, and hands.  She received an injection and topical therapy by her dermatologist.  The rash has remained active on her arms and thighs.  In addition, she has developed red spots (some tender) on the palmar aspects of her fingers. Her rash on the arms started to scale/improve. Arthralgias more prominent in summer, which is what is happening in summer 2019. Fingers, elbows and knees. Buttock rash present in 5/19 faded, but returned on arms, back. She saw VALARIE Flores who prescribed topicals, which have been helpful-as of Jan 2020 the active rash was confined to her buttocks. However, during the first part of 2020 the rash worsened despite the use of topicals.  The rash was quiet in early 2021.  She was not able to reduce prednisone to 7.5 mg/d as her hand arthritis worsened. The rash on her flanks became active, and she used topical steroids (triamcinolone 0.1%) with minimal success.  In Jul 2021 she reported  pain and swelling in her joints, mainly the hands. Rash was active on the RUE in Sep 2021. This improved, but she developed a rash on the right flank. In Feb 2020 there were few active manifestations of SLE. In 2023 she developed rash in the right flank, which partially responded to topical steroids. By May 2023 the rash was inactive.  2.  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  She has been treated with CellCept and remained on 2500 mg daily. She has had low-grade proteinuria. A 24 hour urine was to be collected in November 2016.  I was always concerned she had smoldering LN activity.  In the spring 2017 she stopped the CellCept on her own and then developed a major flare with an increase in creatinine to 2.5 with a urinary Pr/Cr of 5.  She was put back on CellCept and prednisone 40 mg/d.  The biopsy from late September 2017 demonstrated a class IV-G (A and C) lesion with 44% of the glomeruli sclerosed.  In addition, she had tubular atrophy and interstitial fibrosis estimated at about 50% (Activity: 10/24; Chronicity: 9/12).  It was decided despite the chronicity to try pulse steroids. Her creatinine went up but came back down to the high 2's. It dropped further to the low 2's. She was not seen from Dec 2017 until early Mar 2018.  She was doing relatively well clinically.  Lab tests were in order however. With a slight increase in furosemide, the edema improved. The creatinine has come down, albeit not normal. Given her chronic serologic activity and proteinuria, a repeat biopsy is in order.  She had a lot of chronicity on her last biopsy. A repeat biopsy was discussed to determine whether activity persists and whether a new therapeutic approach should be taken. Based on labs there seems to be a mixed picture, and without a kidney biopsy the activity of her kidney cannot be discerned, although she will no doubt have a lot of damage. The biopsy, which was performed in the spring 2019, failed to show activity; however, there was damage.  Therefore, I can't justify changing therapies.  3.  Lower extremity edema:  she had an ECHO and was told it was OK.  No back pain.  The edema came and went, but it has been diuretic responsive.  Her regimen of furosemide alternating with hydrochlorothiazide was switched to torsemide. She then went off all diuretics in early November 2017. The edema returned in the spring of 2019 perhaps because of an increase in prednisone. Pt could not tolerate furosemide 60 mg, so went down to 40 mg/d. However, on this dose she had edema, and it was suggested that she raise the dose. The edema disappeared when she took a higher dose of her furosemide. It got much better when she stopped the amlodipine.  4.  Chronic CellCept use:  tolerating without GI symptoms. 5.  Chronic Plaquenil use: no ocular symptoms 6.  Vitamin D deficiency 7.  Ocular migraine:  episode in late summer 2015 8.  URI 9.  Knee pain: intermittent right knee pain.  In Jul 2023 she had an effusion in the right knee. Aspiration was offered, but she decided to wait.  10.  Angioedema:  episodes in the fall 2015 involving the lips and eyes prompted an evaluation of allergy.  No allergen was identified.   12.  Dysesthesias in the legs: etiology unclear, but this symptom began in early 2016. 13.  Muscle cramps: she feels is related to diuretic use.  She cut down by using HCTZ, but this has not been an effective diuretic.  14.  Dry eyes: using topicals 15.  Hypertension:  remains elevated.  She was seen by GYN in the late spring 2017, her BP was 150/100 as it was on her June 28, 2017 visit. Additional therapy was warranted. Into 2019 her BP remained high. During her first visit of 2020 it was high again-though she had run out of Bystolic. She has noted feeling pre-syncopal at home when arising from a seated position.  With a low BP recorded on Jun 14, 2021, perhaps she is relatively hypotensive at home.   16.  Shingles:  outbreak on the right hemithorax end of October 2017 responsive to Valtrex.  17.  Hyperkalemia: she had high potassium's, but it was unclear if they were a result of difficulty drawing blood or from nephritis and/or valsartan.  The valsartan was stopped.  She started another what sounds like ARB (probably eprosartan). 18.  GERD: evaluated by CHEL Flores, including an endoscopy.  A variety of drugs failed.  She was referred for a motility evaluation.   19.  Gout:  episode of podagra left foot in August 2018.  She received prednisone with initial relief; however, a recurrence required another course.  No hx of kidney stones, tophi. She then experienced a similar episode in the right great toe necessitating more steroids.  Uloric was prescribed but not taken. In Sept 2019 she presented with 1 month of left 1st MTP pain and swelling. She decided to take Uloric, but in early 2020 stopped. It was restarted, but it may be that she needs a higher dose.  In late Apr 2021 she developed podagra (right great toe) that was unresponsive to increasing prednisone to 15 mg/d. This has lingered far longer than expected and requires 20-40 mg/d of prednisone.  20.  Rash: onset of rash left buttock in Oct 2018; etiology unclear but did not seem like shingles.  This rash returned in the spring 2019.  She used topical steroids. Once again it returned in early 2024. It was partially responsive to triamcinolone, but I questioned whether it could be a return of HSV like in 2021.  21.  Headache: HA relieved with Tylenol during the early part of March 2019.  BPs have not been high enough to explain the HA.  22.  Anemia: the pattern appeared to be iron deficiency.  In 2023 she mentioned heavy periods were responsible. 23.  Jaw pain: onset of bilateral jaw pain in Jan 2020 for which she attained some relief by increasing the prednisone to 20 mg/d for two days.  Whether it was truly TMJ or perhaps parotid is unclear. It reproducibly improved on steroids.  24.  Low back pain: onset in the late spring 2020 of low back pain without trauma or radiation.  25.  Muscle cramps: episode in mid-Oct 2020 of diffuse muscle cramps, the cause of which was not known. Perhaps related to diuretics. The cramps were quite bothersome during 2022. 26. Paresthesias:  affecting fingertips and toes reported in early 2021. Noted at night. No color changes.   27. DVT: a below the knee DVT in the right leg.  With positive aPL Ab, I decided to treat with warfarin.  For how long is another question.  28.  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 Recurrences treated with Valtrex.  27.  [place]'s pain: pain at insertion end of Oct 2021.  I suppose this could be gout. It prevents her from walking and generally subsides in one week.  28.  Rash: She reported in Aug 2022 solitary pruritic lesions on her lower back that dry up with steroid cream. She was seen by Derm in 2021, and a diagnosis of Herpes simplex was made.  The rash that was present in Dec 2023 was quite similar, and she was therefore urged to return to dermatology.  29.  LGSIL: scheduled for procedure May 26, 2023. Issue raised about anticoagulation.  I think it sufficient to stop warfarin three days before the procedure and resume the night of the procedure.  30.  Knee pain: posterior right knee pain in Sep 2023.  She underwent a Doppler, which was negative for a DVT.  More likely a Baker's cyst.   Meds warfarin  7.5/10 mg/d Uloric 40 mg/d   furosemide 20 mg/d prn  prednisone 5 mg/d   Wegovy CellCept 2000 mg/d in divided doses irbesartan 300 mg  Bystolic 10 mg/d Plaquenil 200 mg/d weekdays and 400 mg/d weekends Vit D 5000 IU/d Valtrex prn Ferrex 150 mg/d Feraheme spring 2023 triamcinolone ointment  Vaccines PNVX 3/24/2008 PNVX 1/7/2013 ([id number]; 30Jan2015) PNVX 23  2/14/19  ([id number]; 8/2/20) Prevnar 13 valent 2/3/2015 ([id number]; exp 3/16) Quadrivalent fluzone  9/29/2015 ( AA; exp 6/2016) Flu Quadrivalent 09/27/2021 ([place] [id number]; exp 6/30/2022) Shingrix 8/15/22  ([id number], exp 3/25/23; [id number]; exp 3/25/23)

## 2024-04-17 ENCOUNTER — APPOINTMENT (OUTPATIENT)
Dept: RHEUMATOLOGY | Facility: CLINIC | Age: 54
End: 2024-04-17

## 2024-04-17 VITALS
HEART RATE: 85 BPM | BODY MASS INDEX: 41.36 KG/M2 | TEMPERATURE: 97.8 F | OXYGEN SATURATION: 98 % | RESPIRATION RATE: 17 BRPM | WEIGHT: 272 LBS | SYSTOLIC BLOOD PRESSURE: 150 MMHG | DIASTOLIC BLOOD PRESSURE: 110 MMHG

## 2024-04-17 RX ORDER — IRBESARTAN 300 MG/1
300 TABLET ORAL DAILY
Qty: 2 | Refills: 0 | Status: ACTIVE | COMMUNITY
Start: 2024-04-17 | End: 1900-01-01

## 2024-04-25 ENCOUNTER — TRANSCRIPTION ENCOUNTER (OUTPATIENT)
Age: 54
End: 2024-04-25

## 2024-05-09 ENCOUNTER — APPOINTMENT (OUTPATIENT)
Dept: ULTRASOUND IMAGING | Facility: IMAGING CENTER | Age: 54
End: 2024-05-09
Payer: COMMERCIAL

## 2024-05-09 ENCOUNTER — APPOINTMENT (OUTPATIENT)
Dept: MAMMOGRAPHY | Facility: IMAGING CENTER | Age: 54
End: 2024-05-09
Payer: COMMERCIAL

## 2024-05-09 ENCOUNTER — OUTPATIENT (OUTPATIENT)
Dept: OUTPATIENT SERVICES | Facility: HOSPITAL | Age: 54
LOS: 1 days | End: 2024-05-09
Payer: COMMERCIAL

## 2024-05-09 DIAGNOSIS — D36.9 BENIGN NEOPLASM, UNSPECIFIED SITE: Chronic | ICD-10-CM

## 2024-05-09 DIAGNOSIS — Z98.890 OTHER SPECIFIED POSTPROCEDURAL STATES: Chronic | ICD-10-CM

## 2024-05-09 DIAGNOSIS — Z00.8 ENCOUNTER FOR OTHER GENERAL EXAMINATION: ICD-10-CM

## 2024-05-09 PROCEDURE — 77067 SCR MAMMO BI INCL CAD: CPT | Mod: 26

## 2024-05-09 PROCEDURE — 77067 SCR MAMMO BI INCL CAD: CPT

## 2024-05-09 PROCEDURE — 76641 ULTRASOUND BREAST COMPLETE: CPT | Mod: 26,50

## 2024-05-09 PROCEDURE — 77063 BREAST TOMOSYNTHESIS BI: CPT | Mod: 26

## 2024-05-09 PROCEDURE — 76641 ULTRASOUND BREAST COMPLETE: CPT

## 2024-05-09 PROCEDURE — 77063 BREAST TOMOSYNTHESIS BI: CPT

## 2024-05-22 ENCOUNTER — APPOINTMENT (OUTPATIENT)
Dept: OBGYN | Facility: CLINIC | Age: 54
End: 2024-05-22
Payer: COMMERCIAL

## 2024-05-22 VITALS
WEIGHT: 268 LBS | HEIGHT: 68 IN | DIASTOLIC BLOOD PRESSURE: 90 MMHG | HEART RATE: 89 BPM | BODY MASS INDEX: 40.62 KG/M2 | SYSTOLIC BLOOD PRESSURE: 128 MMHG

## 2024-05-22 PROCEDURE — 99396 PREV VISIT EST AGE 40-64: CPT

## 2024-05-22 NOTE — HISTORY OF PRESENT ILLNESS
[Patient reported mammogram was normal] : Patient reported mammogram was normal [Patient reported breast sonogram was normal] : Patient reported breast sonogram was normal [Patient reported PAP Smear was abnormal] : Patient reported PAP Smear was abnormal [N] : Patient does not use contraception [Y] : Positive pregnancy history [Currently In Menopause] : currently in menopause [No] : Patient does not have concerns regarding sex [Currently Active] : currently active [Men] : men [TextBox_4] : 52 yo presents for annual. pt feels well  [Mammogramdate] : 5/2024 [BreastSonogramDate] : 5/2024 [PapSmeardate] : 01/2023 [TextBox_31] : hpv+,  LSIL [LMPDate] : 2023 [PGHxTotal] : 4 [PGHxAbortions] : 2 [[facility] : 2 [TextBox_6] : 2023 [FreeTextEntry1] : 2023

## 2024-05-22 NOTE — PHYSICAL EXAM
[Chaperone Present] : A chaperone was present in the examining room during all aspects of the physical examination [[id number]] : A chaperone was present during the pelvic exam. [FreeTextEntry2] : MARTIN Terrazas [Appropriately responsive] : appropriately responsive [Alert] : alert [No Acute Distress] : no acute distress [No Lymphadenopathy] : no lymphadenopathy [Regular Rate Rhythm] : regular rate rhythm [No Murmurs] : no murmurs [Clear to Auscultation B/L] : clear to auscultation bilaterally [Soft] : soft [Non-tender] : non-tender [Non-distended] : non-distended [No HSM] : No HSM [No Lesions] : no lesions [No Mass] : no mass [Oriented x3] : oriented x3 [Examination Of The Breasts] : a normal appearance [No Masses] : no breast masses were palpable [Labia Majora] : normal [Labia Minora] : normal [Normal] : normal [No Bleeding] : There was no active vaginal bleeding [Absent] : absent [Uterine Adnexae] : non-palpable

## 2024-06-03 ENCOUNTER — APPOINTMENT (OUTPATIENT)
Dept: RHEUMATOLOGY | Facility: CLINIC | Age: 54
End: 2024-06-03
Payer: COMMERCIAL

## 2024-06-03 VITALS
DIASTOLIC BLOOD PRESSURE: 61 MMHG | HEART RATE: 88 BPM | TEMPERATURE: 98.1 F | BODY MASS INDEX: 38.8 KG/M2 | OXYGEN SATURATION: 96 % | SYSTOLIC BLOOD PRESSURE: 109 MMHG | WEIGHT: 256 LBS | RESPIRATION RATE: 16 BRPM | HEIGHT: 68 IN

## 2024-06-03 DIAGNOSIS — Z79.01 LONG TERM (CURRENT) USE OF ANTICOAGULANTS: ICD-10-CM

## 2024-06-03 DIAGNOSIS — E79.0 HYPERURICEMIA W/OUT SIGNS OF INFLAMMATORY ARTHRITIS AND TOPHACEOUS DISEASE: ICD-10-CM

## 2024-06-03 DIAGNOSIS — M32.14 GLOMERULAR DISEASE IN SYSTEMIC LUPUS ERYTHEMATOSUS: ICD-10-CM

## 2024-06-03 DIAGNOSIS — Z79.899 OTHER LONG TERM (CURRENT) DRUG THERAPY: ICD-10-CM

## 2024-06-03 DIAGNOSIS — I10 ESSENTIAL (PRIMARY) HYPERTENSION: ICD-10-CM

## 2024-06-03 DIAGNOSIS — B00.9 HERPESVIRAL INFECTION, UNSPECIFIED: ICD-10-CM

## 2024-06-03 DIAGNOSIS — M32.9 SYSTEMIC LUPUS ERYTHEMATOSUS, UNSPECIFIED: ICD-10-CM

## 2024-06-03 PROCEDURE — G0009: CPT

## 2024-06-03 PROCEDURE — 90677 PCV20 VACCINE IM: CPT

## 2024-06-03 PROCEDURE — 99214 OFFICE O/P EST MOD 30 MIN: CPT | Mod: 25

## 2024-06-03 RX ORDER — VALACYCLOVIR 500 MG/1
500 TABLET, FILM COATED ORAL DAILY
Qty: 30 | Refills: 3 | Status: ACTIVE | COMMUNITY
Start: 2024-06-03 | End: 1900-01-01

## 2024-06-03 NOTE — PHYSICAL EXAM
[General Appearance - Alert] : alert [General Appearance - In No Acute Distress] : in no acute distress [General Appearance - Well Nourished] : well nourished [General Appearance - Well Developed] : well developed [General Appearance - Well-Appearing] : healthy appearing [Sclera] : the sclera and conjunctiva were normal [Extraocular Movements] : extraocular movements were intact [Strabismus] : no strabismus was seen [Outer Ear] : the ears and nose were normal in appearance [Examination Of The Oral Cavity] : the lips and gums were normal [Nasal Cavity] : the nasal mucosa and septum were normal [Oropharynx] : the oropharynx was normal [Neck Appearance] : the appearance of the neck was normal [Neck Cervical Mass (___cm)] : no neck mass was observed [Jugular Venous Distention Increased] : there was no jugular-venous distention [Thyroid Diffuse Enlargement] : the thyroid was not enlarged [Respiration, Rhythm And Depth] : normal respiratory rhythm and effort [Exaggerated Use Of Accessory Muscles For Inspiration] : no accessory muscle use [Auscultation Breath Sounds / Voice Sounds] : lungs were clear to auscultation bilaterally [Heart Rate And Rhythm] : heart rate was normal and rhythm regular [Heart Sounds] : normal S1 and S2 [Heart Sounds Gallop] : no gallops [Murmurs] : no murmurs [Heart Sounds Pericardial Friction Rub] : no pericardial rub [Veins - Varicosity Changes] : there were no varicosital changes [Bowel Sounds] : normal bowel sounds [Abdomen Soft] : soft [Abdomen Tenderness] : non-tender [] : no hepato-splenomegaly [Abdomen Mass (___ Cm)] : no abdominal mass palpated [Cervical Lymph Nodes Enlarged Posterior Bilaterally] : posterior cervical [Cervical Lymph Nodes Enlarged Anterior Bilaterally] : anterior cervical [Supraclavicular Lymph Nodes Enlarged Bilaterally] : supraclavicular [No CVA Tenderness] : no ~M costovertebral angle tenderness [No Spinal Tenderness] : no spinal tenderness [Skin Turgor] : normal skin turgor [Sensation] : the sensory exam was normal to light touch and pinprick [Motor Exam] : the motor exam was normal [Oriented To Time, Place, And Person] : oriented to person, place, and time [Impaired Insight] : insight and judgment were intact [Affect] : the affect was normal [Mood] : the mood was normal [Abnormal Walk] : normal gait [Nail Clubbing] : no clubbing  or cyanosis of the fingernails [Motor Tone] : muscle strength and tone were normal [FreeTextEntry1] : small right knee effusion

## 2024-06-03 NOTE — ASSESSMENT
[FreeTextEntry1] : 1.  SLE:  consisting of arthritis, nasal ulcers, Raynauds, alopecia, rash, and nephritis.  Rash was present during late 2014 and 2015, although during the late summer 2015, it wasn't particularly active. However, the rash was quite active in the spring, summer, and into the fall 2016 despite her medical regimen. It was  most prominent on the left arm and back.  Into 2017 the rash quieted down with the use of topical steroids. In 2018 it was intermittently active, but in late 2018/early 2019 the rash was quite active on the face, back, and hands.  She received an injection and topical therapy by her dermatologist.  The rash has remained active on her arms and thighs.  In addition, she has developed red spots (some tender) on the palmar aspects of her fingers. Her rash on the arms started to scale/improve. Arthralgias more prominent in summer, which is what is happening in summer 2019. Fingers, elbows and knees. Buttock rash present in 5/19 faded, but returned on arms, back. She saw VALARIE Flores who prescribed topicals, which have been helpful-as of Jan 2020 the active rash was confined to her buttocks. However, during the first part of 2020 the rash worsened despite the use of topicals.  The rash was quiet in early 2021.  She was not able to reduce prednisone to 7.5 mg/d as her hand arthritis worsened. The rash on her flanks became active, and she used topical steroids (triamcinolone 0.1%) with minimal success.  In Jul 2021 she reported  pain and swelling in her joints, mainly the hands. Rash was active on the RUE in Sep 2021. This improved, but she developed a rash on the right flank. In Feb 2020 there were few active manifestations of SLE. In 2023 she developed rash in the right flank, which partially responded to topical steroids. By May 2023 the rash was inactive. Her rash continued to be inactive through 2024.  2.  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  She has been treated with CellCept and remained on 2500 mg daily. She has had low-grade proteinuria. A 24 hour urine was to be collected in November 2016.  I was always concerned she had smoldering LN activity.  In the spring 2017 she stopped the CellCept on her own and then developed a major flare with an increase in creatinine to 2.5 with a urinary Pr/Cr of 5.  She was put back on CellCept and prednisone 40 mg/d.  The biopsy from late September 2017 demonstrated a class IV-G (A and C) lesion with 44% of the glomeruli sclerosed.  In addition, she had tubular atrophy and interstitial fibrosis estimated at about 50% (Activity: 10/24; Chronicity: 9/12).  It was decided despite the chronicity to try pulse steroids. Her creatinine went up but came back down to the high 2's. It dropped further to the low 2's. She was not seen from Dec 2017 until early Mar 2018.  She was doing relatively well clinically.  Lab tests were in order however. With a slight increase in furosemide, the edema improved. The creatinine has come down, albeit not normal. Given her chronic serologic activity and proteinuria, a repeat biopsy is in order.  She had a lot of chronicity on her last biopsy. A repeat biopsy was discussed to determine whether activity persists and whether a new therapeutic approach should be taken. Based on labs there seems to be a mixed picture, and without a kidney biopsy the activity of her kidney cannot be discerned, although she will no doubt have a lot of damage. The biopsy, which was performed in the spring 2019, failed to show activity; however, there was damage.  Therefore, I can't justify changing therapies.  3.  Lower extremity edema:  she had an ECHO and was told it was OK.  No back pain.  The edema came and went, but it has been diuretic responsive.  Her regimen of furosemide alternating with hydrochlorothiazide was switched to torsemide. She then went off all diuretics in early November 2017. The edema returned in the spring of 2019 perhaps because of an increase in prednisone. Pt could not tolerate furosemide 60 mg, so went down to 40 mg/d. However, on this dose she had edema, and it was suggested that she raise the dose. The edema disappeared when she took a higher dose of her furosemide. It got much better when she stopped the amlodipine.  4.  Chronic CellCept use:  tolerating without GI symptoms. 5.  Chronic Plaquenil use: no ocular symptoms 6.  Vitamin D deficiency 7.  Ocular migraine:  episode in late summer 2015 8.  URI 9.  Knee pain: intermittent right knee pain.  In Jul 2023 she had an effusion in the right knee. Aspiration was offered, but she decided to wait.  10.  Angioedema:  episodes in the fall 2015 involving the lips and eyes prompted an evaluation of allergy.  No allergen was identified.   12.  Dysesthesias in the legs: etiology unclear, but this symptom began in early 2016. 13.  Muscle cramps: she feels is related to diuretic use.  She cut down by using HCTZ, but this has not been an effective diuretic.  14.  Dry eyes: using topicals 15.  Hypertension:  remains elevated.  She was seen by GYN in the late spring 2017, her BP was 150/100 as it was on her June 28, 2017 visit. Additional therapy was warranted. Into 2019 her BP remained high. During her first visit of 2020 it was high again-though she had run out of Bystolic. She has noted feeling pre-syncopal at home when arising from a seated position.  With a low BP recorded on Jun 14, 2021, perhaps she is relatively hypotensive at home.   16.  Shingles:  outbreak on the right hemithorax end of October 2017 responsive to Valtrex.  17.  Hyperkalemia: she had high potassium's, but it was unclear if they were a result of difficulty drawing blood or from nephritis and/or valsartan.  The valsartan was stopped.  She started another what sounds like ARB (probably eprosartan). 18.  GERD: evaluated by CHEL Flores, including an endoscopy.  A variety of drugs failed.  She was referred for a motility evaluation.   19.  Gout:  episode of podagra left foot in August 2018.  She received prednisone with initial relief; however, a recurrence required another course.  No hx of kidney stones, tophi. She then experienced a similar episode in the right great toe necessitating more steroids.  Uloric was prescribed but not taken. In Sept 2019 she presented with 1 month of left 1st MTP pain and swelling. She decided to take Uloric, but in early 2020 stopped. It was restarted, but it may be that she needs a higher dose.  In late Apr 2021 she developed podagra (right great toe) that was unresponsive to increasing prednisone to 15 mg/d. This has lingered far longer than expected and requires 20-40 mg/d of prednisone.  20.  Rash: onset of rash left buttock in Oct 2018; etiology unclear but did not seem like shingles.  This rash returned in the spring 2019.  She used topical steroids. Once again it returned in early 2024. It was partially responsive to triamcinolone, but I questioned whether it could be a return of HSV like in 2021.  21.  Headache: HA relieved with Tylenol during the early part of March 2019.  BPs have not been high enough to explain the HA.  22.  Anemia: the pattern appeared to be iron deficiency.  In 2023 she mentioned heavy periods were responsible. 23.  Jaw pain: onset of bilateral jaw pain in Jan 2020 for which she attained some relief by increasing the prednisone to 20 mg/d for two days.  Whether it was truly TMJ or perhaps parotid is unclear. It reproducibly improved on steroids.  24.  Low back pain: onset in the late spring 2020 of low back pain without trauma or radiation.  25.  Muscle cramps: episode in mid-Oct 2020 of diffuse muscle cramps, the cause of which was not known. Perhaps related to diuretics. The cramps were quite bothersome during 2022. 26. Paresthesias:  affecting fingertips and toes reported in early 2021. Noted at night. No color changes.   27. DVT: a below the knee DVT in the right leg.  With positive aPL Ab, I decided to treat with warfarin.  For how long is another question.  28.  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 Recurrences treated with Valtrex. She experienced several outbreaks per month.  Therefore, a trial of low dose Valtrex prophylaxis was warranted.  27.  [place]'s pain: pain at insertion end of Oct 2021.  I suppose this could be gout. It prevents her from walking and generally subsides in one week.  28.  Rash: She reported in Aug 2022 solitary pruritic lesions on her lower back that dry up with steroid cream. She was seen by Derm in 2021, and a diagnosis of Herpes simplex was made.  The rash that was present in Dec 2023 was quite similar, and she was therefore urged to return to dermatology.  29.  LGSIL: scheduled for procedure May 26, 2023. Issue raised about anticoagulation.  I think it sufficient to stop warfarin three days before the procedure and resume the night of the procedure.  30.  Knee pain: posterior right knee pain in Sep 2023.  She underwent a Doppler, which was negative for a DVT.  More likely a Baker's cyst.   Plan Lab tests Reviewed internal and/or external records of other providers Contact me Trial of Valtrex 500 mg/d prophylaxis-in ineffective, increase the dose to 1000 mg/d Prevnar 20 IM left deltoid Systemic Lupus Erythematosus, known as lupus, is a chronic autoimmune disease that can affect any organ in the body posing threats to proper organ function and even to life. Therefore, close surveillance of all bodily functions is required, including but not limited to central and peripheral nervous system, ocular and auditory systems, cardiopulmonary function, kidney function, mucocutaneous and musculoskeletal systems as well as constitutional manifestations. Surveillance consists of history, physical, and laboratory tests. Treatment varies, but most of the drugs used are high risk and therefore also require close monitoring in the form of blood and urine tests. High risk medications used in the treatment of rheumatic diseases include steroids, disease modifying agents, immunosuppressive therapies, antimalarials, biologics, and chemotherapy. Regardless of which drug or class of drug, the potential toxicities of these therapies mandate close monitoring in the form of a history, physical, and laboratory tests. Return 2-3 months

## 2024-06-03 NOTE — HISTORY OF PRESENT ILLNESS
[FreeTextEntry1] : 1.  SLE:  consisting of arthritis, nasal ulcers, Raynauds, alopecia, rash, and nephritis.  Rash was present during late 2014 and 2015, although during the late summer 2015, it wasn't particularly active. However, the rash was quite active in the spring, summer, and into the fall 2016 despite her medical regimen. It was  most prominent on the left arm and back.  Into 2017 the rash quieted down with the use of topical steroids. In 2018 it was intermittently active, but in late 2018/early 2019 the rash was quite active on the face, back, and hands.  She received an injection and topical therapy by her dermatologist.  The rash has remained active on her arms and thighs.  In addition, she has developed red spots (some tender) on the palmar aspects of her fingers. Her rash on the arms started to scale/improve. Arthralgias more prominent in summer, which is what is happening in summer 2019. Fingers, elbows and knees. Buttock rash present in 5/19 faded, but returned on arms, back. She saw VALARIE Flores who prescribed topicals, which have been helpful-as of Jan 2020 the active rash was confined to her buttocks. However, during the first part of 2020 the rash worsened despite the use of topicals.  The rash was quiet in early 2021.  She was not able to reduce prednisone to 7.5 mg/d as her hand arthritis worsened. The rash on her flanks became active, and she used topical steroids (triamcinolone 0.1%) with minimal success.  In Jul 2021 she reported  pain and swelling in her joints, mainly the hands. Rash was active on the RUE in Sep 2021. This improved, but she developed a rash on the right flank. In Feb 2020 there were few active manifestations of SLE. In 2023 she developed rash in the right flank, which partially responded to topical steroids. By May 2023 the rash was inactive. Her rash continued to be inactive through 2024.  2.  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  She has been treated with CellCept and remained on 2500 mg daily. She has had low-grade proteinuria. A 24 hour urine was to be collected in November 2016.  I was always concerned she had smoldering LN activity.  In the spring 2017 she stopped the CellCept on her own and then developed a major flare with an increase in creatinine to 2.5 with a urinary Pr/Cr of 5.  She was put back on CellCept and prednisone 40 mg/d.  The biopsy from late September 2017 demonstrated a class IV-G (A and C) lesion with 44% of the glomeruli sclerosed.  In addition, she had tubular atrophy and interstitial fibrosis estimated at about 50% (Activity: 10/24; Chronicity: 9/12).  It was decided despite the chronicity to try pulse steroids. Her creatinine went up but came back down to the high 2's. It dropped further to the low 2's. She was not seen from Dec 2017 until early Mar 2018.  She was doing relatively well clinically.  Lab tests were in order however. With a slight increase in furosemide, the edema improved. The creatinine has come down, albeit not normal. Given her chronic serologic activity and proteinuria, a repeat biopsy is in order.  She had a lot of chronicity on her last biopsy. A repeat biopsy was discussed to determine whether activity persists and whether a new therapeutic approach should be taken. Based on labs there seems to be a mixed picture, and without a kidney biopsy the activity of her kidney cannot be discerned, although she will no doubt have a lot of damage. The biopsy, which was performed in the spring 2019, failed to show activity; however, there was damage.  Therefore, I can't justify changing therapies.  3.  Lower extremity edema:  she had an ECHO and was told it was OK.  No back pain.  The edema came and went, but it has been diuretic responsive.  Her regimen of furosemide alternating with hydrochlorothiazide was switched to torsemide. She then went off all diuretics in early November 2017. The edema returned in the spring of 2019 perhaps because of an increase in prednisone. Pt could not tolerate furosemide 60 mg, so went down to 40 mg/d. However, on this dose she had edema, and it was suggested that she raise the dose. The edema disappeared when she took a higher dose of her furosemide. It got much better when she stopped the amlodipine.  4.  Chronic CellCept use:  tolerating without GI symptoms. 5.  Chronic Plaquenil use: no ocular symptoms 6.  Vitamin D deficiency 7.  Ocular migraine:  episode in late summer 2015 8.  URI 9.  Knee pain: intermittent right knee pain.  In Jul 2023 she had an effusion in the right knee. Aspiration was offered, but she decided to wait.  10.  Angioedema:  episodes in the fall 2015 involving the lips and eyes prompted an evaluation of allergy.  No allergen was identified.   12.  Dysesthesias in the legs: etiology unclear, but this symptom began in early 2016. 13.  Muscle cramps: she feels is related to diuretic use.  She cut down by using HCTZ, but this has not been an effective diuretic.  14.  Dry eyes: using topicals 15.  Hypertension:  remains elevated.  She was seen by GYN in the late spring 2017, her BP was 150/100 as it was on her June 28, 2017 visit. Additional therapy was warranted. Into 2019 her BP remained high. During her first visit of 2020 it was high again-though she had run out of Bystolic. She has noted feeling pre-syncopal at home when arising from a seated position.  With a low BP recorded on Jun 14, 2021, perhaps she is relatively hypotensive at home.   16.  Shingles:  outbreak on the right hemithorax end of October 2017 responsive to Valtrex.  17.  Hyperkalemia: she had high potassium's, but it was unclear if they were a result of difficulty drawing blood or from nephritis and/or valsartan.  The valsartan was stopped.  She started another what sounds like ARB (probably eprosartan). 18.  GERD: evaluated by CHEL Flores, including an endoscopy.  A variety of drugs failed.  She was referred for a motility evaluation.   19.  Gout:  episode of podagra left foot in August 2018.  She received prednisone with initial relief; however, a recurrence required another course.  No hx of kidney stones, tophi. She then experienced a similar episode in the right great toe necessitating more steroids.  Uloric was prescribed but not taken. In Sept 2019 she presented with 1 month of left 1st MTP pain and swelling. She decided to take Uloric, but in early 2020 stopped. It was restarted, but it may be that she needs a higher dose.  In late Apr 2021 she developed podagra (right great toe) that was unresponsive to increasing prednisone to 15 mg/d. This has lingered far longer than expected and requires 20-40 mg/d of prednisone.  20.  Rash: onset of rash left buttock in Oct 2018; etiology unclear but did not seem like shingles.  This rash returned in the spring 2019.  She used topical steroids. Once again it returned in early 2024. It was partially responsive to triamcinolone, but I questioned whether it could be a return of HSV like in 2021.  21.  Headache: HA relieved with Tylenol during the early part of March 2019.  BPs have not been high enough to explain the HA.  22.  Anemia: the pattern appeared to be iron deficiency.  In 2023 she mentioned heavy periods were responsible. 23.  Jaw pain: onset of bilateral jaw pain in Jan 2020 for which she attained some relief by increasing the prednisone to 20 mg/d for two days.  Whether it was truly TMJ or perhaps parotid is unclear. It reproducibly improved on steroids.  24.  Low back pain: onset in the late spring 2020 of low back pain without trauma or radiation.  25.  Muscle cramps: episode in mid-Oct 2020 of diffuse muscle cramps, the cause of which was not known. Perhaps related to diuretics. The cramps were quite bothersome during 2022. 26. Paresthesias:  affecting fingertips and toes reported in early 2021. Noted at night. No color changes.   27. DVT: a below the knee DVT in the right leg.  With positive aPL Ab, I decided to treat with warfarin.  For how long is another question.  28.  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 Recurrences treated with Valtrex. She experienced several outbreaks per month.  Therefore, a trial of low dose Valtrex prophylaxis was warranted.  27.  [place]'s pain: pain at insertion end of Oct 2021.  I suppose this could be gout. It prevents her from walking and generally subsides in one week.  28.  Rash: She reported in Aug 2022 solitary pruritic lesions on her lower back that dry up with steroid cream. She was seen by Derm in 2021, and a diagnosis of Herpes simplex was made.  The rash that was present in Dec 2023 was quite similar, and she was therefore urged to return to dermatology.  29.  LGSIL: scheduled for procedure May 26, 2023. Issue raised about anticoagulation.  I think it sufficient to stop warfarin three days before the procedure and resume the night of the procedure.  30.  Knee pain: posterior right knee pain in Sep 2023.  She underwent a Doppler, which was negative for a DVT.  More likely a Baker's cyst.   Meds warfarin 10 mg/d Uloric 40 mg/d   furosemide 20 mg/d prn  prednisone 5 mg/d   Wegovy CellCept 2000 mg/d in divided doses irbesartan 300 mg  Bystolic 10 mg/d Plaquenil 200 mg/d weekdays and 400 mg/d weekends Vit D 5000 IU/d Valtrex prn Ferrex 150 mg/d Feraheme spring 2023 triamcinolone ointment  Vaccines PNVX 3/24/2008 PNVX 1/7/2013 ([id number]; 30Jan2015) PNVX 23  2/14/19  ([id number]; 8/2/20) Prevnar 13 valent 2/3/2015 ([id number]; exp 3/16) Prevnar 20 6/3/24 ([id number]; exp 3/25) Quadrivalent fluzone  9/29/2015 ( AA; exp 6/2016) Flu Quadrivalent 09/27/2021 ([id number]; exp 6/30/2022) Shingrix 8/15/22  ([id number], exp 3/25/23; [id number]; exp 3/25/23)

## 2024-06-04 LAB
ALBUMIN SERPL ELPH-MCNC: 4.3 G/DL
ALP BLD-CCNC: 106 U/L
ALT SERPL-CCNC: 20 U/L
ANION GAP SERPL CALC-SCNC: 14 MMOL/L
AST SERPL-CCNC: 21 U/L
BILIRUB SERPL-MCNC: 0.5 MG/DL
BUN SERPL-MCNC: 32 MG/DL
C3 SERPL-MCNC: 112 MG/DL
C4 SERPL-MCNC: 16 MG/DL
CALCIUM SERPL-MCNC: 9.6 MG/DL
CHLORIDE SERPL-SCNC: 109 MMOL/L
CK SERPL-CCNC: 117 U/L
CO2 SERPL-SCNC: 15 MMOL/L
CREAT SERPL-MCNC: 2.03 MG/DL
DSDNA AB SER-ACNC: 5 IU/ML
EGFR: 29 ML/MIN/1.73M2
INR PPP: 2.27 RATIO
MEV IGG FLD QL IA: 13.4 AU/ML
MEV IGG+IGM SER-IMP: NEGATIVE
POTASSIUM SERPL-SCNC: 4.3 MMOL/L
PROT SERPL-MCNC: 7.8 G/DL
PT BLD: 25 SEC
RUBV IGG FLD-ACNC: 11.3 INDEX
RUBV IGG SER-IMP: POSITIVE
SODIUM SERPL-SCNC: 138 MMOL/L
URATE SERPL-MCNC: 5.4 MG/DL

## 2024-06-06 LAB
APPEARANCE: ABNORMAL
BACTERIA: ABNORMAL /HPF
BILIRUBIN URINE: NEGATIVE
BLOOD URINE: NEGATIVE
CAST: 4 /LPF
COLOR: YELLOW
CREAT SPEC-SCNC: 175 MG/DL
CREAT/PROT UR: 0.1 RATIO
EPITHELIAL CELLS: >36 /HPF
GLUCOSE QUALITATIVE U: NEGATIVE MG/DL
KETONES URINE: NEGATIVE MG/DL
LEUKOCYTE ESTERASE URINE: NEGATIVE
MICROSCOPIC-UA: NORMAL
NITRITE URINE: NEGATIVE
PH URINE: 5.5
PROT UR-MCNC: 13 MG/DL
PROTEIN URINE: NORMAL MG/DL
RED BLOOD CELLS URINE: 0 /HPF
SPECIFIC GRAVITY URINE: 1.02
UROBILINOGEN URINE: 0.2 MG/DL
WHITE BLOOD CELLS URINE: 2 /HPF

## 2024-06-08 ENCOUNTER — RX RENEWAL (OUTPATIENT)
Age: 54
End: 2024-06-08

## 2024-06-08 LAB — HYDROXYCHLOROQUINE CONCENTRATION: 1357 NG/ML

## 2024-06-08 RX ORDER — PREDNISONE 2.5 MG/1
2.5 TABLET ORAL DAILY
Qty: 180 | Refills: 3 | Status: ACTIVE | COMMUNITY
Start: 2019-04-15 | End: 1900-01-01

## 2024-06-14 ENCOUNTER — NON-APPOINTMENT (OUTPATIENT)
Age: 54
End: 2024-06-14

## 2024-06-24 ENCOUNTER — TRANSCRIPTION ENCOUNTER (OUTPATIENT)
Age: 54
End: 2024-06-24

## 2024-07-18 NOTE — OB HISTORY
Caller: JULIO MCKEON    Relationship: Mother    Best call back number: [phone]    Requested Prescriptions:   Requested Prescriptions     Pending Prescriptions Disp Refills    methylphenidate (Ritalin) 20 MG tablet 60 tablet 0     Sig: Take 1 tablet by mouth 2 (Two) Times a Day.        Pharmacy where request should be sent: [facility]     Last office visit with prescribing clinician: 3/22/2024   Last telemedicine visit with prescribing clinician: Visit date not found   Next office visit with prescribing clinician: 7/22/2024     Additional details provided by patient: HAS 3 DAYS LEFT     Does the patient have less than a 3 day supply:  [] Yes  [x] No    Would you like a call back once the refill request has been completed: [] Yes [x] No    If the office needs to give you a call back, can they leave a voicemail: [] Yes [x] No    Lianna Aleman   07/18/24 08:33 EDT    [Total Preg ___] : : [unfilled] [Abortions ___] : [unfilled] (abortions) [Vaginal ___] : [unfilled] vaginal delivery(s)

## 2024-08-05 ENCOUNTER — APPOINTMENT (OUTPATIENT)
Dept: RHEUMATOLOGY | Facility: CLINIC | Age: 54
End: 2024-08-05

## 2024-08-05 PROCEDURE — 99214 OFFICE O/P EST MOD 30 MIN: CPT

## 2024-08-05 PROCEDURE — G2211 COMPLEX E/M VISIT ADD ON: CPT

## 2024-08-05 NOTE — ASSESSMENT
[FreeTextEntry1] : 1.  SLE:  consisting of arthritis, nasal ulcers, Raynauds, alopecia, rash, and nephritis.  Rash was present during late 2014 and 2015, although during the late summer 2015, it wasn't particularly active. However, the rash was quite active in the spring, summer, and into the fall 2016 despite her medical regimen. It was  most prominent on the left arm and back.  Into 2017 the rash quieted down with the use of topical steroids. In 2018 it was intermittently active, but in late 2018/early 2019 the rash was quite active on the face, back, and hands.  She received an injection and topical therapy by her dermatologist.  The rash has remained active on her arms and thighs.  In addition, she has developed red spots (some tender) on the palmar aspects of her fingers. Her rash on the arms started to scale/improve. Arthralgias more prominent in summer, which is what is happening in summer 2019. Fingers, elbows and knees. Buttock rash present in 5/19 faded, but returned on arms, back. She saw VALARIE Flores who prescribed topicals, which have been helpful-as of Jan 2020 the active rash was confined to her buttocks. However, during the first part of 2020 the rash worsened despite the use of topicals.  The rash was quiet in early 2021.  She was not able to reduce prednisone to 7.5 mg/d as her hand arthritis worsened. The rash on her flanks became active, and she used topical steroids (triamcinolone 0.1%) with minimal success.  In Jul 2021 she reported  pain and swelling in her joints, mainly the hands. Rash was active on the RUE in Sep 2021. This improved, but she developed a rash on the right flank. In Feb 2020 there were few active manifestations of SLE. In 2023 she developed rash in the right flank, which partially responded to topical steroids. By May 2023 the rash was inactive. Her rash continued to be inactive through 2024.  2.  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  She has been treated with CellCept and remained on 2500 mg daily. She has had low-grade proteinuria. A 24 hour urine was to be collected in November 2016.  I was always concerned she had smoldering LN activity.  In the spring 2017 she stopped the CellCept on her own and then developed a major flare with an increase in creatinine to 2.5 with a urinary Pr/Cr of 5.  She was put back on CellCept and prednisone 40 mg/d.  The biopsy from late September 2017 demonstrated a class IV-G (A and C) lesion with 44% of the glomeruli sclerosed.  In addition, she had tubular atrophy and interstitial fibrosis estimated at about 50% (Activity: 10/24; Chronicity: 9/12).  It was decided despite the chronicity to try pulse steroids. Her creatinine went up but came back down to the high 2's. It dropped further to the low 2's. She was not seen from Dec 2017 until early Mar 2018.  She was doing relatively well clinically.  Lab tests were in order however. With a slight increase in furosemide, the edema improved. The creatinine has come down, albeit not normal. Given her chronic serologic activity and proteinuria, a repeat biopsy is in order.  She had a lot of chronicity on her last biopsy. A repeat biopsy was discussed to determine whether activity persists and whether a new therapeutic approach should be taken. Based on labs there seems to be a mixed picture, and without a kidney biopsy the activity of her kidney cannot be discerned, although she will no doubt have a lot of damage. The biopsy, which was performed in the spring 2019, failed to show activity; however, there was damage.  Therefore, I can't justify changing therapies.  3.  Lower extremity edema:  she had an ECHO and was told it was OK.  No back pain.  The edema came and went, but it has been diuretic responsive.  Her regimen of furosemide alternating with hydrochlorothiazide was switched to torsemide. She then went off all diuretics in early November 2017. The edema returned in the spring of 2019 perhaps because of an increase in prednisone. Pt could not tolerate furosemide 60 mg, so went down to 40 mg/d. However, on this dose she had edema, and it was suggested that she raise the dose. The edema disappeared when she took a higher dose of her furosemide. It got much better when she stopped the amlodipine.  4.  Chronic CellCept use:  tolerating without GI symptoms. 5.  Chronic Plaquenil use: no ocular symptoms 6.  Vitamin D deficiency 7.  Ocular migraine:  episode in late summer 2015 8.  URI 9.  Knee pain: intermittent right knee pain.  In Jul 2023 she had an effusion in the right knee. Aspiration was offered, but she decided to wait.  10.  Angioedema:  episodes in the fall 2015 involving the lips and eyes prompted an evaluation of allergy.  No allergen was identified.   12.  Dysesthesias in the legs: etiology unclear, but this symptom began in early 2016. 13.  Muscle cramps: she feels is related to diuretic use.  She cut down by using HCTZ, but this has not been an effective diuretic.  14.  Dry eyes: using topicals 15.  Hypertension:  remains elevated.  She was seen by GYN in the late spring 2017, her BP was 150/100 as it was on her June 28, 2017 visit. Additional therapy was warranted. Into 2019 her BP remained high. During her first visit of 2020 it was high again-though she had run out of Bystolic. She has noted feeling pre-syncopal at home when arising from a seated position.  With a low BP recorded on Jun 14, 2021, perhaps she is relatively hypotensive at home.   16.  Shingles:  outbreak on the right hemithorax end of October 2017 responsive to Valtrex.  17.  Hyperkalemia: she had high potassium's, but it was unclear if they were a result of difficulty drawing blood or from nephritis and/or valsartan.  The valsartan was stopped.  She started another what sounds like ARB (probably eprosartan). 18.  GERD: evaluated by CHEL Flores, including an endoscopy.  A variety of drugs failed.  She was referred for a motility evaluation.   19.  Gout:  episode of podagra left foot in August 2018.  She received prednisone with initial relief; however, a recurrence required another course.  No hx of kidney stones, tophi. She then experienced a similar episode in the right great toe necessitating more steroids.  Uloric was prescribed but not taken. In Sept 2019 she presented with 1 month of left 1st MTP pain and swelling. She decided to take Uloric, but in early 2020 stopped. It was restarted, but it may be that she needs a higher dose.  In late Apr 2021 she developed podagra (right great toe) that was unresponsive to increasing prednisone to 15 mg/d. This has lingered far longer than expected and requires 20-40 mg/d of prednisone.  20.  Rash: onset of rash left buttock in Oct 2018; etiology unclear but did not seem like shingles.  This rash returned in the spring 2019.  She used topical steroids. Once again it returned in early 2024. It was partially responsive to triamcinolone, but I questioned whether it could be a return of HSV like in 2021.  21.  Headache: HA relieved with Tylenol during the early part of March 2019.  BPs have not been high enough to explain the HA.  22.  Anemia: the pattern appeared to be iron deficiency.  In 2023 she mentioned heavy periods were responsible. 23.  Jaw pain: onset of bilateral jaw pain in Jan 2020 for which she attained some relief by increasing the prednisone to 20 mg/d for two days.  Whether it was truly TMJ or perhaps parotid is unclear. It reproducibly improved on steroids.  24.  Low back pain: onset in the late spring 2020 of low back pain without trauma or radiation.  25.  Muscle cramps: episode in mid-Oct 2020 of diffuse muscle cramps, the cause of which was not known. Perhaps related to diuretics. The cramps were quite bothersome during 2022. 26. Paresthesias:  affecting fingertips and toes reported in early 2021. Noted at night. No color changes.   27. DVT: a below the knee DVT in the right leg.  With positive aPL Ab, I decided to treat with warfarin.  For how long is another question.  28.  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 Recurrences treated with Valtrex. She experienced several outbreaks per month.  Therefore, a trial of low dose Valtrex prophylaxis was warranted.  Since starting daily Valtrex, she has not had a recurrence.  27.  Heather's pain: pain at insertion end of Oct 2021.  I suppose this could be gout. It prevents her from walking and generally subsides in one week.  28.  Rash: She reported in Aug 2022 solitary pruritic lesions on her lower back that dry up with steroid cream. She was seen by Derm in 2021, and a diagnosis of Herpes simplex was made.  The rash that was present in Dec 2023 was quite similar, and she was therefore urged to return to dermatology.  29.  LGSIL: scheduled for procedure May 26, 2023. Issue raised about anticoagulation.  I think it sufficient to stop warfarin three days before the procedure and resume the night of the procedure.  30.  Knee pain: posterior right knee pain in Sep 2023.  She underwent a Doppler, which was negative for a DVT.  More likely a Baker's cyst.   Plan Lab tests Reviewed internal and/or external records of other providers Contact me Continue Valtrex 500 mg/d prophylaxis  Systemic Lupus Erythematosus, known as lupus, is a chronic autoimmune disease that can affect any organ in the body posing threats to proper organ function and even to life. Therefore, close surveillance of all bodily functions is required, including but not limited to central and peripheral nervous system, ocular and auditory systems, cardiopulmonary function, kidney function, mucocutaneous and musculoskeletal systems as well as constitutional manifestations. Surveillance consists of history, physical, and laboratory tests. Treatment varies, but most of the drugs used are high risk and therefore also require close monitoring in the form of blood and urine tests. High risk medications used in the treatment of rheumatic diseases include steroids, disease modifying agents, immunosuppressive therapies, antimalarials, biologics, and chemotherapy. Regardless of which drug or class of drug, the potential toxicities of these therapies mandate close monitoring in the form of a history, physical, and laboratory tests. Return 2-3 months

## 2024-08-05 NOTE — PHYSICAL EXAM
[General Appearance - Alert] : alert [General Appearance - In No Acute Distress] : in no acute distress [General Appearance - Well Nourished] : well nourished [General Appearance - Well Developed] : well developed [General Appearance - Well-Appearing] : healthy appearing [Sclera] : the sclera and conjunctiva were normal [Extraocular Movements] : extraocular movements were intact [Strabismus] : no strabismus was seen [Outer Ear] : the ears and nose were normal in appearance [Examination Of The Oral Cavity] : the lips and gums were normal [Nasal Cavity] : the nasal mucosa and septum were normal [Oropharynx] : the oropharynx was normal [Neck Appearance] : the appearance of the neck was normal [Neck Cervical Mass (___cm)] : no neck mass was observed [Jugular Venous Distention Increased] : there was no jugular-venous distention [Thyroid Diffuse Enlargement] : the thyroid was not enlarged [Respiration, Rhythm And Depth] : normal respiratory rhythm and effort [Exaggerated Use Of Accessory Muscles For Inspiration] : no accessory muscle use [Auscultation Breath Sounds / Voice Sounds] : lungs were clear to auscultation bilaterally [Heart Rate And Rhythm] : heart rate was normal and rhythm regular [Heart Sounds] : normal S1 and S2 [Heart Sounds Gallop] : no gallops [Murmurs] : no murmurs [Heart Sounds Pericardial Friction Rub] : no pericardial rub [Veins - Varicosity Changes] : there were no varicosital changes [Bowel Sounds] : normal bowel sounds [Abdomen Soft] : soft [Abdomen Tenderness] : non-tender [] : no hepato-splenomegaly [Abdomen Mass (___ Cm)] : no abdominal mass palpated [Cervical Lymph Nodes Enlarged Posterior Bilaterally] : posterior cervical [Cervical Lymph Nodes Enlarged Anterior Bilaterally] : anterior cervical [Supraclavicular Lymph Nodes Enlarged Bilaterally] : supraclavicular [No CVA Tenderness] : no ~M costovertebral angle tenderness [No Spinal Tenderness] : no spinal tenderness [Skin Turgor] : normal skin turgor [Sensation] : the sensory exam was normal to light touch and pinprick [Motor Exam] : the motor exam was normal [Oriented To Time, Place, And Person] : oriented to person, place, and time [Impaired Insight] : insight and judgment were intact [Affect] : the affect was normal [Mood] : the mood was normal [Abnormal Walk] : normal gait [Nail Clubbing] : no clubbing  or cyanosis of the fingernails [Musculoskeletal - Swelling] : no joint swelling seen [Motor Tone] : muscle strength and tone were normal [FreeTextEntry1] : mild tenderness bilateral lateral UE's (soft tissue)

## 2024-08-05 NOTE — HISTORY OF PRESENT ILLNESS
[FreeTextEntry1] : 1.  SLE:  consisting of arthritis, nasal ulcers, Raynauds, alopecia, rash, and nephritis.  Rash was present during late 2014 and 2015, although during the late summer 2015, it wasn't particularly active. However, the rash was quite active in the spring, summer, and into the fall 2016 despite her medical regimen. It was  most prominent on the left arm and back.  Into 2017 the rash quieted down with the use of topical steroids. In 2018 it was intermittently active, but in late 2018/early 2019 the rash was quite active on the face, back, and hands.  She received an injection and topical therapy by her dermatologist.  The rash has remained active on her arms and thighs.  In addition, she has developed red spots (some tender) on the palmar aspects of her fingers. Her rash on the arms started to scale/improve. Arthralgias more prominent in summer, which is what is happening in summer 2019. Fingers, elbows and knees. Buttock rash present in 5/19 faded, but returned on arms, back. She saw VALARIE Flores who prescribed topicals, which have been helpful-as of Jan 2020 the active rash was confined to her buttocks. However, during the first part of 2020 the rash worsened despite the use of topicals.  The rash was quiet in early 2021.  She was not able to reduce prednisone to 7.5 mg/d as her hand arthritis worsened. The rash on her flanks became active, and she used topical steroids (triamcinolone 0.1%) with minimal success.  In Jul 2021 she reported  pain and swelling in her joints, mainly the hands. Rash was active on the RUE in Sep 2021. This improved, but she developed a rash on the right flank. In Feb 2020 there were few active manifestations of SLE. In 2023 she developed rash in the right flank, which partially responded to topical steroids. By May 2023 the rash was inactive. Her rash continued to be inactive through 2024.  2.  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  She has been treated with CellCept and remained on 2500 mg daily. She has had low-grade proteinuria. A 24 hour urine was to be collected in November 2016.  I was always concerned she had smoldering LN activity.  In the spring 2017 she stopped the CellCept on her own and then developed a major flare with an increase in creatinine to 2.5 with a urinary Pr/Cr of 5.  She was put back on CellCept and prednisone 40 mg/d.  The biopsy from late September 2017 demonstrated a class IV-G (A and C) lesion with 44% of the glomeruli sclerosed.  In addition, she had tubular atrophy and interstitial fibrosis estimated at about 50% (Activity: 10/24; Chronicity: 9/12).  It was decided despite the chronicity to try pulse steroids. Her creatinine went up but came back down to the high 2's. It dropped further to the low 2's. She was not seen from Dec 2017 until early Mar 2018.  She was doing relatively well clinically.  Lab tests were in order however. With a slight increase in furosemide, the edema improved. The creatinine has come down, albeit not normal. Given her chronic serologic activity and proteinuria, a repeat biopsy is in order.  She had a lot of chronicity on her last biopsy. A repeat biopsy was discussed to determine whether activity persists and whether a new therapeutic approach should be taken. Based on labs there seems to be a mixed picture, and without a kidney biopsy the activity of her kidney cannot be discerned, although she will no doubt have a lot of damage. The biopsy, which was performed in the spring 2019, failed to show activity; however, there was damage.  Therefore, I can't justify changing therapies.  3.  Lower extremity edema:  she had an ECHO and was told it was OK.  No back pain.  The edema came and went, but it has been diuretic responsive.  Her regimen of furosemide alternating with hydrochlorothiazide was switched to torsemide. She then went off all diuretics in early November 2017. The edema returned in the spring of 2019 perhaps because of an increase in prednisone. Pt could not tolerate furosemide 60 mg, so went down to 40 mg/d. However, on this dose she had edema, and it was suggested that she raise the dose. The edema disappeared when she took a higher dose of her furosemide. It got much better when she stopped the amlodipine.  4.  Chronic CellCept use:  tolerating without GI symptoms. 5.  Chronic Plaquenil use: no ocular symptoms 6.  Vitamin D deficiency 7.  Ocular migraine:  episode in late summer 2015 8.  URI 9.  Knee pain: intermittent right knee pain.  In Jul 2023 she had an effusion in the right knee. Aspiration was offered, but she decided to wait.  10.  Angioedema:  episodes in the fall 2015 involving the lips and eyes prompted an evaluation of allergy.  No allergen was identified.   12.  Dysesthesias in the legs: etiology unclear, but this symptom began in early 2016. 13.  Muscle cramps: she feels is related to diuretic use.  She cut down by using HCTZ, but this has not been an effective diuretic.  14.  Dry eyes: using topicals 15.  Hypertension:  remains elevated.  She was seen by GYN in the late spring 2017, her BP was 150/100 as it was on her June 28, 2017 visit. Additional therapy was warranted. Into 2019 her BP remained high. During her first visit of 2020 it was high again-though she had run out of Bystolic. She has noted feeling pre-syncopal at home when arising from a seated position.  With a low BP recorded on Jun 14, 2021, perhaps she is relatively hypotensive at home.   16.  Shingles:  outbreak on the right hemithorax end of October 2017 responsive to Valtrex.  17.  Hyperkalemia: she had high potassium's, but it was unclear if they were a result of difficulty drawing blood or from nephritis and/or valsartan.  The valsartan was stopped.  She started another what sounds like ARB (probably eprosartan). 18.  GERD: evaluated by CHEL Flores, including an endoscopy.  A variety of drugs failed.  She was referred for a motility evaluation.   19.  Gout:  episode of podagra left foot in August 2018.  She received prednisone with initial relief; however, a recurrence required another course.  No hx of kidney stones, tophi. She then experienced a similar episode in the right great toe necessitating more steroids.  Uloric was prescribed but not taken. In Sept 2019 she presented with 1 month of left 1st MTP pain and swelling. She decided to take Uloric, but in early 2020 stopped. It was restarted, but it may be that she needs a higher dose.  In late Apr 2021 she developed podagra (right great toe) that was unresponsive to increasing prednisone to 15 mg/d. This has lingered far longer than expected and requires 20-40 mg/d of prednisone.  20.  Rash: onset of rash left buttock in Oct 2018; etiology unclear but did not seem like shingles.  This rash returned in the spring 2019.  She used topical steroids. Once again it returned in early 2024. It was partially responsive to triamcinolone, but I questioned whether it could be a return of HSV like in 2021.  21.  Headache: HA relieved with Tylenol during the early part of March 2019.  BPs have not been high enough to explain the HA.  22.  Anemia: the pattern appeared to be iron deficiency.  In 2023 she mentioned heavy periods were responsible. 23.  Jaw pain: onset of bilateral jaw pain in Jan 2020 for which she attained some relief by increasing the prednisone to 20 mg/d for two days.  Whether it was truly TMJ or perhaps parotid is unclear. It reproducibly improved on steroids.  24.  Low back pain: onset in the late spring 2020 of low back pain without trauma or radiation.  25.  Muscle cramps: episode in mid-Oct 2020 of diffuse muscle cramps, the cause of which was not known. Perhaps related to diuretics. The cramps were quite bothersome during 2022. 26. Paresthesias:  affecting fingertips and toes reported in early 2021. Noted at night. No color changes.   27. DVT: a below the knee DVT in the right leg.  With positive aPL Ab, I decided to treat with warfarin.  For how long is another question.  28.  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 Recurrences treated with Valtrex. She experienced several outbreaks per month.  Therefore, a trial of low dose Valtrex prophylaxis was warranted.  Since starting daily Valtrex, she has not had a recurrence.  27.  Heather's pain: pain at insertion end of Oct 2021.  I suppose this could be gout. It prevents her from walking and generally subsides in one week.  28.  Rash: She reported in Aug 2022 solitary pruritic lesions on her lower back that dry up with steroid cream. She was seen by Derm in 2021, and a diagnosis of Herpes simplex was made.  The rash that was present in Dec 2023 was quite similar, and she was therefore urged to return to dermatology.  29.  LGSIL: scheduled for procedure May 26, 2023. Issue raised about anticoagulation.  I think it sufficient to stop warfarin three days before the procedure and resume the night of the procedure.  30.  Knee pain: posterior right knee pain in Sep 2023.  She underwent a Doppler, which was negative for a DVT.  More likely a Baker's cyst.   Meds warfarin 10 mg/d Uloric 40 mg/d   furosemide 20 mg/d prn  prednisone 5 mg/d   Wegovy CellCept 2000 mg/d in divided doses irbesartan 300 mg  Bystolic 10 mg/d Plaquenil 200 mg/d weekdays and 400 mg/d weekends Vit D 5000 IU/d Valtrex 500 mg/d Ferrex 150 mg/d Feraheme spring 2023 triamcinolone ointment  Vaccines PNVX 3/24/2008 PNVX 1/7/2013 ([id number]; 30Jan2015) PNVX 23  2/14/19  ([id number]; 8/2/20) Prevnar 13 valent 2/3/2015 ([id number]; exp 3/16) Prevnar 20 6/3/24 ([id number]; exp 3/25) Quadrivalent fluzone  9/29/2015 ( AA; exp 6/2016) Flu Quadrivalent 09/27/2021 ([id number]; exp 6/30/2022) Shingrix 8/15/22  ([id number], exp 3/25/23; [id number]; exp 3/25/23)

## 2024-09-19 ENCOUNTER — RX RENEWAL (OUTPATIENT)
Age: 54
End: 2024-09-19

## 2024-11-11 ENCOUNTER — NON-APPOINTMENT (OUTPATIENT)
Age: 54
End: 2024-11-11

## 2024-11-11 ENCOUNTER — APPOINTMENT (OUTPATIENT)
Dept: RHEUMATOLOGY | Facility: CLINIC | Age: 54
End: 2024-11-11
Payer: COMMERCIAL

## 2024-11-11 VITALS
BODY MASS INDEX: 38.34 KG/M2 | HEART RATE: 84 BPM | DIASTOLIC BLOOD PRESSURE: 88 MMHG | OXYGEN SATURATION: 99 % | HEIGHT: 68 IN | WEIGHT: 253 LBS | SYSTOLIC BLOOD PRESSURE: 123 MMHG

## 2024-11-11 DIAGNOSIS — M32.14 GLOMERULAR DISEASE IN SYSTEMIC LUPUS ERYTHEMATOSUS: ICD-10-CM

## 2024-11-11 DIAGNOSIS — M1A.9XX0 CHRONIC GOUT, UNSPECIFIED, W/OUT TOPHUS (TOPHI): ICD-10-CM

## 2024-11-11 DIAGNOSIS — M32.9 SYSTEMIC LUPUS ERYTHEMATOSUS, UNSPECIFIED: ICD-10-CM

## 2024-11-11 DIAGNOSIS — Z79.01 LONG TERM (CURRENT) USE OF ANTICOAGULANTS: ICD-10-CM

## 2024-11-11 DIAGNOSIS — M1A.40X0: ICD-10-CM

## 2024-11-11 DIAGNOSIS — Z79.899 OTHER LONG TERM (CURRENT) DRUG THERAPY: ICD-10-CM

## 2024-11-11 DIAGNOSIS — E79.0 HYPERURICEMIA W/OUT SIGNS OF INFLAMMATORY ARTHRITIS AND TOPHACEOUS DISEASE: ICD-10-CM

## 2024-11-11 LAB
25(OH)D3 SERPL-MCNC: 26.9 NG/ML
ALBUMIN SERPL ELPH-MCNC: 4 G/DL
ALP BLD-CCNC: 123 U/L
ALT SERPL-CCNC: 17 U/L
ANION GAP SERPL CALC-SCNC: 13 MMOL/L
APPEARANCE: CLEAR
AST SERPL-CCNC: 15 U/L
BACTERIA: NEGATIVE /HPF
BASOPHILS # BLD AUTO: 0.04 K/UL
BASOPHILS NFR BLD AUTO: 0.4 %
BILIRUB SERPL-MCNC: 0.5 MG/DL
BILIRUBIN URINE: NEGATIVE
BLOOD URINE: NEGATIVE
BUN SERPL-MCNC: 26 MG/DL
C3 SERPL-MCNC: 104 MG/DL
C4 SERPL-MCNC: 13 MG/DL
CALCIUM SERPL-MCNC: 9.6 MG/DL
CAST: 2 /LPF
CHLORIDE SERPL-SCNC: 108 MMOL/L
CHOLEST SERPL-MCNC: 210 MG/DL
CK SERPL-CCNC: 72 U/L
CO2 SERPL-SCNC: 20 MMOL/L
COLOR: YELLOW
CREAT SERPL-MCNC: 1.68 MG/DL
CREAT SPEC-SCNC: 160 MG/DL
CREAT/PROT UR: 0.1 RATIO
EGFR: 36 ML/MIN/1.73M2
EOSINOPHIL # BLD AUTO: 0.04 K/UL
EOSINOPHIL NFR BLD AUTO: 0.4 %
EPITHELIAL CELLS: 5 /HPF
ESTIMATED AVERAGE GLUCOSE: 94 MG/DL
GLUCOSE QUALITATIVE U: NEGATIVE MG/DL
HBA1C MFR BLD HPLC: 4.9 %
HCT VFR BLD CALC: 43 %
HDLC SERPL-MCNC: 53 MG/DL
HGB BLD-MCNC: 14.2 G/DL
IMM GRANULOCYTES NFR BLD AUTO: 1.3 %
INR PPP: 2.55 RATIO
KETONES URINE: NEGATIVE MG/DL
LDLC SERPL CALC-MCNC: 130 MG/DL
LEUKOCYTE ESTERASE URINE: NEGATIVE
LYMPHOCYTES # BLD AUTO: 2 K/UL
LYMPHOCYTES NFR BLD AUTO: 20.4 %
MAN DIFF?: NORMAL
MCHC RBC-ENTMCNC: 28.9 PG
MCHC RBC-ENTMCNC: 33 G/DL
MCV RBC AUTO: 87.4 FL
MICROSCOPIC-UA: NORMAL
MONOCYTES # BLD AUTO: 0.87 K/UL
MONOCYTES NFR BLD AUTO: 8.9 %
NEUTROPHILS # BLD AUTO: 6.74 K/UL
NEUTROPHILS NFR BLD AUTO: 68.6 %
NITRITE URINE: NEGATIVE
NONHDLC SERPL-MCNC: 157 MG/DL
PH URINE: 5.5
PLATELET # BLD AUTO: 139 K/UL
POTASSIUM SERPL-SCNC: 3.8 MMOL/L
PROT SERPL-MCNC: 7.2 G/DL
PROT UR-MCNC: 14 MG/DL
PROTEIN URINE: NORMAL MG/DL
PT BLD: 29.8 SEC
RBC # BLD: 4.92 M/UL
RBC # FLD: 16.8 %
RED BLOOD CELLS URINE: 1 /HPF
SODIUM SERPL-SCNC: 140 MMOL/L
SPECIFIC GRAVITY URINE: 1.02
TRIGL SERPL-MCNC: 152 MG/DL
URATE SERPL-MCNC: 4.7 MG/DL
UROBILINOGEN URINE: 0.2 MG/DL
WBC # FLD AUTO: 9.82 K/UL
WHITE BLOOD CELLS URINE: 0 /HPF

## 2024-11-11 PROCEDURE — G2211 COMPLEX E/M VISIT ADD ON: CPT

## 2024-11-11 PROCEDURE — 99214 OFFICE O/P EST MOD 30 MIN: CPT

## 2024-11-12 LAB
DSDNA AB SER-ACNC: 5 IU/ML
ENA RNP AB SER IA-ACNC: 0.2 AL
ENA SM AB SER IA-ACNC: 0.9 AL
ENA SS-A AB SER IA-ACNC: 2.9 AL
ENA SS-B AB SER IA-ACNC: <0.2 AL

## 2024-11-18 LAB — HYDROXYCHLOROQUINE CONCENTRATION: 1319.8 NG/ML

## 2024-11-19 ENCOUNTER — RX RENEWAL (OUTPATIENT)
Age: 54
End: 2024-11-19

## 2024-11-27 NOTE — ASU PATIENT PROFILE, ADULT - NS PRO AD PATIENT TYPE ON CHART
Refill Request     CONFIRM preferred pharmacy with the patient.    If Mail Order Rx - Pend for 90 day refill.      Last Seen: Last Seen Department: 10/25/2024  Last Seen by PCP: 10/25/2024    Last Written: 12/31/23 18 with 10 refills     If no future appointment scheduled:  Review the last OV with PCP and review information for follow-up visit,  Route STAFF MESSAGE with patient name to the  Pool for scheduling with the following information:            -  Timing of next visit           -  Visit type ie Physical, OV, etc           -  Diagnoses/Reason ie. COPD, HTN - Do not use MEDICATION, Follow-up or CHECK UP - Give reason for visit      Next Appointment:   Future Appointments   Date Time Provider Department Center   1/10/2025  1:15 PM Eduar Garcia MD [facility]   3/7/2025  8:30 AM Cheryle Eubanks APRN - [facility]   4/18/2025 12:00 PM Carlos Eduardo Maya, DO [facility]       Message sent to  to schedule appt with patient?  NO      Requested Prescriptions     Pending Prescriptions Disp Refills    albuterol sulfate HFA (PROVENTIL;VENTOLIN;PROAIR) 108 (90 Base) MCG/ACT inhaler [Pharmacy Med Name: ALBUTEROL HFA (PROAIR) INHALER] 8.5 each 10     Sig: INHALE TWO (2) PUFFS BY MOUTH EVERY 6 HOURS AS NEEDED       
Health Care Proxy (HCP)

## 2025-01-15 ENCOUNTER — RX RENEWAL (OUTPATIENT)
Age: 55
End: 2025-01-15

## 2025-01-23 ENCOUNTER — RX RENEWAL (OUTPATIENT)
Age: 55
End: 2025-01-23

## 2025-02-03 ENCOUNTER — APPOINTMENT (OUTPATIENT)
Dept: RHEUMATOLOGY | Facility: CLINIC | Age: 55
End: 2025-02-03
Payer: COMMERCIAL

## 2025-02-03 VITALS
DIASTOLIC BLOOD PRESSURE: 86 MMHG | HEIGHT: 68 IN | SYSTOLIC BLOOD PRESSURE: 122 MMHG | OXYGEN SATURATION: 97 % | WEIGHT: 243 LBS | HEART RATE: 90 BPM | BODY MASS INDEX: 36.83 KG/M2

## 2025-02-03 DIAGNOSIS — Z79.899 OTHER LONG TERM (CURRENT) DRUG THERAPY: ICD-10-CM

## 2025-02-03 DIAGNOSIS — I10 ESSENTIAL (PRIMARY) HYPERTENSION: ICD-10-CM

## 2025-02-03 DIAGNOSIS — Z79.60 LONG TERM (CURRENT) USE OF UNSPECIFIED IMMUNOMODULATORS AND IMMUNOSUPPRESSANTS: ICD-10-CM

## 2025-02-03 DIAGNOSIS — Z79.01 LONG TERM (CURRENT) USE OF ANTICOAGULANTS: ICD-10-CM

## 2025-02-03 DIAGNOSIS — M32.9 SYSTEMIC LUPUS ERYTHEMATOSUS, UNSPECIFIED: ICD-10-CM

## 2025-02-03 DIAGNOSIS — E79.0 HYPERURICEMIA W/OUT SIGNS OF INFLAMMATORY ARTHRITIS AND TOPHACEOUS DISEASE: ICD-10-CM

## 2025-02-03 DIAGNOSIS — M32.14 GLOMERULAR DISEASE IN SYSTEMIC LUPUS ERYTHEMATOSUS: ICD-10-CM

## 2025-02-03 LAB
INR PPP: 4.22 RATIO
PT BLD: 49.1 SEC

## 2025-02-03 PROCEDURE — 99214 OFFICE O/P EST MOD 30 MIN: CPT

## 2025-02-03 PROCEDURE — G2211 COMPLEX E/M VISIT ADD ON: CPT

## 2025-02-04 LAB
ALBUMIN SERPL ELPH-MCNC: 4 G/DL
ALP BLD-CCNC: 118 U/L
ALT SERPL-CCNC: 17 U/L
ANION GAP SERPL CALC-SCNC: 12 MMOL/L
APPEARANCE: CLEAR
AST SERPL-CCNC: 19 U/L
BACTERIA: ABNORMAL /HPF
BASOPHILS # BLD AUTO: 0.06 K/UL
BASOPHILS NFR BLD AUTO: 0.6 %
BILIRUB SERPL-MCNC: 0.6 MG/DL
BILIRUBIN URINE: NEGATIVE
BLOOD URINE: NEGATIVE
BUN SERPL-MCNC: 17 MG/DL
C3 SERPL-MCNC: 107 MG/DL
C4 SERPL-MCNC: 16 MG/DL
CALCIUM SERPL-MCNC: 9.5 MG/DL
CAST: 4 /LPF
CHLORIDE SERPL-SCNC: 110 MMOL/L
CHOLEST SERPL-MCNC: 196 MG/DL
CK SERPL-CCNC: 63 U/L
CO2 SERPL-SCNC: 22 MMOL/L
COLOR: YELLOW
CREAT SERPL-MCNC: 1.64 MG/DL
CREAT SPEC-SCNC: 257 MG/DL
CREAT/PROT UR: 0.2 RATIO
DSDNA AB SER-ACNC: 5 IU/ML
EGFR: 37 ML/MIN/1.73M2
EOSINOPHIL # BLD AUTO: 0.07 K/UL
EOSINOPHIL NFR BLD AUTO: 0.7 %
EPITHELIAL CELLS: 26 /HPF
GLUCOSE QUALITATIVE U: NEGATIVE MG/DL
HCT VFR BLD CALC: 42.6 %
HDLC SERPL-MCNC: 49 MG/DL
HGB BLD-MCNC: 14.5 G/DL
HYALINE CASTS: PRESENT
IMM GRANULOCYTES NFR BLD AUTO: 0.2 %
KETONES URINE: NEGATIVE MG/DL
LDLC SERPL CALC-MCNC: 117 MG/DL
LEUKOCYTE ESTERASE URINE: NEGATIVE
LYMPHOCYTES # BLD AUTO: 1.72 K/UL
LYMPHOCYTES NFR BLD AUTO: 16.2 %
MAN DIFF?: NORMAL
MCHC RBC-ENTMCNC: 28.9 PG
MCHC RBC-ENTMCNC: 34 G/DL
MCV RBC AUTO: 84.9 FL
MICROSCOPIC-UA: NORMAL
MONOCYTES # BLD AUTO: 0.79 K/UL
MONOCYTES NFR BLD AUTO: 7.4 %
NEUTROPHILS # BLD AUTO: 7.97 K/UL
NEUTROPHILS NFR BLD AUTO: 74.9 %
NITRITE URINE: NEGATIVE
NONHDLC SERPL-MCNC: 147 MG/DL
PH URINE: 5.5
PLATELET # BLD AUTO: 144 K/UL
POTASSIUM SERPL-SCNC: 3.6 MMOL/L
PROT SERPL-MCNC: 7.4 G/DL
PROT UR-MCNC: 58 MG/DL
PROTEIN URINE: 30 MG/DL
RBC # BLD: 5.02 M/UL
RBC # FLD: 17.4 %
RED BLOOD CELLS URINE: 2 /HPF
REVIEW: NORMAL
SODIUM SERPL-SCNC: 143 MMOL/L
SPECIFIC GRAVITY URINE: 1.02
TRIGL SERPL-MCNC: 169 MG/DL
UROBILINOGEN URINE: 0.2 MG/DL
WBC # FLD AUTO: 10.63 K/UL
WHITE BLOOD CELLS URINE: 2 /HPF

## 2025-02-06 LAB — HYDROXYCHLOROQUINE CONCENTRATION: 1613.6 NG/ML

## 2025-02-15 ENCOUNTER — NON-APPOINTMENT (OUTPATIENT)
Age: 55
End: 2025-02-15

## 2025-03-10 DIAGNOSIS — N60.19 DIFFUSE CYSTIC MASTOPATHY OF UNSPECIFIED BREAST: ICD-10-CM

## 2025-03-10 DIAGNOSIS — Z12.39 ENCOUNTER FOR OTHER SCREENING FOR MALIGNANT NEOPLASM OF BREAST: ICD-10-CM

## 2025-03-17 RX ORDER — PREDNISONE 10 MG/1
10 TABLET ORAL
Qty: 30 | Refills: 3 | Status: ACTIVE | COMMUNITY
Start: 2025-03-17 | End: 1900-01-01

## 2025-05-03 ENCOUNTER — NON-APPOINTMENT (OUTPATIENT)
Age: 55
End: 2025-05-03

## 2025-05-05 ENCOUNTER — APPOINTMENT (OUTPATIENT)
Dept: RHEUMATOLOGY | Facility: CLINIC | Age: 55
End: 2025-05-05
Payer: COMMERCIAL

## 2025-05-05 VITALS
WEIGHT: 230 LBS | OXYGEN SATURATION: 98 % | SYSTOLIC BLOOD PRESSURE: 113 MMHG | HEART RATE: 98 BPM | DIASTOLIC BLOOD PRESSURE: 81 MMHG | HEIGHT: 68 IN | BODY MASS INDEX: 34.86 KG/M2

## 2025-05-05 DIAGNOSIS — E79.0 HYPERURICEMIA W/OUT SIGNS OF INFLAMMATORY ARTHRITIS AND TOPHACEOUS DISEASE: ICD-10-CM

## 2025-05-05 DIAGNOSIS — M32.9 SYSTEMIC LUPUS ERYTHEMATOSUS, UNSPECIFIED: ICD-10-CM

## 2025-05-05 DIAGNOSIS — Z79.01 LONG TERM (CURRENT) USE OF ANTICOAGULANTS: ICD-10-CM

## 2025-05-05 DIAGNOSIS — M32.14 GLOMERULAR DISEASE IN SYSTEMIC LUPUS ERYTHEMATOSUS: ICD-10-CM

## 2025-05-05 DIAGNOSIS — I10 ESSENTIAL (PRIMARY) HYPERTENSION: ICD-10-CM

## 2025-05-05 DIAGNOSIS — Z79.899 OTHER LONG TERM (CURRENT) DRUG THERAPY: ICD-10-CM

## 2025-05-05 LAB
25(OH)D3 SERPL-MCNC: 25.3 NG/ML
ALBUMIN SERPL ELPH-MCNC: 4 G/DL
ALP BLD-CCNC: 108 U/L
ALT SERPL-CCNC: 29 U/L
ANION GAP SERPL CALC-SCNC: 13 MMOL/L
APPEARANCE: CLEAR
AST SERPL-CCNC: 21 U/L
BACTERIA: NEGATIVE /HPF
BASOPHILS # BLD AUTO: 0.05 K/UL
BASOPHILS NFR BLD AUTO: 0.5 %
BILIRUB SERPL-MCNC: 0.6 MG/DL
BILIRUBIN URINE: NEGATIVE
BLOOD URINE: NEGATIVE
BUN SERPL-MCNC: 27 MG/DL
CALCIUM SERPL-MCNC: 9.6 MG/DL
CAST: 5 /LPF
CHLORIDE SERPL-SCNC: 113 MMOL/L
CK SERPL-CCNC: 60 U/L
CO2 SERPL-SCNC: 17 MMOL/L
COLOR: YELLOW
CREAT SERPL-MCNC: 1.84 MG/DL
CREAT SPEC-SCNC: 180 MG/DL
CREAT/PROT UR: 0.1 RATIO
EGFRCR SERPLBLD CKD-EPI 2021: 33 ML/MIN/1.73M2
EOSINOPHIL # BLD AUTO: 0.05 K/UL
EOSINOPHIL NFR BLD AUTO: 0.5 %
EPITHELIAL CELLS: 9 /HPF
GLUCOSE QUALITATIVE U: NEGATIVE MG/DL
HCT VFR BLD CALC: 41.7 %
HGB BLD-MCNC: 13.9 G/DL
HYALINE CASTS: PRESENT
IMM GRANULOCYTES NFR BLD AUTO: 0.3 %
INR PPP: 3.05 RATIO
KETONES URINE: NEGATIVE MG/DL
LEUKOCYTE ESTERASE URINE: NEGATIVE
LYMPHOCYTES # BLD AUTO: 1.58 K/UL
LYMPHOCYTES NFR BLD AUTO: 15.3 %
MAN DIFF?: NORMAL
MCHC RBC-ENTMCNC: 29.3 PG
MCHC RBC-ENTMCNC: 33.3 G/DL
MCV RBC AUTO: 87.8 FL
MICROSCOPIC-UA: NORMAL
MONOCYTES # BLD AUTO: 0.73 K/UL
MONOCYTES NFR BLD AUTO: 7.1 %
NEUTROPHILS # BLD AUTO: 7.91 K/UL
NEUTROPHILS NFR BLD AUTO: 76.3 %
NITRITE URINE: NEGATIVE
PH URINE: 5.5
PLATELET # BLD AUTO: 126 K/UL
POTASSIUM SERPL-SCNC: 4.2 MMOL/L
PROT SERPL-MCNC: 7 G/DL
PROT UR-MCNC: 13 MG/DL
PROTEIN URINE: NORMAL MG/DL
PT BLD: 35.9 SEC
RBC # BLD: 4.75 M/UL
RBC # FLD: 17.8 %
RED BLOOD CELLS URINE: 1 /HPF
REVIEW: NORMAL
SODIUM SERPL-SCNC: 143 MMOL/L
SPECIFIC GRAVITY URINE: 1.02
URATE SERPL-MCNC: 4.8 MG/DL
UROBILINOGEN URINE: 0.2 MG/DL
WBC # FLD AUTO: 10.35 K/UL
WHITE BLOOD CELLS URINE: 1 /HPF

## 2025-05-05 PROCEDURE — 99214 OFFICE O/P EST MOD 30 MIN: CPT

## 2025-05-05 PROCEDURE — G2211 COMPLEX E/M VISIT ADD ON: CPT

## 2025-05-06 LAB
C3 SERPL-MCNC: 91 MG/DL
C4 SERPL-MCNC: 16 MG/DL

## 2025-05-07 LAB
DSDNA AB SER-ACNC: 4 IU/ML
ENA RNP AB SER IA-ACNC: 0.2 AL
ENA SM AB SER IA-ACNC: 1.2 AL
ENA SS-A AB SER IA-ACNC: 2.9 AL
ENA SS-B AB SER IA-ACNC: <0.2 AL
HYDROXYCHLOROQUINE CONCENTRATION: 1370.7 NG/ML

## 2025-05-10 ENCOUNTER — APPOINTMENT (OUTPATIENT)
Dept: ULTRASOUND IMAGING | Facility: IMAGING CENTER | Age: 55
End: 2025-05-10
Payer: COMMERCIAL

## 2025-05-10 ENCOUNTER — RESULT REVIEW (OUTPATIENT)
Age: 55
End: 2025-05-10

## 2025-05-10 ENCOUNTER — APPOINTMENT (OUTPATIENT)
Dept: MAMMOGRAPHY | Facility: IMAGING CENTER | Age: 55
End: 2025-05-10
Payer: COMMERCIAL

## 2025-05-10 ENCOUNTER — OUTPATIENT (OUTPATIENT)
Dept: OUTPATIENT SERVICES | Facility: HOSPITAL | Age: 55
LOS: 1 days | End: 2025-05-10
Payer: COMMERCIAL

## 2025-05-10 DIAGNOSIS — Z98.890 OTHER SPECIFIED POSTPROCEDURAL STATES: Chronic | ICD-10-CM

## 2025-05-10 DIAGNOSIS — Z12.39 ENCOUNTER FOR OTHER SCREENING FOR MALIGNANT NEOPLASM OF BREAST: ICD-10-CM

## 2025-05-10 DIAGNOSIS — D36.9 BENIGN NEOPLASM, UNSPECIFIED SITE: Chronic | ICD-10-CM

## 2025-05-10 DIAGNOSIS — N60.19 DIFFUSE CYSTIC MASTOPATHY OF UNSPECIFIED BREAST: ICD-10-CM

## 2025-05-10 PROCEDURE — 76641 ULTRASOUND BREAST COMPLETE: CPT

## 2025-05-10 PROCEDURE — 76641 ULTRASOUND BREAST COMPLETE: CPT | Mod: 26,50

## 2025-05-10 PROCEDURE — 77067 SCR MAMMO BI INCL CAD: CPT | Mod: 26

## 2025-05-10 PROCEDURE — 77063 BREAST TOMOSYNTHESIS BI: CPT

## 2025-05-10 PROCEDURE — 77063 BREAST TOMOSYNTHESIS BI: CPT | Mod: 26

## 2025-05-10 PROCEDURE — 77067 SCR MAMMO BI INCL CAD: CPT

## 2025-05-12 DIAGNOSIS — Z91.89 OTHER SPECIFIED PERSONAL RISK FACTORS, NOT ELSEWHERE CLASSIFIED: ICD-10-CM

## 2025-06-03 ENCOUNTER — APPOINTMENT (OUTPATIENT)
Dept: OBGYN | Facility: CLINIC | Age: 55
End: 2025-06-03
Payer: COMMERCIAL

## 2025-06-03 VITALS
BODY MASS INDEX: 35.54 KG/M2 | HEIGHT: 68 IN | HEART RATE: 96 BPM | DIASTOLIC BLOOD PRESSURE: 94 MMHG | WEIGHT: 234.5 LBS | SYSTOLIC BLOOD PRESSURE: 121 MMHG

## 2025-06-03 DIAGNOSIS — Z01.411 ENCOUNTER FOR GYNECOLOGICAL EXAMINATION (GENERAL) (ROUTINE) WITH ABNORMAL FINDINGS: ICD-10-CM

## 2025-06-03 DIAGNOSIS — Z91.89 OTHER SPECIFIED PERSONAL RISK FACTORS, NOT ELSEWHERE CLASSIFIED: ICD-10-CM

## 2025-06-03 PROCEDURE — 99396 PREV VISIT EST AGE 40-64: CPT

## 2025-06-03 PROCEDURE — 99459 PELVIC EXAMINATION: CPT

## 2025-06-03 PROCEDURE — 99213 OFFICE O/P EST LOW 20 MIN: CPT | Mod: 25

## 2025-06-04 PROBLEM — Z01.411 ENCOUNTER FOR WELL WOMAN EXAM WITH ABNORMAL FINDINGS: Status: ACTIVE | Noted: 2025-06-04

## 2025-06-26 ENCOUNTER — NON-APPOINTMENT (OUTPATIENT)
Age: 55
End: 2025-06-26

## 2025-07-15 ENCOUNTER — NON-APPOINTMENT (OUTPATIENT)
Age: 55
End: 2025-07-15

## 2025-09-16 ENCOUNTER — APPOINTMENT (OUTPATIENT)
Dept: RHEUMATOLOGY | Facility: CLINIC | Age: 55
End: 2025-09-16
Payer: COMMERCIAL

## 2025-09-16 VITALS
SYSTOLIC BLOOD PRESSURE: 128 MMHG | RESPIRATION RATE: 16 BRPM | HEIGHT: 68 IN | DIASTOLIC BLOOD PRESSURE: 84 MMHG | WEIGHT: 215 LBS | HEART RATE: 89 BPM | OXYGEN SATURATION: 98 % | BODY MASS INDEX: 32.58 KG/M2

## 2025-09-16 DIAGNOSIS — Z79.60 LONG TERM (CURRENT) USE OF UNSPECIFIED IMMUNOMODULATORS AND IMMUNOSUPPRESSANTS: ICD-10-CM

## 2025-09-16 DIAGNOSIS — E79.0 HYPERURICEMIA W/OUT SIGNS OF INFLAMMATORY ARTHRITIS AND TOPHACEOUS DISEASE: ICD-10-CM

## 2025-09-16 DIAGNOSIS — M32.14 GLOMERULAR DISEASE IN SYSTEMIC LUPUS ERYTHEMATOSUS: ICD-10-CM

## 2025-09-16 DIAGNOSIS — I10 ESSENTIAL (PRIMARY) HYPERTENSION: ICD-10-CM

## 2025-09-16 DIAGNOSIS — Z79.01 LONG TERM (CURRENT) USE OF ANTICOAGULANTS: ICD-10-CM

## 2025-09-16 DIAGNOSIS — M32.9 SYSTEMIC LUPUS ERYTHEMATOSUS, UNSPECIFIED: ICD-10-CM

## 2025-09-16 DIAGNOSIS — Z79.899 OTHER LONG TERM (CURRENT) DRUG THERAPY: ICD-10-CM

## 2025-09-16 PROCEDURE — G2211 COMPLEX E/M VISIT ADD ON: CPT

## 2025-09-16 PROCEDURE — 99214 OFFICE O/P EST MOD 30 MIN: CPT

## 2025-09-18 LAB
ALBUMIN SERPL ELPH-MCNC: 3.8 G/DL
ALP BLD-CCNC: 117 U/L
ALT SERPL-CCNC: 20 U/L
ANION GAP SERPL CALC-SCNC: 14 MMOL/L
APPEARANCE: CLEAR
AST SERPL-CCNC: 21 U/L
BACTERIA: NEGATIVE /HPF
BASOPHILS # BLD AUTO: 0.05 K/UL
BASOPHILS NFR BLD AUTO: 0.6 %
BILIRUB SERPL-MCNC: 0.7 MG/DL
BILIRUBIN URINE: NEGATIVE
BLOOD URINE: NEGATIVE
BUN SERPL-MCNC: 18 MG/DL
C3 SERPL-MCNC: 101 MG/DL
C4 SERPL-MCNC: 15 MG/DL
CALCIUM SERPL-MCNC: 9.6 MG/DL
CAST: 2 /LPF
CHLORIDE SERPL-SCNC: 112 MMOL/L
CHOLEST SERPL-MCNC: 183 MG/DL
CK SERPL-CCNC: 50 U/L
CO2 SERPL-SCNC: 18 MMOL/L
COLOR: YELLOW
CREAT SERPL-MCNC: 1.56 MG/DL
CREAT SPEC-SCNC: 162 MG/DL
CREAT/PROT UR: 0.2 RATIO
DSDNA AB SER-ACNC: 6 IU/ML
EGFRCR SERPLBLD CKD-EPI 2021: 40 ML/MIN/1.73M2
EOSINOPHIL # BLD AUTO: 0.08 K/UL
EOSINOPHIL NFR BLD AUTO: 1 %
EPITHELIAL CELLS: 8 /HPF
ESTIMATED AVERAGE GLUCOSE: 88 MG/DL
GLUCOSE QUALITATIVE U: NEGATIVE MG/DL
HBA1C MFR BLD HPLC: 4.7 %
HCT VFR BLD CALC: 38.9 %
HDLC SERPL-MCNC: 42 MG/DL
HGB BLD-MCNC: 13 G/DL
IMM GRANULOCYTES NFR BLD AUTO: 0.2 %
INR PPP: 2.01 RATIO
KETONES URINE: NEGATIVE MG/DL
LDLC SERPL-MCNC: 100 MG/DL
LEUKOCYTE ESTERASE URINE: NEGATIVE
LYMPHOCYTES # BLD AUTO: 2 K/UL
LYMPHOCYTES NFR BLD AUTO: 24.5 %
MAN DIFF?: NORMAL
MCHC RBC-ENTMCNC: 28.9 PG
MCHC RBC-ENTMCNC: 33.4 G/DL
MCV RBC AUTO: 86.4 FL
MICROSCOPIC-UA: NORMAL
MONOCYTES # BLD AUTO: 0.69 K/UL
MONOCYTES NFR BLD AUTO: 8.5 %
NEUTROPHILS # BLD AUTO: 5.32 K/UL
NEUTROPHILS NFR BLD AUTO: 65.2 %
NITRITE URINE: NEGATIVE
NONHDLC SERPL-MCNC: 141 MG/DL
PH URINE: 5.5
PLATELET # BLD AUTO: 112 K/UL
POTASSIUM SERPL-SCNC: 3.8 MMOL/L
PROT SERPL-MCNC: 7.1 G/DL
PROT UR-MCNC: 31 MG/DL
PROTEIN URINE: 30 MG/DL
PT BLD: 23.2 SEC
RBC # BLD: 4.5 M/UL
RBC # FLD: 16.5 %
RED BLOOD CELLS URINE: 1 /HPF
SODIUM SERPL-SCNC: 143 MMOL/L
SPECIFIC GRAVITY URINE: 1.02
TRIGL SERPL-MCNC: 236 MG/DL
UROBILINOGEN URINE: 0.2 MG/DL
WBC # FLD AUTO: 8.16 K/UL
WHITE BLOOD CELLS URINE: 1 /HPF

## 2025-09-23 LAB — OH-CHLOROQUINE BLD-MCNC: 538 NG/ML

## (undated) DEVICE — PACK IV START WITH CHG

## (undated) DEVICE — TUBING SUCTION CONN 6FT STERILE

## (undated) DEVICE — SYR ALLIANCE II INFLATION 60ML

## (undated) DEVICE — TUBING SUCTION 20FT

## (undated) DEVICE — IRRIGATOR BIO SHIELD

## (undated) DEVICE — ELCTR GROUNDING PAD ADULT COVIDIEN

## (undated) DEVICE — BALLOON US ENDO

## (undated) DEVICE — BITE BLOCK ADULT 20 X 27MM (GREEN)

## (undated) DEVICE — FORCEP RADIAL JAW 4 JUMBO 2.8MM 3.2MM 240CM ORANGE DISP

## (undated) DEVICE — BRUSH COLONOSCOPY CYTOLOGY

## (undated) DEVICE — BIOPSY FORCEP RADIAL JAW 4 STANDARD WITH NEEDLE

## (undated) DEVICE — SENSOR O2 FINGER ADULT

## (undated) DEVICE — TUBING IV SET GRAVITY 3Y 100" MACRO

## (undated) DEVICE — FOLEY HOLDER STATLOCK 2 WAY ADULT

## (undated) DEVICE — SUCTION YANKAUER NO CONTROL VENT

## (undated) DEVICE — CATH IV SAFE BC 22G X 1" (BLUE)

## (undated) DEVICE — SYR LUER LOK 50CC

## (undated) DEVICE — CATH IV SAFE BC 20G X 1.16" (PINK)

## (undated) DEVICE — ENDOCUFF VISION SZ 2 LG GRN

## (undated) DEVICE — CLAMP BX HOT RAD JAW 3

## (undated) DEVICE — SOL INJ NS 0.9% 500ML 2 PORT

## (undated) DEVICE — POLY TRAP ETRAP